# Patient Record
Sex: MALE | Race: WHITE | NOT HISPANIC OR LATINO | URBAN - METROPOLITAN AREA
[De-identification: names, ages, dates, MRNs, and addresses within clinical notes are randomized per-mention and may not be internally consistent; named-entity substitution may affect disease eponyms.]

---

## 2017-01-01 ENCOUNTER — COMMUNICATION - HEALTHEAST (OUTPATIENT)
Dept: INTERNAL MEDICINE | Facility: CLINIC | Age: 77
End: 2017-01-01

## 2017-01-01 ENCOUNTER — OFFICE VISIT - HEALTHEAST (OUTPATIENT)
Dept: GERIATRICS | Facility: CLINIC | Age: 77
End: 2017-01-01

## 2017-01-01 ENCOUNTER — COMMUNICATION - HEALTHEAST (OUTPATIENT)
Dept: GERIATRICS | Facility: CLINIC | Age: 77
End: 2017-01-01

## 2017-01-01 ENCOUNTER — COMMUNICATION - HEALTHEAST (OUTPATIENT)
Dept: FAMILY MEDICINE | Facility: CLINIC | Age: 77
End: 2017-01-01

## 2017-01-01 ENCOUNTER — OFFICE VISIT - HEALTHEAST (OUTPATIENT)
Dept: FAMILY MEDICINE | Facility: CLINIC | Age: 77
End: 2017-01-01

## 2017-01-01 ENCOUNTER — RECORDS - HEALTHEAST (OUTPATIENT)
Dept: ADMINISTRATIVE | Facility: OTHER | Age: 77
End: 2017-01-01

## 2017-01-01 ENCOUNTER — AMBULATORY - HEALTHEAST (OUTPATIENT)
Dept: CARDIOLOGY | Facility: CLINIC | Age: 77
End: 2017-01-01

## 2017-01-01 ENCOUNTER — OFFICE VISIT - HEALTHEAST (OUTPATIENT)
Dept: CARDIOLOGY | Facility: CLINIC | Age: 77
End: 2017-01-01

## 2017-01-01 ENCOUNTER — AMBULATORY - HEALTHEAST (OUTPATIENT)
Dept: GERIATRICS | Facility: CLINIC | Age: 77
End: 2017-01-01

## 2017-01-01 DIAGNOSIS — I50.22 CHRONIC SYSTOLIC HEART FAILURE (H): ICD-10-CM

## 2017-01-01 DIAGNOSIS — N18.4 CKD (CHRONIC KIDNEY DISEASE), STAGE IV (H): ICD-10-CM

## 2017-01-01 DIAGNOSIS — I10 ESSENTIAL HYPERTENSION, BENIGN: ICD-10-CM

## 2017-01-01 DIAGNOSIS — I63.9 ACUTE EMBOLIC STROKE (H): ICD-10-CM

## 2017-01-01 DIAGNOSIS — E11.9 DIABETES TYPE 2, CONTROLLED (H): ICD-10-CM

## 2017-01-01 DIAGNOSIS — E03.9 ACQUIRED HYPOTHYROIDISM: ICD-10-CM

## 2017-01-01 DIAGNOSIS — R41.0 DELIRIUM: ICD-10-CM

## 2017-01-01 DIAGNOSIS — E11.3299 MILD NONPROLIFERATIVE DIABETIC RETINOPATHY (H): ICD-10-CM

## 2017-01-01 DIAGNOSIS — R41.3 MEMORY LOSS: ICD-10-CM

## 2017-01-01 DIAGNOSIS — E11.9 TYPE 2 DIABETES MELLITUS (H): ICD-10-CM

## 2017-01-01 DIAGNOSIS — I48.0 PAROXYSMAL ATRIAL FIBRILLATION (H): ICD-10-CM

## 2017-01-01 DIAGNOSIS — N18.5 CKD (CHRONIC KIDNEY DISEASE) STAGE 5, GFR LESS THAN 15 ML/MIN (H): ICD-10-CM

## 2017-01-01 DIAGNOSIS — E11.9 DIABETES MELLITUS (H): ICD-10-CM

## 2017-01-01 DIAGNOSIS — F33.0 MAJOR DEPRESSIVE DISORDER, RECURRENT EPISODE, MILD (H): ICD-10-CM

## 2017-01-01 DIAGNOSIS — Z79.899 LONG TERM USE OF DRUG: ICD-10-CM

## 2017-01-01 DIAGNOSIS — I35.0 NONRHEUMATIC AORTIC VALVE STENOSIS: ICD-10-CM

## 2017-01-01 DIAGNOSIS — E11.9 TYPE II DIABETES MELLITUS (H): ICD-10-CM

## 2017-01-01 DIAGNOSIS — E78.00 PURE HYPERCHOLESTEROLEMIA: ICD-10-CM

## 2017-01-01 DIAGNOSIS — I10 ESSENTIAL HYPERTENSION: ICD-10-CM

## 2017-01-01 DIAGNOSIS — E78.5 DYSLIPIDEMIA: ICD-10-CM

## 2017-01-01 ASSESSMENT — MIFFLIN-ST. JEOR
SCORE: 1454.1
SCORE: 1465.15
SCORE: 1461.18

## 2017-01-04 ENCOUNTER — COMMUNICATION - HEALTHEAST (OUTPATIENT)
Dept: INTERNAL MEDICINE | Facility: CLINIC | Age: 77
End: 2017-01-04

## 2017-01-04 ENCOUNTER — AMBULATORY - HEALTHEAST (OUTPATIENT)
Dept: LAB | Facility: CLINIC | Age: 77
End: 2017-01-04

## 2017-01-04 DIAGNOSIS — I63.9 ACUTE EMBOLIC STROKE (H): ICD-10-CM

## 2017-01-06 ENCOUNTER — AMBULATORY - HEALTHEAST (OUTPATIENT)
Dept: FAMILY MEDICINE | Facility: CLINIC | Age: 77
End: 2017-01-06

## 2017-01-10 ENCOUNTER — RECORDS - HEALTHEAST (OUTPATIENT)
Dept: ADMINISTRATIVE | Facility: OTHER | Age: 77
End: 2017-01-10

## 2017-01-12 ENCOUNTER — AMBULATORY - HEALTHEAST (OUTPATIENT)
Dept: LAB | Facility: CLINIC | Age: 77
End: 2017-01-12

## 2017-01-12 ENCOUNTER — COMMUNICATION - HEALTHEAST (OUTPATIENT)
Dept: INTERNAL MEDICINE | Facility: CLINIC | Age: 77
End: 2017-01-12

## 2017-01-12 DIAGNOSIS — I63.9 ACUTE EMBOLIC STROKE (H): ICD-10-CM

## 2017-01-16 ENCOUNTER — RECORDS - HEALTHEAST (OUTPATIENT)
Dept: ADMINISTRATIVE | Facility: OTHER | Age: 77
End: 2017-01-16

## 2017-01-19 ENCOUNTER — OFFICE VISIT - HEALTHEAST (OUTPATIENT)
Dept: FAMILY MEDICINE | Facility: CLINIC | Age: 77
End: 2017-01-19

## 2017-01-19 ENCOUNTER — COMMUNICATION - HEALTHEAST (OUTPATIENT)
Dept: INTERNAL MEDICINE | Facility: CLINIC | Age: 77
End: 2017-01-19

## 2017-01-19 DIAGNOSIS — E03.9 HYPOTHYROIDISM (ACQUIRED): ICD-10-CM

## 2017-01-19 DIAGNOSIS — R23.3 ECCHYMOSES, SPONTANEOUS: ICD-10-CM

## 2017-01-19 DIAGNOSIS — I10 ESSENTIAL HYPERTENSION, BENIGN: ICD-10-CM

## 2017-01-19 DIAGNOSIS — E11.9 DIABETES TYPE 2, CONTROLLED (H): ICD-10-CM

## 2017-01-19 DIAGNOSIS — I63.9 ACUTE EMBOLIC STROKE (H): ICD-10-CM

## 2017-01-19 DIAGNOSIS — E78.00 PURE HYPERCHOLESTEROLEMIA: ICD-10-CM

## 2017-01-19 LAB
CHOLEST SERPL-MCNC: 180 MG/DL
FASTING STATUS PATIENT QL REPORTED: NO
HBA1C MFR BLD: 6.5 % (ref 3.5–6)
HDLC SERPL-MCNC: 33 MG/DL
LDLC SERPL CALC-MCNC: 89 MG/DL
TRIGL SERPL-MCNC: 292 MG/DL

## 2017-01-19 ASSESSMENT — MIFFLIN-ST. JEOR: SCORE: 1435.67

## 2017-01-24 ENCOUNTER — AMBULATORY - HEALTHEAST (OUTPATIENT)
Dept: CARDIOLOGY | Facility: CLINIC | Age: 77
End: 2017-01-24

## 2017-01-24 ENCOUNTER — RECORDS - HEALTHEAST (OUTPATIENT)
Dept: ADMINISTRATIVE | Facility: OTHER | Age: 77
End: 2017-01-24

## 2017-01-25 ENCOUNTER — COMMUNICATION - HEALTHEAST (OUTPATIENT)
Dept: CARDIOLOGY | Facility: CLINIC | Age: 77
End: 2017-01-25

## 2017-01-25 DIAGNOSIS — I48.91 ATRIAL FIBRILLATION (H): ICD-10-CM

## 2017-01-26 ENCOUNTER — COMMUNICATION - HEALTHEAST (OUTPATIENT)
Dept: INTERNAL MEDICINE | Facility: CLINIC | Age: 77
End: 2017-01-26

## 2017-01-26 ENCOUNTER — AMBULATORY - HEALTHEAST (OUTPATIENT)
Dept: LAB | Facility: CLINIC | Age: 77
End: 2017-01-26

## 2017-01-26 DIAGNOSIS — I63.9 ACUTE EMBOLIC STROKE (H): ICD-10-CM

## 2017-01-27 ENCOUNTER — COMMUNICATION - HEALTHEAST (OUTPATIENT)
Dept: INTERNAL MEDICINE | Facility: CLINIC | Age: 77
End: 2017-01-27

## 2017-01-27 DIAGNOSIS — I63.9 ACUTE EMBOLIC STROKE (H): ICD-10-CM

## 2017-01-30 ENCOUNTER — COMMUNICATION - HEALTHEAST (OUTPATIENT)
Dept: FAMILY MEDICINE | Facility: CLINIC | Age: 77
End: 2017-01-30

## 2017-01-30 DIAGNOSIS — E78.01 ESSENTIAL FAMILIAL HYPERCHOLESTEROLEMIA: ICD-10-CM

## 2017-02-02 ENCOUNTER — AMBULATORY - HEALTHEAST (OUTPATIENT)
Dept: FAMILY MEDICINE | Facility: CLINIC | Age: 77
End: 2017-02-02

## 2017-02-02 ENCOUNTER — COMMUNICATION - HEALTHEAST (OUTPATIENT)
Dept: FAMILY MEDICINE | Facility: CLINIC | Age: 77
End: 2017-02-02

## 2017-02-02 DIAGNOSIS — I50.22 CHRONIC SYSTOLIC HEART FAILURE (H): ICD-10-CM

## 2017-02-02 DIAGNOSIS — I42.9 CARDIOMYOPATHY (H): ICD-10-CM

## 2017-02-06 ENCOUNTER — COMMUNICATION - HEALTHEAST (OUTPATIENT)
Dept: INTERNAL MEDICINE | Facility: CLINIC | Age: 77
End: 2017-02-06

## 2017-02-06 ENCOUNTER — AMBULATORY - HEALTHEAST (OUTPATIENT)
Dept: LAB | Facility: CLINIC | Age: 77
End: 2017-02-06

## 2017-02-06 DIAGNOSIS — I63.9 ACUTE EMBOLIC STROKE (H): ICD-10-CM

## 2017-02-07 ENCOUNTER — AMBULATORY - HEALTHEAST (OUTPATIENT)
Dept: CARDIOLOGY | Facility: CLINIC | Age: 77
End: 2017-02-07

## 2017-02-16 ENCOUNTER — AMBULATORY - HEALTHEAST (OUTPATIENT)
Dept: LAB | Facility: CLINIC | Age: 77
End: 2017-02-16

## 2017-02-16 ENCOUNTER — COMMUNICATION - HEALTHEAST (OUTPATIENT)
Dept: INTERNAL MEDICINE | Facility: CLINIC | Age: 77
End: 2017-02-16

## 2017-02-16 DIAGNOSIS — I63.9 ACUTE EMBOLIC STROKE (H): ICD-10-CM

## 2017-02-21 ENCOUNTER — COMMUNICATION - HEALTHEAST (OUTPATIENT)
Dept: INTERNAL MEDICINE | Facility: CLINIC | Age: 77
End: 2017-02-21

## 2017-02-21 ENCOUNTER — AMBULATORY - HEALTHEAST (OUTPATIENT)
Dept: LAB | Facility: CLINIC | Age: 77
End: 2017-02-21

## 2017-02-21 DIAGNOSIS — I63.9 ACUTE EMBOLIC STROKE (H): ICD-10-CM

## 2017-02-28 ENCOUNTER — AMBULATORY - HEALTHEAST (OUTPATIENT)
Dept: FAMILY MEDICINE | Facility: CLINIC | Age: 77
End: 2017-02-28

## 2017-02-28 DIAGNOSIS — R19.7 DIARRHEA, UNSPECIFIED TYPE: ICD-10-CM

## 2017-03-01 ENCOUNTER — RECORDS - HEALTHEAST (OUTPATIENT)
Dept: ADMINISTRATIVE | Facility: OTHER | Age: 77
End: 2017-03-01

## 2017-03-03 ENCOUNTER — AMBULATORY - HEALTHEAST (OUTPATIENT)
Dept: LAB | Facility: CLINIC | Age: 77
End: 2017-03-03

## 2017-03-03 ENCOUNTER — OFFICE VISIT - HEALTHEAST (OUTPATIENT)
Dept: FAMILY MEDICINE | Facility: CLINIC | Age: 77
End: 2017-03-03

## 2017-03-03 ENCOUNTER — COMMUNICATION - HEALTHEAST (OUTPATIENT)
Dept: LAB | Facility: CLINIC | Age: 77
End: 2017-03-03

## 2017-03-03 DIAGNOSIS — R19.7 DIARRHEA: ICD-10-CM

## 2017-03-03 DIAGNOSIS — I63.9 ACUTE EMBOLIC STROKE (H): ICD-10-CM

## 2017-03-03 DIAGNOSIS — K62.5 BRIGHT RED BLOOD PER RECTUM: ICD-10-CM

## 2017-03-03 DIAGNOSIS — R19.7 DIARRHEA, UNSPECIFIED TYPE: ICD-10-CM

## 2017-03-03 DIAGNOSIS — R79.1 SUPRATHERAPEUTIC INR: ICD-10-CM

## 2017-03-03 ASSESSMENT — MIFFLIN-ST. JEOR: SCORE: 1408.46

## 2017-03-06 ENCOUNTER — AMBULATORY - HEALTHEAST (OUTPATIENT)
Dept: CARE COORDINATION | Facility: CLINIC | Age: 77
End: 2017-03-06

## 2017-03-07 ENCOUNTER — COMMUNICATION - HEALTHEAST (OUTPATIENT)
Dept: INTERNAL MEDICINE | Facility: CLINIC | Age: 77
End: 2017-03-07

## 2017-03-07 ENCOUNTER — AMBULATORY - HEALTHEAST (OUTPATIENT)
Dept: LAB | Facility: CLINIC | Age: 77
End: 2017-03-07

## 2017-03-07 DIAGNOSIS — I63.9 ACUTE EMBOLIC STROKE (H): ICD-10-CM

## 2017-03-16 ENCOUNTER — AMBULATORY - HEALTHEAST (OUTPATIENT)
Dept: CARDIOLOGY | Facility: CLINIC | Age: 77
End: 2017-03-16

## 2017-03-21 ENCOUNTER — AMBULATORY - HEALTHEAST (OUTPATIENT)
Dept: LAB | Facility: CLINIC | Age: 77
End: 2017-03-21

## 2017-03-21 ENCOUNTER — COMMUNICATION - HEALTHEAST (OUTPATIENT)
Dept: INTERNAL MEDICINE | Facility: CLINIC | Age: 77
End: 2017-03-21

## 2017-03-21 DIAGNOSIS — I63.9 ACUTE EMBOLIC STROKE (H): ICD-10-CM

## 2017-03-29 ENCOUNTER — RECORDS - HEALTHEAST (OUTPATIENT)
Dept: ADMINISTRATIVE | Facility: OTHER | Age: 77
End: 2017-03-29

## 2017-03-29 ENCOUNTER — AMBULATORY - HEALTHEAST (OUTPATIENT)
Dept: CARDIOLOGY | Facility: CLINIC | Age: 77
End: 2017-03-29

## 2017-03-31 ENCOUNTER — AMBULATORY - HEALTHEAST (OUTPATIENT)
Dept: CARDIOLOGY | Facility: CLINIC | Age: 77
End: 2017-03-31

## 2017-04-02 ENCOUNTER — OFFICE VISIT - HEALTHEAST (OUTPATIENT)
Dept: FAMILY MEDICINE | Facility: CLINIC | Age: 77
End: 2017-04-02

## 2017-04-02 DIAGNOSIS — N18.5 CKD (CHRONIC KIDNEY DISEASE) STAGE 5, GFR LESS THAN 15 ML/MIN (H): ICD-10-CM

## 2017-04-02 DIAGNOSIS — R60.9 EDEMA, UNSPECIFIED TYPE: ICD-10-CM

## 2017-04-02 DIAGNOSIS — I50.22 CHRONIC LEFT SYSTOLIC HEART FAILURE (H): ICD-10-CM

## 2017-04-02 DIAGNOSIS — I10 ESSENTIAL HYPERTENSION, BENIGN: ICD-10-CM

## 2017-04-02 DIAGNOSIS — E87.6 HYPOKALEMIA: ICD-10-CM

## 2017-04-02 LAB
ATRIAL RATE - MUSE: 68 BPM
DIASTOLIC BLOOD PRESSURE - MUSE: NORMAL MMHG
INTERPRETATION ECG - MUSE: NORMAL
P AXIS - MUSE: 78 DEGREES
PR INTERVAL - MUSE: 232 MS
QRS DURATION - MUSE: 134 MS
QT - MUSE: 468 MS
QTC - MUSE: 497 MS
R AXIS - MUSE: -60 DEGREES
SYSTOLIC BLOOD PRESSURE - MUSE: NORMAL MMHG
T AXIS - MUSE: 13 DEGREES
VENTRICULAR RATE- MUSE: 68 BPM

## 2017-04-04 ENCOUNTER — OFFICE VISIT - HEALTHEAST (OUTPATIENT)
Dept: FAMILY MEDICINE | Facility: CLINIC | Age: 77
End: 2017-04-04

## 2017-04-04 ENCOUNTER — COMMUNICATION - HEALTHEAST (OUTPATIENT)
Dept: INTERNAL MEDICINE | Facility: CLINIC | Age: 77
End: 2017-04-04

## 2017-04-04 DIAGNOSIS — R60.9 EDEMA, UNSPECIFIED TYPE: ICD-10-CM

## 2017-04-04 DIAGNOSIS — N18.5 CKD (CHRONIC KIDNEY DISEASE) STAGE 5, GFR LESS THAN 15 ML/MIN (H): ICD-10-CM

## 2017-04-04 DIAGNOSIS — I63.9 ACUTE EMBOLIC STROKE (H): ICD-10-CM

## 2017-04-04 DIAGNOSIS — I10 ESSENTIAL HYPERTENSION, BENIGN: ICD-10-CM

## 2017-04-04 ASSESSMENT — MIFFLIN-ST. JEOR: SCORE: 1432.84

## 2017-04-12 ENCOUNTER — AMBULATORY - HEALTHEAST (OUTPATIENT)
Dept: FAMILY MEDICINE | Facility: CLINIC | Age: 77
End: 2017-04-12

## 2017-04-12 DIAGNOSIS — N18.5 CKD (CHRONIC KIDNEY DISEASE) STAGE 5, GFR LESS THAN 15 ML/MIN (H): ICD-10-CM

## 2017-04-12 DIAGNOSIS — I10 ESSENTIAL HYPERTENSION, BENIGN: ICD-10-CM

## 2017-04-18 ENCOUNTER — COMMUNICATION - HEALTHEAST (OUTPATIENT)
Dept: INTERNAL MEDICINE | Facility: CLINIC | Age: 77
End: 2017-04-18

## 2017-04-18 ENCOUNTER — AMBULATORY - HEALTHEAST (OUTPATIENT)
Dept: LAB | Facility: CLINIC | Age: 77
End: 2017-04-18

## 2017-04-18 DIAGNOSIS — I63.9 ACUTE EMBOLIC STROKE (H): ICD-10-CM

## 2017-04-18 DIAGNOSIS — I10 ESSENTIAL HYPERTENSION, BENIGN: ICD-10-CM

## 2017-04-18 DIAGNOSIS — N18.5 CKD (CHRONIC KIDNEY DISEASE) STAGE 5, GFR LESS THAN 15 ML/MIN (H): ICD-10-CM

## 2017-04-21 ENCOUNTER — COMMUNICATION - HEALTHEAST (OUTPATIENT)
Dept: FAMILY MEDICINE | Facility: CLINIC | Age: 77
End: 2017-04-21

## 2017-04-24 ENCOUNTER — COMMUNICATION - HEALTHEAST (OUTPATIENT)
Dept: INTERNAL MEDICINE | Facility: CLINIC | Age: 77
End: 2017-04-24

## 2017-04-24 ENCOUNTER — AMBULATORY - HEALTHEAST (OUTPATIENT)
Dept: LAB | Facility: CLINIC | Age: 77
End: 2017-04-24

## 2017-04-24 DIAGNOSIS — I63.9 ACUTE EMBOLIC STROKE (H): ICD-10-CM

## 2017-04-28 ENCOUNTER — AMBULATORY - HEALTHEAST (OUTPATIENT)
Dept: LAB | Facility: CLINIC | Age: 77
End: 2017-04-28

## 2017-04-28 ENCOUNTER — COMMUNICATION - HEALTHEAST (OUTPATIENT)
Dept: INTERNAL MEDICINE | Facility: CLINIC | Age: 77
End: 2017-04-28

## 2017-04-28 DIAGNOSIS — I63.9 ACUTE EMBOLIC STROKE (H): ICD-10-CM

## 2017-05-01 ENCOUNTER — COMMUNICATION - HEALTHEAST (OUTPATIENT)
Dept: INTERNAL MEDICINE | Facility: CLINIC | Age: 77
End: 2017-05-01

## 2017-05-05 ENCOUNTER — OFFICE VISIT - HEALTHEAST (OUTPATIENT)
Dept: CARDIOLOGY | Facility: CLINIC | Age: 77
End: 2017-05-05

## 2017-05-05 ENCOUNTER — COMMUNICATION - HEALTHEAST (OUTPATIENT)
Dept: INTERNAL MEDICINE | Facility: CLINIC | Age: 77
End: 2017-05-05

## 2017-05-05 ENCOUNTER — AMBULATORY - HEALTHEAST (OUTPATIENT)
Dept: LAB | Facility: CLINIC | Age: 77
End: 2017-05-05

## 2017-05-05 DIAGNOSIS — I63.9 ACUTE EMBOLIC STROKE (H): ICD-10-CM

## 2017-05-05 DIAGNOSIS — I42.8 NONISCHEMIC CARDIOMYOPATHY (H): ICD-10-CM

## 2017-05-05 ASSESSMENT — MIFFLIN-ST. JEOR: SCORE: 1453.82

## 2017-05-09 ENCOUNTER — COMMUNICATION - HEALTHEAST (OUTPATIENT)
Dept: INTERNAL MEDICINE | Facility: CLINIC | Age: 77
End: 2017-05-09

## 2017-05-09 ENCOUNTER — AMBULATORY - HEALTHEAST (OUTPATIENT)
Dept: LAB | Facility: CLINIC | Age: 77
End: 2017-05-09

## 2017-05-09 DIAGNOSIS — I63.9 ACUTE EMBOLIC STROKE (H): ICD-10-CM

## 2017-05-10 ENCOUNTER — RECORDS - HEALTHEAST (OUTPATIENT)
Dept: ADMINISTRATIVE | Facility: OTHER | Age: 77
End: 2017-05-10

## 2017-05-10 ENCOUNTER — AMBULATORY - HEALTHEAST (OUTPATIENT)
Dept: CARDIOLOGY | Facility: CLINIC | Age: 77
End: 2017-05-10

## 2017-05-11 ENCOUNTER — COMMUNICATION - HEALTHEAST (OUTPATIENT)
Dept: INTERNAL MEDICINE | Facility: CLINIC | Age: 77
End: 2017-05-11

## 2017-05-15 ENCOUNTER — COMMUNICATION - HEALTHEAST (OUTPATIENT)
Dept: INTERNAL MEDICINE | Facility: CLINIC | Age: 77
End: 2017-05-15

## 2017-05-15 ENCOUNTER — AMBULATORY - HEALTHEAST (OUTPATIENT)
Dept: LAB | Facility: CLINIC | Age: 77
End: 2017-05-15

## 2017-05-15 DIAGNOSIS — I63.9 ACUTE EMBOLIC STROKE (H): ICD-10-CM

## 2017-05-16 ENCOUNTER — AMBULATORY - HEALTHEAST (OUTPATIENT)
Dept: CARDIOLOGY | Facility: CLINIC | Age: 77
End: 2017-05-16

## 2017-05-22 ENCOUNTER — HOSPITAL ENCOUNTER (OUTPATIENT)
Dept: CARDIOLOGY | Facility: CLINIC | Age: 77
Discharge: HOME | End: 2017-05-22
Attending: INTERNAL MEDICINE

## 2017-05-22 ENCOUNTER — AMBULATORY - HEALTHEAST (OUTPATIENT)
Dept: NURSING | Facility: CLINIC | Age: 77
End: 2017-05-22

## 2017-05-22 DIAGNOSIS — I42.8 NONISCHEMIC CARDIOMYOPATHY (H): ICD-10-CM

## 2017-05-22 DIAGNOSIS — I42.9 CARDIOMYOPATHY, UNSPECIFIED (H): ICD-10-CM

## 2017-05-22 LAB
AORTIC ROOT: 3.3 CM
AORTIC VALVE MEAN VELOCITY: 154 CM/S
AV DIMENSIONLESS INDEX VTI: 0.3
AV MEAN GRADIENT: 11 MMHG
AV PEAK GRADIENT: 23.6 MMHG
AV VALVE AREA: 0.9 CM2
AV VELOCITY RATIO: 0.3
BSA FOR ECHO PROCEDURE: 1.93 M2
CV BLOOD PRESSURE: NORMAL MMHG
CV ECHO HEIGHT: 65 IN
CV ECHO WEIGHT: 179 LBS
DOP CALC AO PEAK VEL: 243 CM/S
DOP CALC AO VTI: 57.2 CM
DOP CALC LVOT AREA: 3.14 CM2
DOP CALC LVOT DIAMETER: 2 CM
DOP CALC LVOT PEAK VEL: 66.2 CM/S
DOP CALC LVOT STROKE VOLUME: 49.3 CM3
DOP CALCLVOT PEAK VEL VTI: 15.7 CM
EJECTION FRACTION: 32 % (ref 55–75)
FRACTIONAL SHORTENING: 14.5 % (ref 28–44)
INTERVENTRICULAR SEPTUM IN END DIASTOLE: 1.1 CM (ref 0.6–1)
IVS/PW RATIO: 1
LA AREA 1: 22.2 CM2
LA AREA 2: 23.9 CM2
LEFT ATRIUM LENGTH: 4.8 CM
LEFT ATRIUM SIZE: 3.8 CM
LEFT ATRIUM VOLUME INDEX: 48.7 ML/M2
LEFT ATRIUM VOLUME: 94 CM3
LEFT VENTRICLE CARDIAC INDEX: 1.7 L/MIN/M2
LEFT VENTRICLE CARDIAC OUTPUT: 3.4 L/MIN
LEFT VENTRICLE DIASTOLIC VOLUME INDEX: 49.2 CM3/M2 (ref 34–74)
LEFT VENTRICLE DIASTOLIC VOLUME: 95 CM3 (ref 62–150)
LEFT VENTRICLE HEART RATE: 68 BPM
LEFT VENTRICLE MASS INDEX: 125.4 G/M2
LEFT VENTRICLE SYSTOLIC VOLUME INDEX: 33.7 CM3/M2 (ref 11–31)
LEFT VENTRICLE SYSTOLIC VOLUME: 65 CM3 (ref 21–61)
LEFT VENTRICULAR INTERNAL DIMENSION IN DIASTOLE: 5.5 CM (ref 4.2–5.8)
LEFT VENTRICULAR INTERNAL DIMENSION IN SYSTOLE: 4.7 CM (ref 2.5–4)
LEFT VENTRICULAR MASS: 242 G
LEFT VENTRICULAR OUTFLOW TRACT MEAN GRADIENT: 1 MMHG
LEFT VENTRICULAR OUTFLOW TRACT MEAN VELOCITY: 40.5 CM/S
LEFT VENTRICULAR OUTFLOW TRACT PEAK GRADIENT: 2 MMHG
LEFT VENTRICULAR POSTERIOR WALL IN END DIASTOLE: 1.1 CM (ref 0.6–1)
LV STROKE VOLUME INDEX: 25.5 ML/M2
MITRAL VALVE E/A RATIO: 1.1
MV AVERAGE E/E' RATIO: 15.2 CM/S
MV DECELERATION TIME: 158 MS
MV E'TISSUE VEL-LAT: 10.2 CM/S
MV E'TISSUE VEL-MED: 4.7 CM/S
MV LATERAL E/E' RATIO: 11.1
MV MEDIAL E/E' RATIO: 24
MV PEAK A VELOCITY: 103 CM/S
MV PEAK E VELOCITY: 113 CM/S
NUC REST DIASTOLIC VOLUME INDEX: 2864 LBS
NUC REST SYSTOLIC VOLUME INDEX: 65 IN
PR MAX PG: 6 MMHG
PR PEAK VELOCITY: 119 CM/S
TRICUSPID REGURGITATION PEAK PRESSURE GRADIENT: 16.2 MMHG
TRICUSPID VALVE PEAK REGURGITANT VELOCITY: 201 CM/S

## 2017-05-22 ASSESSMENT — MIFFLIN-ST. JEOR: SCORE: 1453.82

## 2017-05-24 ENCOUNTER — AMBULATORY - HEALTHEAST (OUTPATIENT)
Dept: LAB | Facility: CLINIC | Age: 77
End: 2017-05-24

## 2017-05-24 ENCOUNTER — COMMUNICATION - HEALTHEAST (OUTPATIENT)
Dept: CARDIOLOGY | Facility: CLINIC | Age: 77
End: 2017-05-24

## 2017-05-24 ENCOUNTER — COMMUNICATION - HEALTHEAST (OUTPATIENT)
Dept: INTERNAL MEDICINE | Facility: CLINIC | Age: 77
End: 2017-05-24

## 2017-05-24 DIAGNOSIS — I10 ESSENTIAL HYPERTENSION, BENIGN: ICD-10-CM

## 2017-05-24 DIAGNOSIS — I63.9 ACUTE EMBOLIC STROKE (H): ICD-10-CM

## 2017-05-31 ENCOUNTER — COMMUNICATION - HEALTHEAST (OUTPATIENT)
Dept: FAMILY MEDICINE | Facility: CLINIC | Age: 77
End: 2017-05-31

## 2017-05-31 DIAGNOSIS — Z79.01 LONG TERM CURRENT USE OF ANTICOAGULANT THERAPY: ICD-10-CM

## 2017-05-31 DIAGNOSIS — I63.9 ACUTE EMBOLIC STROKE (H): ICD-10-CM

## 2017-05-31 DIAGNOSIS — I48.0 PAROXYSMAL ATRIAL FIBRILLATION (H): ICD-10-CM

## 2017-06-03 ENCOUNTER — COMMUNICATION - HEALTHEAST (OUTPATIENT)
Dept: FAMILY MEDICINE | Facility: CLINIC | Age: 77
End: 2017-06-03

## 2017-06-03 DIAGNOSIS — E87.6 HYPOKALEMIA: ICD-10-CM

## 2017-06-05 ENCOUNTER — AMBULATORY - HEALTHEAST (OUTPATIENT)
Dept: LAB | Facility: CLINIC | Age: 77
End: 2017-06-05

## 2017-06-05 ENCOUNTER — COMMUNICATION - HEALTHEAST (OUTPATIENT)
Dept: INTERNAL MEDICINE | Facility: CLINIC | Age: 77
End: 2017-06-05

## 2017-06-05 DIAGNOSIS — I63.9 ACUTE EMBOLIC STROKE (H): ICD-10-CM

## 2017-06-19 ENCOUNTER — COMMUNICATION - HEALTHEAST (OUTPATIENT)
Dept: INTERNAL MEDICINE | Facility: CLINIC | Age: 77
End: 2017-06-19

## 2017-06-19 ENCOUNTER — AMBULATORY - HEALTHEAST (OUTPATIENT)
Dept: LAB | Facility: CLINIC | Age: 77
End: 2017-06-19

## 2017-06-19 DIAGNOSIS — I63.9 ACUTE EMBOLIC STROKE (H): ICD-10-CM

## 2017-06-22 ENCOUNTER — RECORDS - HEALTHEAST (OUTPATIENT)
Dept: ADMINISTRATIVE | Facility: OTHER | Age: 77
End: 2017-06-22

## 2017-06-22 ENCOUNTER — AMBULATORY - HEALTHEAST (OUTPATIENT)
Dept: CARDIOLOGY | Facility: CLINIC | Age: 77
End: 2017-06-22

## 2017-06-26 ENCOUNTER — AMBULATORY - HEALTHEAST (OUTPATIENT)
Dept: CARDIOLOGY | Facility: CLINIC | Age: 77
End: 2017-06-26

## 2017-06-29 ENCOUNTER — COMMUNICATION - HEALTHEAST (OUTPATIENT)
Dept: INTERNAL MEDICINE | Facility: CLINIC | Age: 77
End: 2017-06-29

## 2017-06-29 ENCOUNTER — AMBULATORY - HEALTHEAST (OUTPATIENT)
Dept: LAB | Facility: CLINIC | Age: 77
End: 2017-06-29

## 2017-06-29 DIAGNOSIS — I63.9 ACUTE EMBOLIC STROKE (H): ICD-10-CM

## 2017-07-05 ENCOUNTER — COMMUNICATION - HEALTHEAST (OUTPATIENT)
Dept: FAMILY MEDICINE | Facility: CLINIC | Age: 77
End: 2017-07-05

## 2017-07-05 DIAGNOSIS — I10 ESSENTIAL HYPERTENSION, BENIGN: ICD-10-CM

## 2017-07-10 ENCOUNTER — COMMUNICATION - HEALTHEAST (OUTPATIENT)
Dept: INTERNAL MEDICINE | Facility: CLINIC | Age: 77
End: 2017-07-10

## 2017-07-10 ENCOUNTER — AMBULATORY - HEALTHEAST (OUTPATIENT)
Dept: LAB | Facility: CLINIC | Age: 77
End: 2017-07-10

## 2017-07-10 DIAGNOSIS — I63.9 ACUTE EMBOLIC STROKE (H): ICD-10-CM

## 2017-07-25 ENCOUNTER — AMBULATORY - HEALTHEAST (OUTPATIENT)
Dept: FAMILY MEDICINE | Facility: CLINIC | Age: 77
End: 2017-07-25

## 2017-07-25 DIAGNOSIS — E11.9 DIABETES TYPE 2, CONTROLLED (H): ICD-10-CM

## 2017-07-25 DIAGNOSIS — N18.5 CKD (CHRONIC KIDNEY DISEASE) STAGE 5, GFR LESS THAN 15 ML/MIN (H): ICD-10-CM

## 2017-07-25 DIAGNOSIS — I10 ESSENTIAL HYPERTENSION, BENIGN: ICD-10-CM

## 2017-07-26 ENCOUNTER — COMMUNICATION - HEALTHEAST (OUTPATIENT)
Dept: INTERNAL MEDICINE | Facility: CLINIC | Age: 77
End: 2017-07-26

## 2017-07-26 ENCOUNTER — AMBULATORY - HEALTHEAST (OUTPATIENT)
Dept: LAB | Facility: CLINIC | Age: 77
End: 2017-07-26

## 2017-07-26 DIAGNOSIS — I63.9 ACUTE EMBOLIC STROKE (H): ICD-10-CM

## 2017-07-26 DIAGNOSIS — E78.00 PURE HYPERCHOLESTEROLEMIA: ICD-10-CM

## 2017-07-26 DIAGNOSIS — N18.5 CKD (CHRONIC KIDNEY DISEASE) STAGE 5, GFR LESS THAN 15 ML/MIN (H): ICD-10-CM

## 2017-07-26 DIAGNOSIS — I10 ESSENTIAL HYPERTENSION, BENIGN: ICD-10-CM

## 2017-07-26 DIAGNOSIS — E11.9 DIABETES TYPE 2, CONTROLLED (H): ICD-10-CM

## 2017-07-26 LAB
CHOLEST SERPL-MCNC: 177 MG/DL
FASTING STATUS PATIENT QL REPORTED: YES
HBA1C MFR BLD: 6.5 % (ref 3.5–6)
HDLC SERPL-MCNC: 29 MG/DL
LDLC SERPL CALC-MCNC: 80 MG/DL
LDLC SERPL CALC-MCNC: ABNORMAL MG/DL
TRIGL SERPL-MCNC: 411 MG/DL

## 2017-07-30 ENCOUNTER — COMMUNICATION - HEALTHEAST (OUTPATIENT)
Dept: FAMILY MEDICINE | Facility: CLINIC | Age: 77
End: 2017-07-30

## 2017-08-09 ENCOUNTER — COMMUNICATION - HEALTHEAST (OUTPATIENT)
Dept: INTERNAL MEDICINE | Facility: CLINIC | Age: 77
End: 2017-08-09

## 2017-08-09 ENCOUNTER — AMBULATORY - HEALTHEAST (OUTPATIENT)
Dept: LAB | Facility: CLINIC | Age: 77
End: 2017-08-09

## 2017-08-09 DIAGNOSIS — I63.9 ACUTE EMBOLIC STROKE (H): ICD-10-CM

## 2017-08-14 ENCOUNTER — RECORDS - HEALTHEAST (OUTPATIENT)
Dept: ADMINISTRATIVE | Facility: OTHER | Age: 77
End: 2017-08-14

## 2017-08-23 ENCOUNTER — AMBULATORY - HEALTHEAST (OUTPATIENT)
Dept: LAB | Facility: CLINIC | Age: 77
End: 2017-08-23

## 2017-08-23 ENCOUNTER — COMMUNICATION - HEALTHEAST (OUTPATIENT)
Dept: INTERNAL MEDICINE | Facility: CLINIC | Age: 77
End: 2017-08-23

## 2017-08-23 DIAGNOSIS — I63.9 ACUTE EMBOLIC STROKE (H): ICD-10-CM

## 2017-08-23 DIAGNOSIS — Z79.899 LONG TERM USE OF DRUG: ICD-10-CM

## 2017-08-23 LAB — ALT SERPL W P-5'-P-CCNC: 26 U/L (ref 0–45)

## 2017-09-06 ENCOUNTER — AMBULATORY - HEALTHEAST (OUTPATIENT)
Dept: CARDIOLOGY | Facility: CLINIC | Age: 77
End: 2017-09-06

## 2017-09-08 ENCOUNTER — AMBULATORY - HEALTHEAST (OUTPATIENT)
Dept: LAB | Facility: CLINIC | Age: 77
End: 2017-09-08

## 2017-09-08 ENCOUNTER — COMMUNICATION - HEALTHEAST (OUTPATIENT)
Dept: INTERNAL MEDICINE | Facility: CLINIC | Age: 77
End: 2017-09-08

## 2017-09-08 DIAGNOSIS — I63.9 ACUTE EMBOLIC STROKE (H): ICD-10-CM

## 2017-09-08 DIAGNOSIS — I48.0 PAROXYSMAL ATRIAL FIBRILLATION (H): ICD-10-CM

## 2017-09-20 ENCOUNTER — COMMUNICATION - HEALTHEAST (OUTPATIENT)
Dept: INTERNAL MEDICINE | Facility: CLINIC | Age: 77
End: 2017-09-20

## 2017-09-20 ENCOUNTER — AMBULATORY - HEALTHEAST (OUTPATIENT)
Dept: LAB | Facility: CLINIC | Age: 77
End: 2017-09-20

## 2017-09-20 DIAGNOSIS — I48.0 PAROXYSMAL ATRIAL FIBRILLATION (H): ICD-10-CM

## 2017-09-20 DIAGNOSIS — I63.9 ACUTE EMBOLIC STROKE (H): ICD-10-CM

## 2017-09-22 ENCOUNTER — COMMUNICATION - HEALTHEAST (OUTPATIENT)
Dept: FAMILY MEDICINE | Facility: CLINIC | Age: 77
End: 2017-09-22

## 2017-09-22 DIAGNOSIS — Z79.01 LONG TERM CURRENT USE OF ANTICOAGULANT THERAPY: ICD-10-CM

## 2017-09-22 DIAGNOSIS — I63.9 ACUTE EMBOLIC STROKE (H): ICD-10-CM

## 2017-09-22 DIAGNOSIS — I48.0 PAF (PAROXYSMAL ATRIAL FIBRILLATION) (H): ICD-10-CM

## 2017-09-22 DIAGNOSIS — I48.0 PAROXYSMAL ATRIAL FIBRILLATION (H): ICD-10-CM

## 2017-09-27 ENCOUNTER — COMMUNICATION - HEALTHEAST (OUTPATIENT)
Dept: INTERNAL MEDICINE | Facility: CLINIC | Age: 77
End: 2017-09-27

## 2017-09-27 ENCOUNTER — AMBULATORY - HEALTHEAST (OUTPATIENT)
Dept: LAB | Facility: CLINIC | Age: 77
End: 2017-09-27

## 2017-09-27 DIAGNOSIS — I48.0 PAROXYSMAL ATRIAL FIBRILLATION (H): ICD-10-CM

## 2017-09-27 DIAGNOSIS — I63.9 ACUTE EMBOLIC STROKE (H): ICD-10-CM

## 2017-10-04 ENCOUNTER — COMMUNICATION - HEALTHEAST (OUTPATIENT)
Dept: INTERNAL MEDICINE | Facility: CLINIC | Age: 77
End: 2017-10-04

## 2017-10-04 ENCOUNTER — AMBULATORY - HEALTHEAST (OUTPATIENT)
Dept: LAB | Facility: CLINIC | Age: 77
End: 2017-10-04

## 2017-10-04 DIAGNOSIS — I63.9 ACUTE EMBOLIC STROKE (H): ICD-10-CM

## 2017-10-04 DIAGNOSIS — I48.0 PAROXYSMAL ATRIAL FIBRILLATION (H): ICD-10-CM

## 2017-10-16 ENCOUNTER — COMMUNICATION - HEALTHEAST (OUTPATIENT)
Dept: INTERNAL MEDICINE | Facility: CLINIC | Age: 77
End: 2017-10-16

## 2017-10-18 ENCOUNTER — AMBULATORY - HEALTHEAST (OUTPATIENT)
Dept: LAB | Facility: CLINIC | Age: 77
End: 2017-10-18

## 2017-10-18 ENCOUNTER — COMMUNICATION - HEALTHEAST (OUTPATIENT)
Dept: INTERNAL MEDICINE | Facility: CLINIC | Age: 77
End: 2017-10-18

## 2017-10-18 DIAGNOSIS — I48.0 PAROXYSMAL ATRIAL FIBRILLATION (H): ICD-10-CM

## 2017-10-18 DIAGNOSIS — I63.9 ACUTE EMBOLIC STROKE (H): ICD-10-CM

## 2017-10-23 ENCOUNTER — COMMUNICATION - HEALTHEAST (OUTPATIENT)
Dept: INTERNAL MEDICINE | Facility: CLINIC | Age: 77
End: 2017-10-23

## 2017-10-23 DIAGNOSIS — I63.9 ACUTE EMBOLIC STROKE (H): ICD-10-CM

## 2017-10-24 ENCOUNTER — HOME CARE/HOSPICE - HEALTHEAST (OUTPATIENT)
Dept: HOME HEALTH SERVICES | Facility: HOME HEALTH | Age: 77
End: 2017-10-24

## 2018-01-01 ENCOUNTER — RECORDS - HEALTHEAST (OUTPATIENT)
Dept: LAB | Facility: CLINIC | Age: 78
End: 2018-01-01

## 2018-01-01 ENCOUNTER — RECORDS - HEALTHEAST (OUTPATIENT)
Dept: ADMINISTRATIVE | Facility: OTHER | Age: 78
End: 2018-01-01

## 2018-01-01 ENCOUNTER — AMBULATORY - HEALTHEAST (OUTPATIENT)
Dept: GERIATRICS | Facility: CLINIC | Age: 78
End: 2018-01-01

## 2018-01-01 ENCOUNTER — COMMUNICATION - HEALTHEAST (OUTPATIENT)
Dept: FAMILY MEDICINE | Facility: CLINIC | Age: 78
End: 2018-01-01

## 2018-01-01 ENCOUNTER — OFFICE VISIT - HEALTHEAST (OUTPATIENT)
Dept: GERIATRICS | Facility: CLINIC | Age: 78
End: 2018-01-01

## 2018-01-01 ENCOUNTER — OFFICE VISIT - HEALTHEAST (OUTPATIENT)
Dept: FAMILY MEDICINE | Facility: CLINIC | Age: 78
End: 2018-01-01

## 2018-01-01 ENCOUNTER — ANESTHESIA - HEALTHEAST (OUTPATIENT)
Dept: SURGERY | Facility: CLINIC | Age: 78
End: 2018-01-01

## 2018-01-01 ENCOUNTER — AMBULATORY - HEALTHEAST (OUTPATIENT)
Dept: CARDIOLOGY | Facility: CLINIC | Age: 78
End: 2018-01-01

## 2018-01-01 ENCOUNTER — COMMUNICATION - HEALTHEAST (OUTPATIENT)
Dept: INTERNAL MEDICINE | Facility: CLINIC | Age: 78
End: 2018-01-01

## 2018-01-01 ENCOUNTER — COMMUNICATION - HEALTHEAST (OUTPATIENT)
Dept: ADMINISTRATIVE | Facility: CLINIC | Age: 78
End: 2018-01-01

## 2018-01-01 ENCOUNTER — COMMUNICATION - HEALTHEAST (OUTPATIENT)
Dept: TELEHEALTH | Facility: CLINIC | Age: 78
End: 2018-01-01

## 2018-01-01 ENCOUNTER — SURGERY - HEALTHEAST (OUTPATIENT)
Dept: SURGERY | Facility: CLINIC | Age: 78
End: 2018-01-01

## 2018-01-01 DIAGNOSIS — E78.00 PURE HYPERCHOLESTEROLEMIA: ICD-10-CM

## 2018-01-01 DIAGNOSIS — N18.4 CKD (CHRONIC KIDNEY DISEASE), STAGE IV (H): ICD-10-CM

## 2018-01-01 DIAGNOSIS — E11.9 DIABETES TYPE 2, CONTROLLED (H): ICD-10-CM

## 2018-01-01 DIAGNOSIS — I50.22 CHRONIC SYSTOLIC HEART FAILURE (H): ICD-10-CM

## 2018-01-01 DIAGNOSIS — N18.5 CKD (CHRONIC KIDNEY DISEASE) STAGE 5, GFR LESS THAN 15 ML/MIN (H): ICD-10-CM

## 2018-01-01 DIAGNOSIS — G47.9 SLEEP DIFFICULTIES: ICD-10-CM

## 2018-01-01 DIAGNOSIS — F33.0 MAJOR DEPRESSIVE DISORDER, RECURRENT EPISODE, MILD (H): ICD-10-CM

## 2018-01-01 DIAGNOSIS — I48.0 PAROXYSMAL ATRIAL FIBRILLATION (H): ICD-10-CM

## 2018-01-01 DIAGNOSIS — R41.89 COGNITIVE AND BEHAVIORAL CHANGES: ICD-10-CM

## 2018-01-01 DIAGNOSIS — R46.89 COGNITIVE AND BEHAVIORAL CHANGES: ICD-10-CM

## 2018-01-01 DIAGNOSIS — R13.10 DYSPHAGIA, UNSPECIFIED TYPE: ICD-10-CM

## 2018-01-01 DIAGNOSIS — E03.9 ACQUIRED HYPOTHYROIDISM: ICD-10-CM

## 2018-01-01 DIAGNOSIS — S72.001A HIP FRACTURE, RIGHT (H): ICD-10-CM

## 2018-01-01 DIAGNOSIS — S72.143A HIP FRACTURE, INTERTROCHANTERIC (H): ICD-10-CM

## 2018-01-01 DIAGNOSIS — R41.0 DELIRIUM: ICD-10-CM

## 2018-01-01 DIAGNOSIS — I63.9 ACUTE EMBOLIC STROKE (H): ICD-10-CM

## 2018-01-01 DIAGNOSIS — I10 ESSENTIAL HYPERTENSION, BENIGN: ICD-10-CM

## 2018-01-01 DIAGNOSIS — R13.10 DYSPHAGIA: ICD-10-CM

## 2018-01-01 DIAGNOSIS — I34.0 MODERATE MITRAL REGURGITATION: ICD-10-CM

## 2018-01-01 DIAGNOSIS — R52 PAIN: ICD-10-CM

## 2018-01-01 DIAGNOSIS — I35.0 NONRHEUMATIC AORTIC VALVE STENOSIS: ICD-10-CM

## 2018-01-01 DIAGNOSIS — R53.1 GENERALIZED WEAKNESS: ICD-10-CM

## 2018-01-01 DIAGNOSIS — I10 ESSENTIAL HYPERTENSION: ICD-10-CM

## 2018-01-01 DIAGNOSIS — N39.0 UTI (URINARY TRACT INFECTION): ICD-10-CM

## 2018-01-01 DIAGNOSIS — E87.5 HYPERKALEMIA: ICD-10-CM

## 2018-01-01 DIAGNOSIS — E11.22 TYPE 2 DIABETES MELLITUS WITH STAGE 5 CHRONIC KIDNEY DISEASE NOT ON CHRONIC DIALYSIS, WITHOUT LONG-TERM CURRENT USE OF INSULIN (H): ICD-10-CM

## 2018-01-01 DIAGNOSIS — E55.9 VITAMIN D DEFICIENCY: ICD-10-CM

## 2018-01-01 DIAGNOSIS — S72.009A HIP FRACTURE (H): ICD-10-CM

## 2018-01-01 DIAGNOSIS — F03.91 DEMENTIA WITH BEHAVIORAL DISTURBANCE, UNSPECIFIED DEMENTIA TYPE: ICD-10-CM

## 2018-01-01 DIAGNOSIS — N18.5 TYPE 2 DIABETES MELLITUS WITH STAGE 5 CHRONIC KIDNEY DISEASE NOT ON CHRONIC DIALYSIS, WITHOUT LONG-TERM CURRENT USE OF INSULIN (H): ICD-10-CM

## 2018-01-01 DIAGNOSIS — Z71.89 GOALS OF CARE, COUNSELING/DISCUSSION: ICD-10-CM

## 2018-01-01 DIAGNOSIS — Z51.5 HOSPICE CARE PATIENT: ICD-10-CM

## 2018-01-01 LAB
25(OH)D3 SERPL-MCNC: 22.1 NG/ML (ref 30–80)
25(OH)D3 SERPL-MCNC: 27.6 NG/ML (ref 30–80)
25(OH)D3 SERPL-MCNC: 28.6 NG/ML (ref 30–80)
25(OH)D3 SERPL-MCNC: 33 NG/ML (ref 30–80)
25(OH)D3 SERPL-MCNC: 41.3 NG/ML (ref 30–80)
ALBUMIN UR-MCNC: ABNORMAL MG/DL
AMORPH CRY #/AREA URNS HPF: ABNORMAL /[HPF]
ANION GAP SERPL CALCULATED.3IONS-SCNC: 11 MMOL/L (ref 5–18)
ANION GAP SERPL CALCULATED.3IONS-SCNC: 12 MMOL/L (ref 5–18)
ANION GAP SERPL CALCULATED.3IONS-SCNC: 9 MMOL/L (ref 5–18)
APPEARANCE UR: CLEAR
BACTERIA #/AREA URNS HPF: ABNORMAL HPF
BACTERIA SPEC CULT: NO GROWTH
BASOPHILS # BLD AUTO: 0.1 THOU/UL (ref 0–0.2)
BASOPHILS NFR BLD AUTO: 1 % (ref 0–2)
BILIRUB UR QL STRIP: NEGATIVE
BUN SERPL-MCNC: 43 MG/DL (ref 8–28)
BUN SERPL-MCNC: 45 MG/DL (ref 8–28)
BUN SERPL-MCNC: 49 MG/DL (ref 8–28)
CALCIUM SERPL-MCNC: 8.7 MG/DL (ref 8.5–10.5)
CALCIUM SERPL-MCNC: 8.7 MG/DL (ref 8.5–10.5)
CALCIUM SERPL-MCNC: 8.8 MG/DL (ref 8.5–10.5)
CHLORIDE BLD-SCNC: 104 MMOL/L (ref 98–107)
CHLORIDE BLD-SCNC: 107 MMOL/L (ref 98–107)
CHLORIDE BLD-SCNC: 111 MMOL/L (ref 98–107)
CO2 SERPL-SCNC: 19 MMOL/L (ref 22–31)
CO2 SERPL-SCNC: 21 MMOL/L (ref 22–31)
CO2 SERPL-SCNC: 22 MMOL/L (ref 22–31)
COLOR UR AUTO: YELLOW
CREAT SERPL-MCNC: 4.59 MG/DL (ref 0.7–1.3)
CREAT SERPL-MCNC: 4.86 MG/DL (ref 0.7–1.3)
CREAT SERPL-MCNC: 5.19 MG/DL (ref 0.7–1.3)
EOSINOPHIL # BLD AUTO: 0.7 THOU/UL (ref 0–0.4)
EOSINOPHIL NFR BLD AUTO: 8 % (ref 0–6)
ERYTHROCYTE [DISTWIDTH] IN BLOOD BY AUTOMATED COUNT: 12.6 % (ref 11–14.5)
ERYTHROCYTE [DISTWIDTH] IN BLOOD BY AUTOMATED COUNT: 13.9 % (ref 11–14.5)
GFR SERPL CREATININE-BSD FRML MDRD: 11 ML/MIN/1.73M2
GFR SERPL CREATININE-BSD FRML MDRD: 12 ML/MIN/1.73M2
GFR SERPL CREATININE-BSD FRML MDRD: 13 ML/MIN/1.73M2
GLUCOSE BLD-MCNC: 103 MG/DL (ref 70–125)
GLUCOSE BLD-MCNC: 135 MG/DL (ref 70–125)
GLUCOSE BLD-MCNC: 140 MG/DL (ref 70–125)
GLUCOSE UR STRIP-MCNC: ABNORMAL MG/DL
GRAN CASTS #/AREA URNS LPF: ABNORMAL LPF
HBA1C MFR BLD: 5.8 % (ref 4.2–6.1)
HCT VFR BLD AUTO: 28.7 % (ref 40–54)
HCT VFR BLD AUTO: 30.1 % (ref 40–54)
HGB BLD-MCNC: 10.1 G/DL (ref 14–18)
HGB BLD-MCNC: 9 G/DL (ref 14–18)
HGB UR QL STRIP: NEGATIVE
INR PPP: 1 (ref 0.9–1.1)
INR PPP: 2.3 (ref 0.9–1.1)
KETONES UR STRIP-MCNC: NEGATIVE MG/DL
LEUKOCYTE ESTERASE UR QL STRIP: NEGATIVE
LYMPHOCYTES # BLD AUTO: 2.1 THOU/UL (ref 0.8–4.4)
LYMPHOCYTES NFR BLD AUTO: 25 % (ref 20–40)
MAGNESIUM SERPL-MCNC: 2.3 MG/DL (ref 1.8–2.6)
MAGNESIUM SERPL-MCNC: 2.5 MG/DL (ref 1.8–2.6)
MCH RBC QN AUTO: 31 PG (ref 27–34)
MCH RBC QN AUTO: 32.7 PG (ref 27–34)
MCHC RBC AUTO-ENTMCNC: 31.4 G/DL (ref 32–36)
MCHC RBC AUTO-ENTMCNC: 33.6 G/DL (ref 32–36)
MCV RBC AUTO: 97 FL (ref 80–100)
MCV RBC AUTO: 99 FL (ref 80–100)
MONOCYTES # BLD AUTO: 0.8 THOU/UL (ref 0–0.9)
MONOCYTES NFR BLD AUTO: 10 % (ref 2–10)
MUCOUS THREADS #/AREA URNS LPF: ABNORMAL LPF
NEUTROPHILS # BLD AUTO: 4.6 THOU/UL (ref 2–7.7)
NEUTROPHILS NFR BLD AUTO: 56 % (ref 50–70)
NITRATE UR QL: NEGATIVE
PH UR STRIP: 5.5 [PH] (ref 4.5–8)
PLATELET # BLD AUTO: 180 THOU/UL (ref 140–440)
PLATELET # BLD AUTO: 205 THOU/UL (ref 140–440)
PMV BLD AUTO: 8.5 FL (ref 8.5–12.5)
PMV BLD AUTO: 8.7 FL (ref 8.5–12.5)
POTASSIUM BLD-SCNC: 3.6 MMOL/L (ref 3.5–5)
POTASSIUM BLD-SCNC: 4.5 MMOL/L (ref 3.5–5)
POTASSIUM BLD-SCNC: 4.6 MMOL/L (ref 3.5–5)
RBC # BLD AUTO: 2.9 MILL/UL (ref 4.4–6.2)
RBC # BLD AUTO: 3.09 MILL/UL (ref 4.4–6.2)
RBC #/AREA URNS AUTO: ABNORMAL HPF
SODIUM SERPL-SCNC: 136 MMOL/L (ref 136–145)
SODIUM SERPL-SCNC: 138 MMOL/L (ref 136–145)
SODIUM SERPL-SCNC: 142 MMOL/L (ref 136–145)
SP GR UR STRIP: 1.02 (ref 1–1.03)
SQUAMOUS #/AREA URNS AUTO: ABNORMAL LPF
TSH SERPL DL<=0.005 MIU/L-ACNC: 0.87 UIU/ML (ref 0.3–5)
URATE CRY #/AREA URNS HPF: PRESENT /[HPF]
UROBILINOGEN UR STRIP-ACNC: ABNORMAL
VIT B12 SERPL-MCNC: 556 PG/ML (ref 213–816)
WBC #/AREA URNS AUTO: ABNORMAL HPF
WBC: 13.3 THOU/UL (ref 4–11)
WBC: 8.2 THOU/UL (ref 4–11)

## 2018-01-01 ASSESSMENT — MIFFLIN-ST. JEOR
SCORE: 1453.53
SCORE: 1347.22
SCORE: 1331.8

## 2021-05-30 VITALS — BODY MASS INDEX: 29.16 KG/M2 | WEIGHT: 175 LBS | HEIGHT: 65 IN

## 2021-05-30 VITALS — WEIGHT: 179 LBS | HEIGHT: 65 IN | BODY MASS INDEX: 29.82 KG/M2

## 2021-05-30 VITALS — WEIGHT: 179 LBS | BODY MASS INDEX: 29.82 KG/M2 | HEIGHT: 65 IN

## 2021-05-30 VITALS — BODY MASS INDEX: 30.45 KG/M2 | WEIGHT: 183 LBS

## 2021-05-30 VITALS — WEIGHT: 174.38 LBS | HEIGHT: 65 IN | BODY MASS INDEX: 29.05 KG/M2

## 2021-05-30 VITALS — HEIGHT: 65 IN | BODY MASS INDEX: 28.16 KG/M2 | WEIGHT: 169 LBS

## 2021-05-31 VITALS — WEIGHT: 171.8 LBS | BODY MASS INDEX: 27.73 KG/M2

## 2021-05-31 VITALS — WEIGHT: 174.8 LBS | BODY MASS INDEX: 28.21 KG/M2

## 2021-05-31 VITALS — BODY MASS INDEX: 28.41 KG/M2 | WEIGHT: 176 LBS

## 2021-05-31 VITALS — BODY MASS INDEX: 28.21 KG/M2 | HEIGHT: 66 IN | WEIGHT: 175.56 LBS

## 2021-05-31 VITALS — WEIGHT: 178 LBS | BODY MASS INDEX: 28.73 KG/M2

## 2021-05-31 VITALS — BODY MASS INDEX: 28.25 KG/M2 | WEIGHT: 175 LBS

## 2021-05-31 VITALS — WEIGHT: 175 LBS | BODY MASS INDEX: 28.25 KG/M2

## 2021-05-31 VITALS — WEIGHT: 176.2 LBS | BODY MASS INDEX: 28.44 KG/M2

## 2021-05-31 VITALS — HEIGHT: 66 IN | BODY MASS INDEX: 28.2 KG/M2 | WEIGHT: 175.44 LBS

## 2021-05-31 VITALS — BODY MASS INDEX: 28.47 KG/M2 | HEIGHT: 66 IN | WEIGHT: 177.13 LBS

## 2021-05-31 VITALS — BODY MASS INDEX: 28.61 KG/M2 | HEIGHT: 66 IN | WEIGHT: 178 LBS

## 2021-05-31 VITALS — WEIGHT: 172 LBS | BODY MASS INDEX: 27.76 KG/M2

## 2021-05-31 VITALS — WEIGHT: 174.2 LBS | BODY MASS INDEX: 28.12 KG/M2

## 2021-06-01 VITALS — BODY MASS INDEX: 25.99 KG/M2 | WEIGHT: 161 LBS

## 2021-06-01 VITALS — WEIGHT: 171 LBS | BODY MASS INDEX: 27.6 KG/M2

## 2021-06-01 VITALS — WEIGHT: 153 LBS | BODY MASS INDEX: 24.69 KG/M2

## 2021-06-01 VITALS — WEIGHT: 161 LBS | BODY MASS INDEX: 25.99 KG/M2

## 2021-06-01 VITALS — BODY MASS INDEX: 26.97 KG/M2 | WEIGHT: 167.1 LBS

## 2021-06-01 VITALS — BODY MASS INDEX: 27.76 KG/M2 | WEIGHT: 172 LBS

## 2021-06-01 VITALS — BODY MASS INDEX: 28.25 KG/M2 | WEIGHT: 175 LBS

## 2021-06-01 VITALS — WEIGHT: 148.6 LBS | HEIGHT: 66 IN | BODY MASS INDEX: 23.88 KG/M2

## 2021-06-08 NOTE — PROGRESS NOTES
"OV     1/19/2017  Assessment:     1. Diabetes type 2, controlled  Comprehensive Metabolic Panel    Glycosylated Hemoglobin A1c   2. Benign Essential Hypertension  Comprehensive Metabolic Panel    furosemide (LASIX) 40 MG tablet   3. Essential Hypercholesterolemia  Lipid Cascade    Comprehensive Metabolic Panel   4. Hypothyroidism (acquired)  Thyroid Stimulating Hormone (TSH)   5. Hx Acute embolic stroke  INR   6. Ecchymoses, spontaneous  HM2(CBC w/o Differential)       Plan:      Fasting labs were drawn. Blood sugars are under adequate control. We reviewed his current medications and he will continue the same pending additional lab results. We reviewed dietary recommendations, including low salt and high fiber diet, and recommendations for regular exercise/activity. He will follow up with Neurology and with the neuropsych testing. He will plan to follow up in 3-4 mos for repeat fasting labs and med check, sooner if any difficulties.      Subjective:   Fasting today? Yes  Diabetes      Scout Batista is a 76 y.o. male who presents for follow-up of Type 2 diabetes mellitus. Compliance with treatment has been good. Current symptoms/problems include none. Patient denies foot ulcerations, hypoglycemia , increased appetite, nausea, paresthesia of the feet and vomiting. Home sugars: BGs consistently in an acceptable range. Current monitoring regimen: home blood tests - 2-3 times daily. Any episodes of hypoglycemia? no. Weight trend: stable. Last dilated eye exam: unsure, advised to schedule.      Current diabetic medications include oral agents: glipizide (generic). Current side effects: none. Prior visit with dietician: yes. Current diet: in general, a \"healthy\" diet.  Current exercise: no regular exercise.        Hyperlipidemia & Hypertension    Patient is also here for follow-up of hypertension and dyslipidemia. A repeat fasting lipid profile was done. Compliance with treatment has been good. Patient denies muscle pain " "associated with his medications. Cardiac symptoms: dyspnea and fatigue. Patient denies chest pain, exertional chest pressure/discomfort, fatigue and lower extremity edema or headache. Previous history of cardiac disease includes: paroxysmal a fib, aortic stenosis, cardiomyopathy, mitral regurgitation.       He recently started aricept per Neurology. He denies any side effects from that. He will be undergoing neuropsychiatric testing soon.     The following portions of the patient's history were reviewed and updated as appropriate: allergies, current medications, past family history, past medical history, past social history, past surgical history and problem list.    Review of Systems  Pertinent items are noted in HPI.    He reports some frequent bruising recently.     Objective:        Visit Vitals     /72     Pulse 92     Ht 5' 5\" (1.651 m)     Wt 175 lb (79.4 kg)     BMI 29.12 kg/m2     General:    Alert, cooperative, no distress   Head:    Normocephalic, without obvious abnormality, atraumatic   Eyes:    PERRL, conjunctiva/corneas clear, EOM's intact    Ears:    Normal TM's and external ear canals, both ears   Nose:   Nares normal, mucosa normal, no drainage or sinus tenderness   Throat:   Lips, mucosa, and tongue normal; teeth and gums normal   Neck:   Supple, symmetrical,  no adenopathy;  thyroid:  normal   Back:     Symmetric, ROM normal, no CVA tenderness   Lungs:     Clear to auscultation bilaterally, respirations unlabored   CV:    Regular rate and rhythm   Abdomen:     Soft, non-tender, bowel sounds active all four quadrants,     no masses, no organomegaly   Extremities:   Extremities normal, atraumatic, no cyanosis or edema   Pulses:   2+ and symmetric all extremities   Skin:   Skin color, texture, turgor normal, no rashes or lesions   Neurologic:   CNII-XII intact, normal strength, sensation and reflexes     throughout       Laboratory:  Results for orders placed or performed in visit on 01/19/17 "   Lipid Cascade   Result Value Ref Range    Cholesterol 180 <=199 mg/dL    Triglycerides 292 (H) <=149 mg/dL    HDL Cholesterol 33 (L) >=40 mg/dL    LDL Calculated 89 <=129 mg/dL    Patient Fasting > 8hrs? No    Comprehensive Metabolic Panel   Result Value Ref Range    Sodium 140 136 - 145 mmol/L    Potassium 3.9 3.5 - 5.0 mmol/L    Chloride 108 (H) 98 - 107 mmol/L    CO2 19 (L) 22 - 31 mmol/L    Anion Gap, Calculation 13 5 - 18 mmol/L    Glucose 179 (H) 70 - 125 mg/dL    BUN 43 (H) 8 - 28 mg/dL    Creatinine 4.10 (H) 0.70 - 1.30 mg/dL    GFR MDRD Af Amer 17 (L) >60 mL/min/1.73m2    GFR MDRD Non Af Amer 14 (L) >60 mL/min/1.73m2    Bilirubin, Total 0.4 0.0 - 1.0 mg/dL    Calcium 8.4 (L) 8.5 - 10.5 mg/dL    Protein, Total 7.0 6.0 - 8.0 g/dL    Albumin 3.2 (L) 3.5 - 5.0 g/dL    Alkaline Phosphatase 94 45 - 120 U/L    AST 32 0 - 40 U/L    ALT 39 0 - 45 U/L   Glycosylated Hemoglobin A1c   Result Value Ref Range    Hemoglobin A1c 6.5 (H) 3.5 - 6.0 %   Thyroid Stimulating Hormone (TSH)   Result Value Ref Range    TSH 0.45 0.30 - 5.00 uIU/mL   INR   Result Value Ref Range    INR 1.80 (H) 0.90 - 1.10   HM2(CBC w/o Differential)   Result Value Ref Range    WBC 9.8 4.0 - 11.0 thou/uL    RBC 3.76 (L) 4.40 - 6.20 mill/uL    Hemoglobin 12.0 (L) 14.0 - 18.0 g/dL    Hematocrit 34.9 (L) 40.0 - 54.0 %    MCV 93 80 - 100 fL    MCH 31.9 27.0 - 34.0 pg    MCHC 34.4 32.0 - 36.0 g/dL    RDW 11.6 11.0 - 14.5 %    Platelets 241 140 - 440 thou/uL    MPV 6.4 (L) 7.0 - 10.0 fL

## 2021-06-09 NOTE — PROGRESS NOTES
Assessment:       1. Benign Essential Hypertension  Basic Metabolic Panel   2. Edema, unspecified type  Basic Metabolic Panel    Thyroid Cascade    Compression stockings   3. CKD (chronic kidney disease) stage 5, GFR less than 15 ml/min  HM2(CBC w/o Differential)    Compression stockings   4. Acute embolic stroke  INR           Plan:        Fasting labs were reviewed. Blood pressure is under adequate control. We reviewed his current medications and he will continue the same pending additional lab results. I recommended that they check back with nephrology regarding how long he should be on the higher lasix dosing. We reviewed dietary recommendations, including low salt and high fiber diet, and recommendations for regular exercise/activity. He will plan to follow up in 1-2 mos for repeat fasting labs and med check, sooner if any difficulties.         Subjective:      Hyperlipidemia & Hypertension      Patient is here for follow-up of hypertension and dyslipidemia. His fasting lipid profile was reviewed done. Compliance with treatment has been fair. Patient denies muscle pain associated with his medications. Cardiac symptoms: moderate lower extremity edema, improved. Patient denies chest pain, dyspnea, exertional chest pressure/discomfort, fatigue and palpitations. The patient exercises infrequently. Weight trend: fluctuating a bit. Previous history of cardiac disease includes: cardiomyopathy, aortic stenosis and A fib.       He saw nephrology recently and was advised to increase his lasix. He has had some improvement in the edema but it has not resolved completely. Saturday had weeping and severe edema, L>R, and swelling on his lower back. Weight has been up 5 lb or more - back down since. He eats out freq, [employer] normally. Healthy Choice Meals, cereal at home.     The following portions of the patient's history were reviewed and updated as appropriate: allergies, current medications, past family history, past  "medical history, past social history, past surgical history and problem list.    Review of Systems  Pertinent items are noted in HPI.        Objective:      /52  Pulse 72  Ht 5' 5\" (1.651 m)  Wt 174 lb 6 oz (79.1 kg)  BMI 29.02 kg/m2     General:    Alert, cooperative, no distress   Head:    Normocephalic, without obvious abnormality, atraumatic   Eyes:    PERRL, conjunctiva/corneas clear, EOM's intact    Ears:    Normal TM's and external ear canals, both ears   Nose:   Nares normal, mucosa normal, no drainage or sinus tenderness   Throat:   Lips, mucosa, and tongue normal; teeth and gums normal   Neck:   Supple, symmetrical,  no adenopathy;  thyroid:  normal   Back:     Symmetric, ROM normal, no CVA tenderness   Lungs:     Clear to auscultation bilaterally, respirations unlabored   CV:    Regular rate and rhythm   Abdomen:     Soft, non-tender, no masses, no organomegaly   Extremities:   Extremities normal, atraumatic, no cyanosis or edema   Pulses:   2+ and symmetric all extremities   Skin:   Skin color, texture, turgor normal, no rashes or lesions   Neurologic:   normal strength and tone throughout         "

## 2021-06-09 NOTE — PROGRESS NOTES
Assessment:     1. Edema, unspecified type  Comprehensive Metabolic Panel    BNP(B-type Natriuretic Peptide)    HM1(CBC and Differential)    Electrocardiogram Perform - Clinic    HM1 (CBC with Diff)    Urinalysis-UC if Indicated   2. CKD (chronic kidney disease) stage 5, GFR less than 15 ml/min  Comprehensive Metabolic Panel    BNP(B-type Natriuretic Peptide)    HM1(CBC and Differential)    Electrocardiogram Perform - Clinic    HM1 (CBC with Diff)   3. Chronic left systolic heart failure  BNP(B-type Natriuretic Peptide)   4. Benign Essential Hypertension  furosemide (LASIX) 40 MG tablet   5. Hypokalemia  potassium chloride (KLOR-CON) 10 MEQ CR tablet          Plan:     Unclear as to the relatively rapid onset of fairly significant lower extremity edema.  EKG appears unchanged.  No other significant symptoms.  Given that other than the lower extremity edema he has fairly asymptomatic, will increase his Lasix to 80 mg in the morning, 40 mg in the afternoon over the next couple of days until he follows up with his primary care physician Dr. Roldan.  His CMP done today is notable for potassium that is slightly low.  He does not take any supplemental potassium.  We will send a prescription to his pharmacy for him to take some supple mental potassium in addition to the furosemide.  Keeping his legs elevated as much as possible when at rest.  May need a repeat echocardiogram for further evaluation.    Subjective:       76 y.o. male with stage V chronic kidney disease, type 2 diabetes, left systolic heart failure, and nonischemic cardiomyopathy presents for evaluation of a couple day history of progressively worsening edema in both lower extremities.  He denies any lower leg pain, redness, chest pain, shortness of breath, dyspnea on exertion, orthopnea, or PND.  He saw his nephrologist a couple of days ago who noticed some slight increase in swelling and advised him to increase his Lasix from 40 mg daily to 80 mg daily.   He has been doing this over the past 3 days but his legs continue to get more and more edematous and are now to the point that they are starting to be leaky.  His weight is increased 14 pounds in the past month or so.  He does not wear compression stockings.  He goes to the bathroom about 2-3 times per day and is not really increased his urine output with the increase in Lasix over the past couple days.    The following portions of the patient's history were reviewed and updated as appropriate: allergies, current medications, past family history, past medical history, past social history, past surgical history and problem list.    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     Diabetes type 2, controlled     Essential Hypercholesterolemia     Benign Essential Hypertension     Diabetes With Renal Manifestations     Major depressive disorder, recurrent episode, mild     Chronic left systolic heart failure     Paroxysmal atrial fibrillation     Moderate to severe mitral regurgitation     Aortic stenosis     Dysphagia     Acute embolic stroke     CKD (chronic kidney disease) stage 5, GFR less than 15 ml/min     Nonischemic cardiomyopathy     Mild nonproliferative diabetic retinopathy     Hypothyroidism (acquired)     Episodic memory loss     Diarrhea     Edema     Past Medical History:   Diagnosis Date     Aortic stenosis 5/4/2015     Benign Essential Hypertension     Created by Conversion      Chronic Kidney Disease, Stage 3     Created by Conversion      Diabetes With Renal Manifestations     Created by Conversion      Essential Hypercholesterolemia     Created by Conversion      Type 2 Diabetes Mellitus     Created by Conversion      Vertigo     Created by Conversion      Past Surgical History:   Procedure Laterality Date     CATARACT EXTRACTION       Social History     Social History     Marital status:      Spouse name: N/A      Number of children: N/A     Years of education: N/A     Occupational History     Not on file.     Social History Main Topics     Smoking status: Former Smoker     Quit date: 5/26/1977     Smokeless tobacco: Never Used     Alcohol use No     Drug use: No     Sexual activity: Not on file     Other Topics Concern     Not on file     Social History Narrative       Review of Systems  A 12 point comprehensive review of systems was negative except as noted.     Objective:      /80 (Patient Site: Right Arm, Patient Position: Sitting, Cuff Size: Adult Regular)  Pulse 80  Temp 98.1  F (36.7  C) (Oral)   Resp 18  Wt 183 lb (83 kg)  SpO2 99%  BMI 30.45 kg/m2  General appearance: alert, appears stated age and cooperative  Neck: no adenopathy, no carotid bruit, no JVD, supple, symmetrical, trachea midline and thyroid not enlarged, symmetric, no tenderness/mass/nodules  Lungs: clear to auscultation bilaterally  Heart: regular rate and rhythm, S1, S2 normal, no murmur, click, rub or gallop  Abdomen: soft, non-tender; bowel sounds normal; no masses,  no organomegaly  Extremities: Patient is noted to have 3+ pitting edema in his lower extremities up to his knee on the right and up to his mid thigh on the left and there is no erythema or calf tenderness.  Skin: Skin color, texture, turgor normal. No rashes or lesions     Review his echocardiogram from April 2016 reviewed showing severe global hypokinesis with an estimated EF of 25%.  Previous labs reviewed from January 2016.  Previous EKG done from 2015 reviewed and compared to the one today.  He is noted now to have a left bundle branch block.    Recent Results (from the past 24 hour(s))   Comprehensive Metabolic Panel   Result Value Ref Range    Sodium 140 136 - 145 mmol/L    Potassium 3.4 (L) 3.5 - 5.0 mmol/L    Chloride 100 98 - 107 mmol/L    CO2 28 22 - 31 mmol/L    Anion Gap, Calculation 12 5 - 18 mmol/L    Glucose 133 (H) 70 - 125 mg/dL    BUN 31 (H) 8 - 28 mg/dL     Creatinine 3.67 (H) 0.70 - 1.30 mg/dL    GFR MDRD Af Amer 20 (L) >60 mL/min/1.73m2    GFR MDRD Non Af Amer 16 (L) >60 mL/min/1.73m2    Bilirubin, Total 0.4 0.0 - 1.0 mg/dL    Calcium 8.7 8.5 - 10.5 mg/dL    Protein, Total 7.4 6.0 - 8.0 g/dL    Albumin 2.9 (L) 3.5 - 5.0 g/dL    Alkaline Phosphatase 95 45 - 120 U/L    AST 34 0 - 40 U/L    ALT 30 0 - 45 U/L   BNP(B-type Natriuretic Peptide)   Result Value Ref Range     (H) 0 - 76 pg/mL   HM1 (CBC with Diff)   Result Value Ref Range    WBC 9.3 4.0 - 11.0 thou/uL    RBC 3.16 (L) 4.40 - 6.20 mill/uL    Hemoglobin 10.1 (L) 14.0 - 18.0 g/dL    Hematocrit 29.0 (L) 40.0 - 54.0 %    MCV 92 80 - 100 fL    MCH 32.0 27.0 - 34.0 pg    MCHC 34.9 32.0 - 36.0 g/dL    RDW 12.1 11.0 - 14.5 %    Platelets 240 140 - 440 thou/uL    MPV 5.8 (L) 7.0 - 10.0 fL    Neutrophils % 64 50 - 70 %    Lymphocytes % 21 20 - 40 %    Monocytes % 12 (H) 2 - 10 %    Eosinophils % 4 0 - 6 %    Basophils % 1 0 - 2 %    Neutrophils Absolute 5.9 2.0 - 7.7 thou/uL    Lymphocytes Absolute 1.9 0.8 - 4.4 thou/uL    Monocytes Absolute 1.1 (H) 0.0 - 0.9 thou/uL    Eosinophils Absolute 0.3 0.0 - 0.4 thou/uL    Basophils Absolute 0.1 0.0 - 0.2 thou/uL   Electrocardiogram Perform - Clinic   Result Value Ref Range    SYSTOLIC BLOOD PRESSURE  mmHg    DIASTOLIC BLOOD PRESSURE  mmHg    VENTRICULAR RATE 68 BPM    ATRIAL RATE 68 BPM    P-R INTERVAL 232 ms    QRS DURATION 134 ms    Q-T INTERVAL 468 ms    QTC CALCULATION (BEZET) 497 ms    P Axis 78 degrees    R AXIS -60 degrees    T AXIS 13 degrees    MUSE DIAGNOSIS       Sinus rhythm with 1st degree A-V block  Left bundle branch block  Abnormal ECG  When compared with ECG of 26-MAY-2015 15:18,  Left bundle branch block has replaced Non-specific intra-ventricular conduction block     Urinalysis-UC if Indicated   Result Value Ref Range    Color, UA Yellow Colorless, Yellow, Straw, Light Yellow    Clarity, UA Clear Clear    Glucose, UA Negative Negative    Bilirubin, UA  Negative Negative    Ketones, UA Negative Negative    Specific Gravity, UA 1.010 1.005 - 1.030    Blood, UA Small (!) Negative    pH, UA 6.0 5.0 - 8.0    Protein, UA Trace (!) Negative mg/dL    Urobilinogen, UA 0.2 E.U./dL 0.2 E.U./dL, 1.0 E.U./dL    Nitrite, UA Negative Negative    Leukocytes, UA Negative Negative    Bacteria, UA None Seen None Seen hpf    RBC, UA 0-2 None Seen, 0-2 hpf    WBC, UA None Seen None Seen, 0-5 hpf    Squam Epithel, UA None Seen None Seen, 0-5 lpf     BNP and CMP are pending.    This note has been dictated using voice recognition software. Any grammatical or context distortions are unintentional and inherent to the software

## 2021-06-09 NOTE — PROGRESS NOTES
"    SUBJECTIVE: Scout Batista is a 76 y.o.  male who presents today because he was found to have a supratherapeutic INR at 6.4 today.  When he filled out his questionnaire he had to admit that he was having bleeding from the rectal area.  Because of this he needed to be seen.  He had a recent bout of diarrhea.  He had to wipe himself frequently and became irritated.  He tells me he had a very small amount of blood on the tissue from wiping.  Yesterday his diarrhea was much improved.  He is eating better now.  When he was having his infectious diarrhea about, he was not eating well and we discussed this could be why his INR is up.  He has some stool cultures in process which she brought in for evaluation.    OBJECTIVE:   Visit Vitals     /68     Pulse 72     Ht 5' 5\" (1.651 m)     Wt 169 lb (76.7 kg)     BMI 28.12 kg/m2     General: Elderly gentleman in no acute distress  Heart: Regular rate and rhythm without murmur  Lungs: Clear bilaterally  Abdomen: Soft, nontender    ASSESSMENT & PLAN:    1. Supratherapeutic INR     2. Bright red blood per rectum     3. Diarrhea       I think we have a reason for his elevated INR and his small amount of rectal bleeding.  He denies any previous hemorrhoids.  He denies any measurable bleeding.  He was told to hold his Coumadin for tonight and likely he'll get a call from the anticoagulation nurses later today with further directions.  He can follow up at his usual interval.    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     Diabetes type 2, controlled     Essential Hypercholesterolemia     Benign Essential Hypertension     Diabetes With Renal Manifestations     Major depressive disorder, recurrent episode, mild     Chronic left systolic heart failure     Paroxysmal atrial fibrillation     Moderate to severe mitral regurgitation     Aortic stenosis     Dysphagia     Acute embolic stroke     " CKD (chronic kidney disease) stage 5, GFR less than 15 ml/min     Nonischemic cardiomyopathy     Mild nonproliferative diabetic retinopathy     Hypothyroidism (acquired)     Episodic memory loss     Diarrhea       Current Outpatient Prescriptions on File Prior to Visit   Medication Sig Dispense Refill     amiodarone (PACERONE) 200 MG tablet Take 1 tablet (200 mg total) by mouth every other day. 90 tablet 2     carvedilol (COREG) 25 MG tablet Take 1 tablet (25 mg total) by mouth 2 (two) times a day with meals. 180 tablet 1     docoshexanoic acid-eicosapent 500 mg (FISH OIL) 500-100 mg cap capsule Take 1,000 mg by mouth daily.       furosemide (LASIX) 40 MG tablet Take 1 tablet (40 mg total) by mouth daily. 90 tablet 1     glipiZIDE (GLUCOTROL) 2.5 MG 24 hr tablet TAKE ONE TABLET BY MOUTH ONE TIME DAILY before breakfast 90 tablet 1     isosorbide mononitrate (IMDUR) 30 MG 24 hr tablet Take 2 tablets (60 mg total) by mouth daily. 180 tablet 1     levothyroxine (SYNTHROID, LEVOTHROID) 100 MCG tablet Take 1 tablet (100 mcg total) by mouth Daily at 6:00 am. 90 tablet 1     multivit-min-FA-lycopen-lutein (CENTRUM SILVER) 0.4-300-250 mg-mcg-mcg tablet Take 1 tablet by mouth daily.       simvastatin (ZOCOR) 20 MG tablet TAKE 1 TABLET (20 MG) BY MOUTH BEDTIME 90 tablet 3     warfarin (COUMADIN) 1 MG tablet Take one to two tablets (1 - 2 mg) by mouth daily as directed. Dose adjusted according to INR result. 180 tablet 1     docusate sodium (COLACE) 100 MG capsule Take 1 capsule (100 mg total) by mouth 2 (two) times a day as needed for constipation. 60 capsule 5     donepezil (ARICEPT) 5 MG tablet Take 5 mg by mouth bedtime.       triamcinolone (KENALOG) 0.5 % ointment Apply bid to affected area for 2-3 weeks 30 g 2     No current facility-administered medications on file prior to visit.

## 2021-06-10 NOTE — PROGRESS NOTES
"Cardiology Progress Note    Assessment:  Dilated cardiomyopathy, likely nonischemic, moderately to severely depressed LV systolic function, functional class 1-2  Paroxysmal atrial fibrillation, no symptomatic episodes, on warfarin and amiodarone  Aortic stenosis, unlikely severe, probably moderate  Mitral regurgitation, functional , mild to moderate  Chronic kidney disease stage III   Cognitive impairment, dementia, progressive  CVA, no residual focal deficits  Lower extremities edema with near normal BNP and no pulmonary congestion raises suspicion of noncardiac etiology    Plan:  Echocardiogram to reassess LV function and valvular abnormalities.  Continue current cardiac medications  Routine follow-up in 6 months    Subjective:   This is 76 y.o. male who comes in today for follow-up visit.  I saw him back in November.  He had some weight gain but no peripheral edema.  BNP was normal.  I did not change any of his heart medications.  Later in the winter he developed progressive leg swelling with some weeping.  He was placed on high-dose of diuretics.  He has had good improvement.  He denies increasing shortness of breath.  He does not have PND orthopnea.  He has not had chest pains or heart palpitations.    Review of Systems:   General: Weight Gain  Eyes: WNL  Ears/Nose/Throat: WNL  Lungs: WNL  Heart: Leg Swelling  Stomach: WNL  Bladder: WNL  Muscle/Joints: WNL  Skin: Rash  Nervous System: WNL  Mental Health: WNL     Blood: Easy Bruising    Objective:   /90 (Patient Site: Right Arm, Patient Position: Sitting, Cuff Size: Adult Regular)  Pulse 72  Resp 16  Ht 5' 5\" (1.651 m)  Wt 179 lb (81.2 kg)  BMI 29.79 kg/m2  Physical Exam:  GENERAL: no distress  NECK: No JVD  LUNGS: Clear to auscultation.  CARDIAC: regular rhythm, S1 & S2 normal.  No heaves, thrills, gallops or murmurs.  ABDOMEN: flat, negative hepatosplenomegaly, soft and non-tender.  EXTREMITIES: No evidence of cyanosis, clubbing 1+edema.    Current " Outpatient Prescriptions   Medication Sig Dispense Refill     amiodarone (PACERONE) 200 MG tablet Take 1 tablet (200 mg total) by mouth every other day. 90 tablet 2     carvedilol (COREG) 25 MG tablet Take 1 tablet (25 mg total) by mouth 2 (two) times a day with meals. 180 tablet 1     docoshexanoic acid-eicosapent 500 mg (FISH OIL) 500-100 mg cap capsule Take 1,000 mg by mouth daily.       docusate sodium (COLACE) 100 MG capsule Take 1 capsule (100 mg total) by mouth 2 (two) times a day as needed for constipation. 60 capsule 5     donepezil (ARICEPT) 5 MG tablet Take 5 mg by mouth bedtime.       furosemide (LASIX) 40 MG tablet 80 mg in am, 40 mg in afternoon 90 tablet 1     glipiZIDE (GLUCOTROL) 2.5 MG 24 hr tablet TAKE ONE TABLET BY MOUTH ONE TIME DAILY before breakfast 90 tablet 1     isosorbide mononitrate (IMDUR) 30 MG 24 hr tablet Take 2 tablets (60 mg total) by mouth daily. 180 tablet 1     levothyroxine (SYNTHROID, LEVOTHROID) 100 MCG tablet Take 1 tablet (100 mcg total) by mouth Daily at 6:00 am. 90 tablet 1     multivit-min-FA-lycopen-lutein (CENTRUM SILVER) 0.4-300-250 mg-mcg-mcg tablet Take 1 tablet by mouth daily.       potassium chloride (KLOR-CON) 10 MEQ CR tablet Take 1 tablet (10 mEq total) by mouth daily. 30 tablet 1     simvastatin (ZOCOR) 20 MG tablet TAKE 1 TABLET (20 MG) BY MOUTH BEDTIME 90 tablet 3     triamcinolone (KENALOG) 0.5 % ointment Apply bid to affected area for 2-3 weeks 30 g 2     warfarin (COUMADIN) 1 MG tablet Take one to two tablets (1 - 2 mg) by mouth daily as directed. Dose adjusted according to INR result. 180 tablet 1     No current facility-administered medications for this visit.        Cardiographics:    Stress Test: May 2015 1. Lexiscan stress nuclear study is negative for inducible myocardial ischemia.  2. There is a medium size moderate intensity fixed defect in the inferior wall  representing an area of myocardial infarction.  3. Left ventricular size is normal. Left  ventricular systolic function is moderate  to severely reduced with an LVEF of 34%.     Echo: May 2016 This study is of poor quality and comparison with the prior study (images  were personally reviewed) is not deemed to be reliable.  No M-mode left ventricular measurements were made by the technician.  Global hypokinesis of the left ventricle with severe impairment of  systolic function.  Left ventricular ejection fraction is visually estimated to be 25%.  Right ventricular systolic performance cannot be assessed due to limited  visualization of the right ventricle.  Individual aortic valve leaflets are not clearly visualized.  No evidence of aortic valve regurgitation.  Mean transaortic gradient (Doppler) is 10 mm Hg.  The reduced mitral leaflet separation suggests decreased flow through the  mitral valve and reduced cardiac output.  The Doppler study is of limited quality; no mitral regurgitation was  identified though it was clearly evident on the prior study.  Lab Results:       Lab Results   Component Value Date    CHOL 180 01/19/2017    CHOL 207 (H) 09/29/2016    CHOL 154 05/06/2015     Lab Results   Component Value Date    HDL 33 (L) 01/19/2017    HDL 29 (L) 09/29/2016    HDL 24 (L) 05/06/2015     Lab Results   Component Value Date    LDLCALC 89 01/19/2017    LDLCALC  09/29/2016      Comment:      Invalid, Triglycerides >400    LDLCALC 99 05/06/2015     Lab Results   Component Value Date    TRIG 292 (H) 01/19/2017    TRIG 718 (H) 09/29/2016    TRIG 154 (H) 05/06/2015     No components found for: CHOLHDL  BNP   Date Value Ref Range Status   04/02/2017 129 (H) 0 - 76 pg/mL Final       Yonis (Emilio Blue MD

## 2021-06-13 NOTE — PROGRESS NOTES
[facility]      Facility:    [facility] [[id number]]  Code Status: DNR/DNI      Chief Complaint/Reason for Visit:  Chief Complaint   Patient presents with     Review Of Multiple Medical Conditions       HPI:   Scout is a 77 y.o. male who is recent transfer from [facility].  He has a past medical history of congestive heart failure with systolic dysfunction, diabetes, chronic kidney disease stage IV now 5 and hypertension who lives in his own home.  He was brought to the ER with severe weakness and unsteady steady gait with confusion.  He has had progressive cognitive decline as there have been concern if he has been taking his medications as scheduled.  Initial workup was sepsis infection related which yielded unremarkable results.  His initial creatinine was 5.44.  He was taken off his Lasix and fluid resuscitated though without improvement of kidney function.  Apparently he is refused dialysis.  Per nephrology his baseline creatinine is 3-4.  He has been discharged to the TCU on low dose diuretics, furosemide 40 mg twice daily due to his systolic heart failure.  He is anticoagulated on Coumadin for underlying A. fib.  Also per his diabetes he was taken off his glipizide due to renal impairment and started on insulin NovoLog sliding scale.  Is also found to be anemic and given IM in the hospital.  He was also evaluated by neurology and imaging was inconclusive.  He is now transferred to the TCU in need of alternate long-term placement.    TCU:  Scout is a 77-year-old male with limited concerns.  His blood sugars are still elevated between 140s-200s on sliding scale, so increase Lantus to 8 units in the a.m. and monitor.  He is maintained on his fluid restriction of 1500mL and diuresis as his weights are stable at 174 pounds.  His blood pressures are still elevated so I will increase his hydralazine to 20 mg 3 times daily, and this has been effective.  His any  coagulation is therapeutic with Coumadin 3 mg daily with a recheck an INR on 11/9.  He denies any pain or any other issues for that matter.  He continues with therapy without any concern.    Patient denies pain, headache, chest pain, numbness or tingling, shortest of breath, eating or swallowing concerns, nausea or vomiting, diarrhea or bowel abnormalities, or no new integumentary concerns today.      Past Medical History:  Past Medical History:   Diagnosis Date     Aortic stenosis 5/4/2015     Benign Essential Hypertension     Created by Conversion      Chronic Kidney Disease, Stage 3     Created by Conversion      Diabetes With Renal Manifestations     Created by Conversion      Essential Hypercholesterolemia     Created by Conversion      Type 2 Diabetes Mellitus     Created by Conversion      Vertigo     Created by Conversion            Surgical History:  Past Surgical History:   Procedure Laterality Date     CATARACT EXTRACTION         Family History:   Family History   Problem Relation Age of Onset     Heart disease Mother      Mental illness Father        Social History:    Social History     Social History     Marital status:      Spouse name: N/A     Number of children: N/A     Years of education: N/A     Social History Main Topics     Smoking status: Former Smoker     Quit date: 5/26/1977     Smokeless tobacco: Never Used     Alcohol use No     Drug use: No     Sexual activity: Not on file     Other Topics Concern     Not on file     Social History Narrative          Review of Systems   Constitutional: Positive for activity change and fatigue. Negative for appetite change, diaphoresis and fever.        No concerns   HENT: Negative for congestion, facial swelling and hearing loss.    Eyes: Negative for photophobia and visual disturbance.   Respiratory: Negative for apnea, shortness of breath and wheezing.    Cardiovascular: Negative for chest pain.   Gastrointestinal: Negative for abdominal  distention, abdominal pain, blood in stool, constipation and diarrhea.   Endocrine: Positive for polyuria.   Genitourinary: Positive for frequency. Negative for dysuria.   Skin:        Intact   Allergic/Immunologic: Negative.    Neurological: Negative for dizziness, speech difficulty, weakness and light-headedness.   Hematological: Negative.    Psychiatric/Behavioral: Positive for confusion. Negative for agitation, hallucinations and sleep disturbance. The patient is not nervous/anxious.        Vitals:    11/06/17 0923   BP: 118/81   Pulse: 72   Resp: 18   Temp: 98  F (36.7  C)   SpO2: 96%   Weight: 171 lb 12.8 oz (77.9 kg)       Physical Exam   Constitutional: He appears well-developed. No distress.   No acute concerns   HENT:   Head: Normocephalic and atraumatic.   Mouth/Throat: Oropharynx is clear and moist.   Eyes: Right eye exhibits no discharge. Left eye exhibits no discharge. No scleral icterus.   Neck: Normal range of motion. Neck supple. No JVD present.   Cardiovascular: Exam reveals no gallop and no friction rub.    Murmur heard.  Irregular rate and rhythm 2/6 CARMEN LSB   Pulmonary/Chest: Effort normal and breath sounds normal. No stridor. He has no wheezes. He has no rales.   CTA   Abdominal: Soft. Bowel sounds are normal. He exhibits no distension. There is no tenderness.   No constipation or diarrhea   Genitourinary:   Genitourinary Comments: Deferred   Musculoskeletal: He exhibits no edema, tenderness or deformity.   Ambulate herself   Neurological:   Alert and oriented ×1   Skin: Skin is warm and dry. He is not diaphoretic.   Intact   Psychiatric: He has a normal mood and affect.   No anxiety or depression, no behavioral issues       Medication List:  Current Outpatient Prescriptions   Medication Sig     amiodarone (PACERONE) 200 MG tablet Take 1 tablet (200 mg total) by mouth every other day.     carvedilol (COREG) 25 MG tablet Take 25 mg by mouth 2 (two) times a day with meals.     docoshexanoic  acid-eicosapent 500 mg (FISH OIL) 500-100 mg cap capsule Take 1,000 mg by mouth daily.     furosemide (LASIX) 40 MG tablet Take 1 tablet (40 mg total) by mouth 2 (two) times a day at 9am and 6pm.     hydrALAZINE (APRESOLINE) 10 MG tablet Take 20 mg by mouth 3 (three) times a day.      insulin glargine (LANTUS) 100 unit/mL injection Inject 8 Units under the skin every morning.      isosorbide mononitrate (IMDUR) 30 MG 24 hr tablet Take 2 tablets (60 mg total) by mouth daily.     levothyroxine (SYNTHROID, LEVOTHROID) 100 MCG tablet Take 100 mcg by mouth daily.     multivit-min-FA-lycopen-lutein (CENTRUM SILVER) 0.4-300-250 mg-mcg-mcg tablet Take 1 tablet by mouth daily.     NOVOLOG 100 unit/mL injection Check blood sugar three (3) times daily.  11.65 Type 2 with hyperglycemia     potassium chloride (KLOR-CON) 10 MEQ CR tablet Take 1 tablet (10 mEq total) by mouth daily.     simvastatin (ZOCOR) 20 MG tablet Take 20 mg by mouth at bedtime.     tamsulosin (FLOMAX) 0.4 mg Cp24 Take 1 capsule (0.4 mg total) by mouth daily after supper.     triamcinolone (KENALOG) 0.5 % ointment Apply bid to affected area for 2-3 weeks (Patient taking differently: Apply 1 application topically 2 (two) times a day. Apply bid to affected area for 2-3 weeks)     warfarin (COUMADIN) 1 MG tablet Take 1.5 tablets (1.5 mg total) by mouth daily. Until next INR and then as directed for goal INR of 2-3 (Patient taking differently: Take 3 mg by mouth daily. INR 2.3 on coumadin 3.5mg daily. Recheck 11/6  Until next INR and then as directed for goal INR of 2-3)       Labs:  Recent Results (from the past 168 hour(s))   Basic Metabolic Panel    Collection Time: 11/02/17  5:41 AM   Result Value Ref Range    Sodium 139 136 - 145 mmol/L    Potassium 3.8 3.5 - 5.0 mmol/L    Chloride 102 98 - 107 mmol/L    CO2 24 22 - 31 mmol/L    Anion Gap, Calculation 13 5 - 18 mmol/L    Glucose 166 (H) 70 - 125 mg/dL    Calcium 9.0 8.5 - 10.5 mg/dL    BUN 63 (H) 8 - 28  mg/dL    Creatinine 5.17 (H) 0.70 - 1.30 mg/dL    GFR MDRD Af Amer 13 (L) >60 mL/min/1.73m2    GFR MDRD Non Af Amer 11 (L) >60 mL/min/1.73m2   Vitamin D, Total (25-Hydroxy)    Collection Time: 11/02/17  5:41 AM   Result Value Ref Range    Vitamin D, Total (25-Hydroxy) 31.9 30.0 - 80.0 ng/mL   Magnesium    Collection Time: 11/02/17  5:41 AM   Result Value Ref Range    Magnesium 2.0 1.8 - 2.6 mg/dL   HM2(CBC w/o Differential)    Collection Time: 11/02/17  5:41 AM   Result Value Ref Range    WBC 9.6 4.0 - 11.0 thou/uL    RBC 3.59 (L) 4.40 - 6.20 mill/uL    Hemoglobin 11.6 (L) 14.0 - 18.0 g/dL    Hematocrit 34.6 (L) 40.0 - 54.0 %    MCV 96 80 - 100 fL    MCH 32.3 27.0 - 34.0 pg    MCHC 33.5 32.0 - 36.0 g/dL    RDW 13.4 11.0 - 14.5 %    Platelets 240 140 - 440 thou/uL    MPV 9.2 8.5 - 12.5 fL       Assessment:    ICD-10-CM    1. Diabetes type 2, controlled E11.9    2. Essential Hypercholesterolemia E78.00    3. Chronic systolic heart failure I50.22    4. Paroxysmal atrial fibrillation I48.0    5. CKD (chronic kidney disease) stage 5, GFR less than 15 ml/min N18.5        Plan:  1.  Blood sugars 150s-200s, increase Lantus to 8 units in the a.m., last A1c 7.1 on 10/22  2.  Increase hydralazine 20 mg 3 times daily, monitor  3.  Continue furosemide 40 mg twice daily, weight loss to 171  4.  INR 2.3 on Coumadin 3mg, recheck 11/9  5.  Last Cr 5.17 with GFR 11 on 11/2, BMP recheck today    The care plan has been reviewed and all orders signed. Changes to care plan, if any, as noted. Otherwise, continue care plan of care.      Electronically signed by: Reji Dougherty NP

## 2021-06-13 NOTE — PROGRESS NOTES
[facility]      Code Status:  DNR/DNI  Visit Type: Review Of Multiple Medical Conditions     Facility:  [facility] [[id number]]           History of Present Illness: Scout Batista is a 77 y.o. male who is currently admitted as a transfer from the hospital.  This is a 77-year-old who lives in his own home with underlying history of congestive heart failure with systolic dysfunction along with diabetes; chronic kidney disease stage IV and hypertension along with a previous history of CVA who was brought in to the emergency room with underlying history of severe weakness unsteady gait.  He also has had a progressive cognitive decline with confusion.  There is also some concern if he has been taking his medications as scheduled due to his progressive confusion and he was admitted in the hospital.  Initial workup for sepsis/infection including CBC BMP EG and x-rays were unremarkable.  He was noted to have chronic kidney disease stage IV with an admission creatinine of 5.44.  He was taken off his Lasix and fluids were given but no improvement in kidney function.  In the past he has refused dialysis.  Nephrology did see this patient and his renal function did improve with a creatinine between 3's and 4.  He has been discharged on a low-dose diuretic and close monitoring of his kidney functions has been recommended by them.  He also has underlying history of systolic heart failure and was felt to be euvolemic.  He continues to be on Coumadin for anticoagulation with underlying history of atrial fibrillation.  Diabetes control was adequate and he was taken off glipizide due to renal impairment with insulin sliding scale given.BG are elavated now.  He also is anemic and was given IM in the hospital.  Patient remained quite confused with initial slums of 11/30.  His care was reviewed with his primary neurologist who recommended no change in his medications they have recommended TCU  .  MRI did not show any new changes it has been felt that with his progressive cognitive impairment he may need alternative placement  Remains confused but pleasant.    Past Medical History:   Diagnosis Date     Aortic stenosis 5/4/2015     Benign Essential Hypertension     Created by Conversion      Chronic Kidney Disease, Stage 3     Created by Conversion      Diabetes With Renal Manifestations     Created by Conversion      Essential Hypercholesterolemia     Created by Conversion      Type 2 Diabetes Mellitus     Created by Conversion      Vertigo     Created by Conversion      Past Surgical History:   Procedure Laterality Date     CATARACT EXTRACTION       Family History   Problem Relation Age of Onset     Heart disease Mother      Mental illness Father      Social History     Social History     Marital status:      Spouse name: N/A     Number of children: N/A     Years of education: N/A     Occupational History     Not on file.     Social History Main Topics     Smoking status: Former Smoker     Quit date: 5/26/1977     Smokeless tobacco: Never Used     Alcohol use No     Drug use: No     Sexual activity: Not on file     Other Topics Concern     Not on file     Social History Narrative     Current Outpatient Prescriptions   Medication Sig Dispense Refill     amiodarone (PACERONE) 200 MG tablet Take 1 tablet (200 mg total) by mouth every other day. 90 tablet 2     carvedilol (COREG) 25 MG tablet Take 25 mg by mouth 2 (two) times a day with meals.       docoshexanoic acid-eicosapent 500 mg (FISH OIL) 500-100 mg cap capsule Take 1,000 mg by mouth daily.       furosemide (LASIX) 40 MG tablet Take 1 tablet (40 mg total) by mouth 2 (two) times a day at 9am and 6pm.  0     hydrALAZINE (APRESOLINE) 10 MG tablet Take 20 mg by mouth 2 (two) times a day.       isosorbide mononitrate (IMDUR) 30 MG 24 hr tablet Take 2 tablets (60 mg total) by mouth daily. 180 tablet 1     levothyroxine (SYNTHROID, LEVOTHROID) 100 MCG  tablet Take 100 mcg by mouth daily.       multivit-min-FA-lycopen-lutein (CENTRUM SILVER) 0.4-300-250 mg-mcg-mcg tablet Take 1 tablet by mouth daily.       NOVOLOG 100 unit/mL injection Check blood sugar three (3) times daily.  11.65 Type 2 with hyperglycemia 10 mL PRN     potassium chloride (KLOR-CON) 10 MEQ CR tablet Take 1 tablet (10 mEq total) by mouth daily. 30 tablet 5     simvastatin (ZOCOR) 20 MG tablet Take 20 mg by mouth at bedtime.       tamsulosin (FLOMAX) 0.4 mg Cp24 Take 1 capsule (0.4 mg total) by mouth daily after supper.  0     triamcinolone (KENALOG) 0.5 % ointment Apply bid to affected area for 2-3 weeks (Patient taking differently: Apply 1 application topically 2 (two) times a day. Apply bid to affected area for 2-3 weeks) 30 g 2     warfarin (COUMADIN) 1 MG tablet Take 1.5 tablets (1.5 mg total) by mouth daily. Until next INR and then as directed for goal INR of 2-3  0     No current facility-administered medications for this visit.      No Known Allergies      Review of Systems:    Constitutional: Negative.  Negative for fever, chills, has activity change, appetite change and fatigue.   HENT: Negative for congestion and facial swelling.    Eyes: Negative for photophobia, redness and visual disturbance.   Respiratory: Negative for cough and chest tightness.    Cardiovascular: Negative for chest pain, palpitations and leg swelling.   Gastrointestinal: Negative for nausea, diarrhea, constipation, blood in stool and abdominal distention.   Genitourinary: Negative.    Musculoskeletal: Negative. Has had weakness and trouble walking   Skin: Negative.    Neurological: Negative for dizziness, tremors, syncope, weakness, light-headedness and headaches.   Hematological: Does not bruise/bleed easily.   Psychiatric/Behavioral: Negative.  more confused    Vitals:    11/01/17 1020   BP: 137/79   Pulse: 72   Resp: 18   Temp: 98  F (36.7  C)       Physical Exam:    GENERAL: no acute distress. Cooperative in  conversation.   HEENT: pupils are equal, round and reactive. Oral mucosa is moist and intact.  RESP:Chest symmetric. Regular respiratory rate. No stridor.  CVS: S1S2; has a murmer  ABD: Nondistended, soft.  EXTREMITIES: No lower extremity edema.   NEURO: non focal. Alert and oriented x3.   PSYCH: within normal limits. No depression or anxiety.  SKIN: warm dry intact       Labs:    Results for orders placed or performed during the hospital encounter of 10/21/17   Basic Metabolic Panel   Result Value Ref Range    Sodium 138 136 - 145 mmol/L    Potassium 3.6 3.5 - 5.0 mmol/L    Chloride 106 98 - 107 mmol/L    CO2 22 22 - 31 mmol/L    Anion Gap, Calculation 10 5 - 18 mmol/L    Glucose 162 (H) 70 - 125 mg/dL    Calcium 8.2 (L) 8.5 - 10.5 mg/dL    BUN 57 (H) 8 - 28 mg/dL    Creatinine 4.69 (H) 0.70 - 1.30 mg/dL    GFR MDRD Af Amer 15 (L) >60 mL/min/1.73m2    GFR MDRD Non Af Amer 12 (L) >60 mL/min/1.73m2     INR 1.7      Assessment/Plan:      Cognitive impairment with progressive confusion.  He was admitted with acute toxic encephalopathy.  Initial slums was 11/30  Neurology did see patient and an MRI was repeated but did not show any acute findings and it has been recommended he should be in the TCU for rehab.    He will be monitored for improvement in cognitive status but may need alternative placement living independently alone in his own home. None noted so far.  Acute renal insufficiency in the setting of chronic kidney disease stage IV.  Patient was seen by nephrology given IV hydration and his diuretics were held.  Renal function remains impaired though he did improve and he has been discharged on a low-dose of Lasix twice a day.  Close monitoring of renal function will be done in the TCU.  He needs to see nephrology as an outpatient and we will be monitoring his renal functions here.   in the past he has refused hemodialysis.r/c bmp in am  Congestive heart failure chronic with systolic dysfunction appear to be  euvolemic in the hospital with no evidence of volume overload  His weights will be monitored in the TCU.  Currently discharged on a low-dose of Lasix twice a day  A. fib continues on his anticoagulation monitor INRs  Currently rate controlled on Coreg along with amiodarone  Hypertension on multiple medications including Coreg, hydralazine  Diabetes type 2 he was on oral glipizide which was held due to renal impairment.  He has been discharged on insulin sliding scale.  Blood sugars are elevated.  We will monitor his creatinine tomorrow and my suspicion is if his creatinine is not improved he will need to go on a low-dose of insulin.  Unfortunately this will increase the burden of care for him as he certainly is not a candidate for self administering his insulin,  Hyperlipidemia he is on simvastatin  Hypothyroidism on replacement Synthroid  BPH given Flomax  Anemia of chronic kidney disease given IV iron ×1. monitor hemoglobins  Gait instability with progressive weakness felt to be partly due to medication noncompliance cognitive decline as well as progressive renal impairment  Patient is very confused. he feels he is back to baseline continue to monitor mood and behaviors  Family is requesting a DNR/DNI status for him.  He is probably going to need alternative placement at the time of his discharge.he has no behaviors and it stable.SW to remain involved with discharge planning and need for alternative placement if possible.  Total time spent was 35 minutes, more than half of it was in face-to-face counseling regarding disease state, treatment, side effects, documentation, review of clinical data and coordination of care    Electronically signed by: CARA Carey  This progress note was completed using Dragon software and there may be grammatical errors.

## 2021-06-13 NOTE — PROGRESS NOTES
[facility]      Facility:    [facility] [[id number]]  Code Status: DNR/DNI      Chief Complaint/Reason for Visit:  Chief Complaint   Patient presents with     Review Of Multiple Medical Conditions       HPI:   Scout is a 77 y.o. male who is recent transfer from [facility].  He has a past medical history of congestive heart failure with systolic dysfunction, diabetes, chronic kidney disease stage IV now 5 and hypertension who lives in his own home.  He was brought to the ER with severe weakness and unsteady steady gait with confusion.  He has had progressive cognitive decline as there have been concern if he has been taking his medications as scheduled.  Initial workup was sepsis infection related which yielded unremarkable results.  His initial creatinine was 5.44.  He was taken off his Lasix and fluid resuscitated though without improvement of kidney function.  Apparently he is refused dialysis.  Per nephrology his baseline creatinine is 3-4.  He has been discharged to the TCU on low dose diuretics, furosemide 40 mg twice daily due to his systolic heart failure.  He is anticoagulated on Coumadin for underlying A. fib.  Also per his diabetes he was taken off his glipizide due to renal impairment and started on insulin NovoLog sliding scale.  Is also found to be anemic and given IM in the hospital.  He was also evaluated by neurology and imaging was inconclusive.  He is now transferred to the TCU in need of alternate long-term placement.    TCU:  Scout is a 77-year-old male with limited concerns.  I do notice his blood sugars have been elevated between 170s-200s on sliding scale, so I will start Lantus 3 units in the a.m. and monitor.  He is maintained on his fluid restriction of 1500mL and diuresis as his weights are stable at 174 pounds.  His blood pressures are still elevated so I will increase his hydralazine to 20 mg 3 times daily.  His any coagulation is  therapeutic with Coumadin 3.5 mg daily with a recheck an INR on 11/6.  He denies any pain or any other issues for that matter.  He continues with therapy without any concern.    Patient denies pain, headache, chest pain, numbness or tingling, shortest of breath, eating or swallowing concerns, nausea or vomiting, diarrhea or bowel abnormalities, or no new integumentary concerns today.      Past Medical History:  Past Medical History:   Diagnosis Date     Aortic stenosis 5/4/2015     Benign Essential Hypertension     Created by Conversion      Chronic Kidney Disease, Stage 3     Created by Conversion      Diabetes With Renal Manifestations     Created by Conversion      Essential Hypercholesterolemia     Created by Conversion      Type 2 Diabetes Mellitus     Created by Conversion      Vertigo     Created by Conversion            Surgical History:  Past Surgical History:   Procedure Laterality Date     CATARACT EXTRACTION         Family History:   Family History   Problem Relation Age of Onset     Heart disease Mother      Mental illness Father        Social History:    Social History     Social History     Marital status:      Spouse name: N/A     Number of children: N/A     Years of education: N/A     Social History Main Topics     Smoking status: Former Smoker     Quit date: 5/26/1977     Smokeless tobacco: Never Used     Alcohol use No     Drug use: No     Sexual activity: Not on file     Other Topics Concern     Not on file     Social History Narrative          Review of Systems   Constitutional: Positive for activity change and fatigue. Negative for appetite change, diaphoresis and fever.        No concerns   HENT: Negative for congestion, facial swelling and hearing loss.    Eyes: Negative for photophobia and visual disturbance.   Respiratory: Negative for apnea, shortness of breath and wheezing.    Cardiovascular: Negative for chest pain.   Gastrointestinal: Negative for abdominal distention, abdominal  pain, blood in stool, constipation and diarrhea.   Endocrine: Positive for polyuria.   Genitourinary: Positive for frequency. Negative for dysuria.   Skin:        Intact   Allergic/Immunologic: Negative.    Neurological: Negative for dizziness, speech difficulty, weakness and light-headedness.   Hematological: Negative.    Psychiatric/Behavioral: Positive for confusion. Negative for agitation, hallucinations and sleep disturbance. The patient is not nervous/anxious.        Vitals:    11/03/17 1150   BP: 163/78   Pulse: 64   Resp: 18   Temp: 97.7  F (36.5  C)   SpO2: 94%   Weight: 174 lb 12.8 oz (79.3 kg)       Physical Exam   Constitutional: He appears well-developed. No distress.   No acute concerns   HENT:   Head: Normocephalic and atraumatic.   Mouth/Throat: Oropharynx is clear and moist.   Eyes: Right eye exhibits no discharge. Left eye exhibits no discharge. No scleral icterus.   Neck: Normal range of motion. Neck supple. No JVD present.   Cardiovascular: Exam reveals no gallop and no friction rub.    Murmur heard.  Irregular rate and rhythm 2/6 CARMEN LSB   Pulmonary/Chest: Effort normal and breath sounds normal. No stridor. He has no wheezes. He has no rales.   CTA   Abdominal: Soft. Bowel sounds are normal. He exhibits no distension. There is no tenderness.   No constipation or diarrhea   Genitourinary:   Genitourinary Comments: Deferred   Musculoskeletal: He exhibits no edema, tenderness or deformity.   Ambulate herself   Neurological:   Alert and oriented ×1   Skin: Skin is warm and dry. He is not diaphoretic.   Intact   Psychiatric: He has a normal mood and affect.   No anxiety or depression, no behavioral issues       Medication List:  Current Outpatient Prescriptions   Medication Sig     insulin glargine (LANTUS) 100 unit/mL injection Inject 3 Units under the skin every morning.     amiodarone (PACERONE) 200 MG tablet Take 1 tablet (200 mg total) by mouth every other day.     carvedilol (COREG) 25 MG  tablet Take 25 mg by mouth 2 (two) times a day with meals.     docoshexanoic acid-eicosapent 500 mg (FISH OIL) 500-100 mg cap capsule Take 1,000 mg by mouth daily.     furosemide (LASIX) 40 MG tablet Take 1 tablet (40 mg total) by mouth 2 (two) times a day at 9am and 6pm.     hydrALAZINE (APRESOLINE) 10 MG tablet Take 20 mg by mouth 3 (three) times a day.      isosorbide mononitrate (IMDUR) 30 MG 24 hr tablet Take 2 tablets (60 mg total) by mouth daily.     levothyroxine (SYNTHROID, LEVOTHROID) 100 MCG tablet Take 100 mcg by mouth daily.     multivit-min-FA-lycopen-lutein (CENTRUM SILVER) 0.4-300-250 mg-mcg-mcg tablet Take 1 tablet by mouth daily.     NOVOLOG 100 unit/mL injection Check blood sugar three (3) times daily.  11.65 Type 2 with hyperglycemia     potassium chloride (KLOR-CON) 10 MEQ CR tablet Take 1 tablet (10 mEq total) by mouth daily.     simvastatin (ZOCOR) 20 MG tablet Take 20 mg by mouth at bedtime.     tamsulosin (FLOMAX) 0.4 mg Cp24 Take 1 capsule (0.4 mg total) by mouth daily after supper.     triamcinolone (KENALOG) 0.5 % ointment Apply bid to affected area for 2-3 weeks (Patient taking differently: Apply 1 application topically 2 (two) times a day. Apply bid to affected area for 2-3 weeks)     warfarin (COUMADIN) 1 MG tablet Take 1.5 tablets (1.5 mg total) by mouth daily. Until next INR and then as directed for goal INR of 2-3 (Patient taking differently: Take 1.5 mg by mouth daily. INR 2.3 on coumadin 3.5mg daily. Recheck 11/6  Until next INR and then as directed for goal INR of 2-3)       Labs:  Recent Results (from the past 168 hour(s))   POCT Glucose    Collection Time: 10/27/17 12:19 PM   Result Value Ref Range    Glucose,  mg/dL   Basic Metabolic Panel    Collection Time: 11/02/17  5:41 AM   Result Value Ref Range    Sodium 139 136 - 145 mmol/L    Potassium 3.8 3.5 - 5.0 mmol/L    Chloride 102 98 - 107 mmol/L    CO2 24 22 - 31 mmol/L    Anion Gap, Calculation 13 5 - 18 mmol/L     Glucose 166 (H) 70 - 125 mg/dL    Calcium 9.0 8.5 - 10.5 mg/dL    BUN 63 (H) 8 - 28 mg/dL    Creatinine 5.17 (H) 0.70 - 1.30 mg/dL    GFR MDRD Af Amer 13 (L) >60 mL/min/1.73m2    GFR MDRD Non Af Amer 11 (L) >60 mL/min/1.73m2   Vitamin D, Total (25-Hydroxy)    Collection Time: 11/02/17  5:41 AM   Result Value Ref Range    Vitamin D, Total (25-Hydroxy) 31.9 30.0 - 80.0 ng/mL   Magnesium    Collection Time: 11/02/17  5:41 AM   Result Value Ref Range    Magnesium 2.0 1.8 - 2.6 mg/dL   HM2(CBC w/o Differential)    Collection Time: 11/02/17  5:41 AM   Result Value Ref Range    WBC 9.6 4.0 - 11.0 thou/uL    RBC 3.59 (L) 4.40 - 6.20 mill/uL    Hemoglobin 11.6 (L) 14.0 - 18.0 g/dL    Hematocrit 34.6 (L) 40.0 - 54.0 %    MCV 96 80 - 100 fL    MCH 32.3 27.0 - 34.0 pg    MCHC 33.5 32.0 - 36.0 g/dL    RDW 13.4 11.0 - 14.5 %    Platelets 240 140 - 440 thou/uL    MPV 9.2 8.5 - 12.5 fL       Assessment:    ICD-10-CM    1. Diabetes type 2, controlled E11.9    2. Essential Hypercholesterolemia E78.00    3. Chronic systolic heart failure I50.22    4. Paroxysmal atrial fibrillation I48.0    5. CKD (chronic kidney disease) stage 5, GFR less than 15 ml/min N18.5    6. Acquired hypothyroidism E03.9        Plan:  1.  Blood sugars 170s-200, start Lantus 3 units in the a.m., last A1c 7.1 on 10/22  2.  Increase hydralazine 20 mg 3 times daily, monitor  3.  Continue furosemide 40 mg twice daily, weight stable at 174  4.  INR 2.3 on Coumadin 3.5 mg, recheck 11/6  5.  Last Cr 5.17 with GFR 11 on 11/2, BMP recheck on 11/6  6.  Last TSH 3.33 on 10/22    The care plan has been reviewed and all orders signed. Changes to care plan, if any, as noted. Otherwise, continue care plan of care.      Electronically signed by: Reji Dougherty NP

## 2021-06-13 NOTE — PROGRESS NOTES
[facility]      Code Status:  DNR/DNI  Visit Type: H & P     Facility:  [facility] [[id number]]           History of Present Illness: Scout Batista is a 77 y.o. male who is currently admitted as a transfer from the hospital.  This is a 77-year-old who lives in his own home with underlying history of congestive heart failure with systolic dysfunction along with diabetes; chronic kidney disease stage IV and hypertension along with a previous history of CVA who was brought in to the emergency room with underlying history of severe weakness unsteady gait.  He also has had a progressive cognitive decline with confusion.  There is also some concern if he has been taking his medications as scheduled due to his progressive confusion and he was admitted in the hospital.  Initial workup for sepsis/infection including CBC BMP EG and x-rays were unremarkable.  He was noted to have chronic kidney disease stage IV with an admission creatinine of 5.44.  He was taken off his Lasix and fluids were given but no improvement in kidney function.  In the past he has refused dialysis.  Nephrology did see this patient and his renal function did improve with a creatinine between 3's and 4.  He has been discharged on a low-dose diuretic and close monitoring of his kidney functions has been recommended by them.  He also has underlying history of systolic heart failure and was felt to be euvolemic.  He continues to be on Coumadin for anticoagulation with underlying history of atrial fibrillation.  Diabetes control was adequate and he was taken off glipizide due to renal impairment with insulin sliding scale given  He also is anemic and was given IM in the hospital.  Patient remained quite confused with initial slums of 11/30.  His care was reviewed with his primary neurologist who recommended no change in his medications they have recommended TCU .  MRI did not show any new changes it has been felt  that with his progressive cognitive impairment he may need alternative placement    Past Medical History:   Diagnosis Date     Aortic stenosis 5/4/2015     Benign Essential Hypertension     Created by Conversion      Chronic Kidney Disease, Stage 3     Created by Conversion      Diabetes With Renal Manifestations     Created by Conversion      Essential Hypercholesterolemia     Created by Conversion      Type 2 Diabetes Mellitus     Created by Conversion      Vertigo     Created by Conversion      Past Surgical History:   Procedure Laterality Date     CATARACT EXTRACTION       Family History   Problem Relation Age of Onset     Heart disease Mother      Mental illness Father      Social History     Social History     Marital status:      Spouse name: N/A     Number of children: N/A     Years of education: N/A     Occupational History     Not on file.     Social History Main Topics     Smoking status: Former Smoker     Quit date: 5/26/1977     Smokeless tobacco: Never Used     Alcohol use No     Drug use: No     Sexual activity: Not on file     Other Topics Concern     Not on file     Social History Narrative     Current Outpatient Prescriptions   Medication Sig Dispense Refill     amiodarone (PACERONE) 200 MG tablet Take 1 tablet (200 mg total) by mouth every other day. 90 tablet 2     carvedilol (COREG) 25 MG tablet Take 25 mg by mouth 2 (two) times a day with meals.       docoshexanoic acid-eicosapent 500 mg (FISH OIL) 500-100 mg cap capsule Take 1,000 mg by mouth daily.       furosemide (LASIX) 40 MG tablet Take 1 tablet (40 mg total) by mouth 2 (two) times a day at 9am and 6pm.  0     hydrALAZINE (APRESOLINE) 10 MG tablet Take 20 mg by mouth 2 (two) times a day.       isosorbide mononitrate (IMDUR) 30 MG 24 hr tablet Take 2 tablets (60 mg total) by mouth daily. 180 tablet 1     levothyroxine (SYNTHROID, LEVOTHROID) 100 MCG tablet Take 100 mcg by mouth daily.       multivit-min-FA-lycopen-lutein (CENTRUM  SILVER) 0.4-300-250 mg-mcg-mcg tablet Take 1 tablet by mouth daily.       NOVOLOG 100 unit/mL injection Check blood sugar three (3) times daily.  11.65 Type 2 with hyperglycemia 10 mL PRN     potassium chloride (KLOR-CON) 10 MEQ CR tablet Take 1 tablet (10 mEq total) by mouth daily. 30 tablet 5     simvastatin (ZOCOR) 20 MG tablet Take 20 mg by mouth at bedtime.       tamsulosin (FLOMAX) 0.4 mg Cp24 Take 1 capsule (0.4 mg total) by mouth daily after supper.  0     triamcinolone (KENALOG) 0.5 % ointment Apply bid to affected area for 2-3 weeks (Patient taking differently: Apply 1 application topically 2 (two) times a day. Apply bid to affected area for 2-3 weeks) 30 g 2     warfarin (COUMADIN) 1 MG tablet Take 1.5 tablets (1.5 mg total) by mouth daily. Until next INR and then as directed for goal INR of 2-3  0     No current facility-administered medications for this visit.      No Known Allergies      Review of Systems:    Constitutional: Negative.  Negative for fever, chills, has activity change, appetite change and fatigue.   HENT: Negative for congestion and facial swelling.    Eyes: Negative for photophobia, redness and visual disturbance.   Respiratory: Negative for cough and chest tightness.    Cardiovascular: Negative for chest pain, palpitations and leg swelling.   Gastrointestinal: Negative for nausea, diarrhea, constipation, blood in stool and abdominal distention.   Genitourinary: Negative.    Musculoskeletal: Negative. Has had weakness and trouble walking   Skin: Negative.    Neurological: Negative for dizziness, tremors, syncope, weakness, light-headedness and headaches.   Hematological: Does not bruise/bleed easily.   Psychiatric/Behavioral: Negative.  more confused    Vitals:    10/30/17 1053   BP: 144/82   Pulse: 63   Temp: 98  F (36.7  C)       Physical Exam:    GENERAL: no acute distress. Cooperative in conversation.   HEENT: pupils are equal, round and reactive. Oral mucosa is moist and  intact.  RESP:Chest symmetric. Regular respiratory rate. No stridor.  CVS: S1S2; has a murmer  ABD: Nondistended, soft.  EXTREMITIES: No lower extremity edema.   NEURO: non focal. Alert and oriented x3.   PSYCH: within normal limits. No depression or anxiety.  SKIN: warm dry intact       Labs:    Recent Results (from the past 240 hour(s))   HM2(CBC w/o Differential)   Result Value Ref Range    WBC 9.7 4.0 - 11.0 thou/uL    RBC 3.41 (L) 4.40 - 6.20 mill/uL    Hemoglobin 10.9 (L) 14.0 - 18.0 g/dL    Hematocrit 31.7 (L) 40.0 - 54.0 %    MCV 93 80 - 100 fL    MCH 32.0 27.0 - 34.0 pg    MCHC 34.4 32.0 - 36.0 g/dL    RDW 13.7 11.0 - 14.5 %    Platelets 195 140 - 440 thou/uL    MPV 8.8 8.5 - 12.5 fL   Basic Metabolic Panel   Result Value Ref Range    Sodium 139 136 - 145 mmol/L    Potassium 3.9 3.5 - 5.0 mmol/L    Chloride 108 (H) 98 - 107 mmol/L    CO2 20 (L) 22 - 31 mmol/L    Anion Gap, Calculation 11 5 - 18 mmol/L    Glucose 225 (H) 70 - 125 mg/dL    Calcium 8.3 (L) 8.5 - 10.5 mg/dL    BUN 40 (H) 8 - 28 mg/dL    Creatinine 3.85 (H) 0.70 - 1.30 mg/dL    GFR MDRD Af Amer 19 (L) >60 mL/min/1.73m2    GFR MDRD Non Af Amer 15 (L) >60 mL/min/1.73m2   ECG 12 lead nursing unit performed   Result Value Ref Range    SYSTOLIC BLOOD PRESSURE  mmHg    DIASTOLIC BLOOD PRESSURE  mmHg    VENTRICULAR RATE 70 BPM    ATRIAL RATE 70 BPM    P-R INTERVAL 202 ms    QRS DURATION 130 ms    Q-T INTERVAL 466 ms    QTC CALCULATION (BEZET) 503 ms    P Axis 0 degrees    R AXIS -66 degrees    T AXIS -3 degrees    MUSE DIAGNOSIS       Normal sinus rhythm  Left axis deviation  Non-specific intra-ventricular conduction block  Abnormal ECG  When compared with ECG of 02-APR-2017 11:44,  WA interval has decreased  Non-specific intra-ventricular conduction block has replaced Left bundle branch block  Confirmed by ED PACK MD LOC: ([id number]) on 10/22/2017 4:49:25 PM     Troponin I   Result Value Ref Range    Troponin I 0.07 0.00 - 0.29 ng/mL    Urinalysis-UC if Indicated   Result Value Ref Range    Color, UA Straw Colorless, Yellow, Straw, Light Yellow    Clarity, UA Clear Clear    Glucose, UA 50 mg/dL (!) Negative    Bilirubin, UA Negative Negative    Ketones, UA Negative Negative    Specific Gravity, UA 1.015 1.001 - 1.030    Blood, UA Small (!) Negative    pH, UA 6.0 4.5 - 8.0    Protein, UA 30 mg/dL (!) Negative mg/dL    Urobilinogen, UA <2.0 E.U./dL <2.0 E.U./dL, 2.0 E.U./dL    Nitrite, UA Negative Negative    Leukocytes, UA Negative Negative    Bacteria, UA None Seen None Seen hpf    RBC, UA 3-5 (!) None Seen, 0-2 hpf    WBC, UA 0-5 None Seen, 0-5 hpf    Squam Epithel, UA 0-5 None Seen, 0-5 lpf    Mucus, UA Few (!) None Seen lpf   POCT Glucose   Result Value Ref Range    Glucose,  mg/dL   Troponin I   Result Value Ref Range    Troponin I 0.08 0.00 - 0.29 ng/mL   INR   Result Value Ref Range    INR 1.46 (H) 0.90 - 1.10   Troponin I   Result Value Ref Range    Troponin I 0.09 0.00 - 0.29 ng/mL   Lipid Profile   Result Value Ref Range    Triglycerides 443 (H) <=149 mg/dL    Cholesterol 235 (H) <=199 mg/dL    LDL Calculated  <=129 mg/dL    HDL Cholesterol 26 (L) >=40 mg/dL   Magnesium   Result Value Ref Range    Magnesium 1.7 (L) 1.8 - 2.6 mg/dL   Phosphorus   Result Value Ref Range    Phosphorus 2.9 2.5 - 4.5 mg/dL   Glycosylated Hemoglobin A1C   Result Value Ref Range    Hemoglobin A1c 7.1 (H) 4.2 - 6.1 %   Thyroid Stimulating Hormone (TSH)   Result Value Ref Range    TSH 3.37 0.30 - 5.00 uIU/mL   POCT Glucose   Result Value Ref Range    Glucose,  mg/dL   POCT Glucose   Result Value Ref Range    Glucose,  mg/dL   POCT Glucose   Result Value Ref Range    Glucose,  mg/dL   ECG 12 lead MUSE   Result Value Ref Range    SYSTOLIC BLOOD PRESSURE  mmHg    DIASTOLIC BLOOD PRESSURE  mmHg    VENTRICULAR RATE 67 BPM    ATRIAL RATE 67 BPM    P-R INTERVAL 186 ms    QRS DURATION 136 ms    Q-T INTERVAL 472 ms    QTC CALCULATION (DOROTHYET)  498 ms    P Axis -21 degrees    R AXIS -63 degrees    T AXIS -31 degrees    MUSE DIAGNOSIS       Normal sinus rhythm  Left bundle branch block  Abnormal ECG  When compared with ECG of 21-OCT-2017 17:08,  Left bundle branch block has replaced Non-specific intra-ventricular conduction block  Confirmed by DE PACK MD LOC:SJ ([id number]) on 10/22/2017 5:06:56 PM     POCT Glucose   Result Value Ref Range    Glucose,  mg/dL   POCT Glucose   Result Value Ref Range    Glucose,  mg/dL   INR   Result Value Ref Range    INR 1.54 (H) 0.90 - 1.10   Basic Metabolic Panel   Result Value Ref Range    Sodium 139 136 - 145 mmol/L    Potassium 3.4 (L) 3.5 - 5.0 mmol/L    Chloride 102 98 - 107 mmol/L    CO2 24 22 - 31 mmol/L    Anion Gap, Calculation 13 5 - 18 mmol/L    Glucose 141 (H) 70 - 125 mg/dL    Calcium 9.0 8.5 - 10.5 mg/dL    BUN 52 (H) 8 - 28 mg/dL    Creatinine 4.71 (H) 0.70 - 1.30 mg/dL    GFR MDRD Af Amer 15 (L) >60 mL/min/1.73m2    GFR MDRD Non Af Amer 12 (L) >60 mL/min/1.73m2   Magnesium   Result Value Ref Range    Magnesium 1.9 1.8 - 2.6 mg/dL   HM1 (CBC with Diff)   Result Value Ref Range    WBC 11.1 (H) 4.0 - 11.0 thou/uL    RBC 3.58 (L) 4.40 - 6.20 mill/uL    Hemoglobin 11.6 (L) 14.0 - 18.0 g/dL    Hematocrit 33.1 (L) 40.0 - 54.0 %    MCV 93 80 - 100 fL    MCH 32.4 27.0 - 34.0 pg    MCHC 35.0 32.0 - 36.0 g/dL    RDW 13.6 11.0 - 14.5 %    Platelets 220 140 - 440 thou/uL    MPV 9.1 8.5 - 12.5 fL    Neutrophils % 67 50 - 70 %    Lymphocytes % 20 20 - 40 %    Monocytes % 11 (H) 2 - 10 %    Eosinophils % 2 0 - 6 %    Basophils % 0 0 - 2 %    Neutrophils Absolute 7.4 2.0 - 7.7 thou/uL    Lymphocytes Absolute 2.2 0.8 - 4.4 thou/uL    Monocytes Absolute 1.2 (H) 0.0 - 0.9 thou/uL    Eosinophils Absolute 0.2 0.0 - 0.4 thou/uL    Basophils Absolute 0.1 0.0 - 0.2 thou/uL   POCT Glucose   Result Value Ref Range    Glucose,  mg/dL   POCT Glucose   Result Value Ref Range    Glucose,  mg/dL   POCT  Glucose   Result Value Ref Range    Glucose,  mg/dL   POCT Glucose   Result Value Ref Range    Glucose,  mg/dL   POCT Glucose   Result Value Ref Range    Glucose,  mg/dL   Potassium   Result Value Ref Range    Potassium 3.6 3.5 - 5.0 mmol/L   POCT Glucose   Result Value Ref Range    Glucose,  mg/dL   INR   Result Value Ref Range    INR 1.79 (H) 0.90 - 1.10   Magnesium   Result Value Ref Range    Magnesium 2.0 1.8 - 2.6 mg/dL   Potassium   Result Value Ref Range    Potassium 3.7 3.5 - 5.0 mmol/L   Basic Metabolic Panel   Result Value Ref Range    Sodium 142 136 - 145 mmol/L    Potassium 3.7 3.5 - 5.0 mmol/L    Chloride 104 98 - 107 mmol/L    CO2 23 22 - 31 mmol/L    Anion Gap, Calculation 15 5 - 18 mmol/L    Glucose 162 (H) 70 - 125 mg/dL    Calcium 8.7 8.5 - 10.5 mg/dL    BUN 63 (H) 8 - 28 mg/dL    Creatinine 5.38 (H) 0.70 - 1.30 mg/dL    GFR MDRD Af Amer 13 (L) >60 mL/min/1.73m2    GFR MDRD Non Af Amer 10 (L) >60 mL/min/1.73m2   HM1 (CBC with Diff)   Result Value Ref Range    WBC 10.0 4.0 - 11.0 thou/uL    RBC 3.51 (L) 4.40 - 6.20 mill/uL    Hemoglobin 11.2 (L) 14.0 - 18.0 g/dL    Hematocrit 32.6 (L) 40.0 - 54.0 %    MCV 93 80 - 100 fL    MCH 31.9 27.0 - 34.0 pg    MCHC 34.4 32.0 - 36.0 g/dL    RDW 13.9 11.0 - 14.5 %    Platelets 209 140 - 440 thou/uL    MPV 9.3 8.5 - 12.5 fL    Neutrophils % 62 50 - 70 %    Lymphocytes % 23 20 - 40 %    Monocytes % 12 (H) 2 - 10 %    Eosinophils % 3 0 - 6 %    Basophils % 1 0 - 2 %    Neutrophils Absolute 6.1 2.0 - 7.7 thou/uL    Lymphocytes Absolute 2.3 0.8 - 4.4 thou/uL    Monocytes Absolute 1.2 (H) 0.0 - 0.9 thou/uL    Eosinophils Absolute 0.3 0.0 - 0.4 thou/uL    Basophils Absolute 0.1 0.0 - 0.2 thou/uL   POCT Glucose   Result Value Ref Range    Glucose,  mg/dL   POCT Glucose   Result Value Ref Range    Glucose,  mg/dL   POCT Glucose   Result Value Ref Range    Glucose,  mg/dL   INR   Result Value Ref Range    INR 1.89 (H) 0.90  - 1.10   Magnesium   Result Value Ref Range    Magnesium 1.8 1.8 - 2.6 mg/dL   Basic Metabolic Panel   Result Value Ref Range    Sodium 140 136 - 145 mmol/L    Potassium 3.8 3.5 - 5.0 mmol/L    Chloride 104 98 - 107 mmol/L    CO2 22 22 - 31 mmol/L    Anion Gap, Calculation 14 5 - 18 mmol/L    Glucose 155 (H) 70 - 125 mg/dL    Calcium 8.4 (L) 8.5 - 10.5 mg/dL    BUN 61 (H) 8 - 28 mg/dL    Creatinine 5.44 (H) 0.70 - 1.30 mg/dL    GFR MDRD Af Amer 12 (L) >60 mL/min/1.73m2    GFR MDRD Non Af Amer 10 (L) >60 mL/min/1.73m2   HM2(CBC w/o Differential)   Result Value Ref Range    WBC 10.8 4.0 - 11.0 thou/uL    RBC 3.57 (L) 4.40 - 6.20 mill/uL    Hemoglobin 11.5 (L) 14.0 - 18.0 g/dL    Hematocrit 32.9 (L) 40.0 - 54.0 %    MCV 92 80 - 100 fL    MCH 32.2 27.0 - 34.0 pg    MCHC 35.0 32.0 - 36.0 g/dL    RDW 13.3 11.0 - 14.5 %    Platelets 218 140 - 440 thou/uL    MPV 8.7 8.5 - 12.5 fL   POCT Glucose   Result Value Ref Range    Glucose,  mg/dL   Urinalysis-UC if Indicated   Result Value Ref Range    Color, UA Straw Colorless, Yellow, Straw, Light Yellow    Clarity, UA Clear Clear    Glucose,  mg/dL (!) Negative    Bilirubin, UA Negative Negative    Ketones, UA Negative Negative    Specific Gravity, UA 1.010 1.001 - 1.030    Blood, UA Small (!) Negative    pH, UA 6.0 4.5 - 8.0    Protein, UA 30 mg/dL (!) Negative mg/dL    Urobilinogen, UA <2.0 E.U./dL <2.0 E.U./dL, 2.0 E.U./dL    Nitrite, UA Negative Negative    Leukocytes, UA Negative Negative    Bacteria, UA None Seen None Seen hpf    RBC, UA 0-2 None Seen, 0-2 hpf    WBC, UA 0-5 None Seen, 0-5 hpf    Squam Epithel, UA 0-5 None Seen, 0-5 lpf    Hyaline Casts, UA 0-5 0-5, None Seen lpf   POCT Glucose   Result Value Ref Range    Glucose,  mg/dL   POCT Glucose   Result Value Ref Range    Glucose,  mg/dL   POCT Glucose   Result Value Ref Range    Glucose,  mg/dL   INR   Result Value Ref Range    INR 1.93 (H) 0.90 - 1.10   Magnesium   Result Value  Ref Range    Magnesium 1.7 (L) 1.8 - 2.6 mg/dL   Basic Metabolic Panel   Result Value Ref Range    Sodium 140 136 - 145 mmol/L    Potassium 4.3 3.5 - 5.0 mmol/L    Chloride 108 (H) 98 - 107 mmol/L    CO2 23 22 - 31 mmol/L    Anion Gap, Calculation 9 5 - 18 mmol/L    Glucose 151 (H) 70 - 125 mg/dL    Calcium 8.3 (L) 8.5 - 10.5 mg/dL    BUN 60 (H) 8 - 28 mg/dL    Creatinine 4.79 (H) 0.70 - 1.30 mg/dL    GFR MDRD Af Amer 14 (L) >60 mL/min/1.73m2    GFR MDRD Non Af Amer 12 (L) >60 mL/min/1.73m2   Hemoglobin   Result Value Ref Range    Hemoglobin 10.5 (L) 14.0 - 18.0 g/dL   POCT Glucose   Result Value Ref Range    Glucose,  mg/dL   POCT Glucose   Result Value Ref Range    Glucose,  mg/dL   POCT Glucose   Result Value Ref Range    Glucose,  mg/dL   POCT Glucose   Result Value Ref Range    Glucose,  mg/dL   INR   Result Value Ref Range    INR 1.80 (H) 0.90 - 1.10   Basic Metabolic Panel   Result Value Ref Range    Sodium 138 136 - 145 mmol/L    Potassium 3.6 3.5 - 5.0 mmol/L    Chloride 106 98 - 107 mmol/L    CO2 22 22 - 31 mmol/L    Anion Gap, Calculation 10 5 - 18 mmol/L    Glucose 162 (H) 70 - 125 mg/dL    Calcium 8.2 (L) 8.5 - 10.5 mg/dL    BUN 57 (H) 8 - 28 mg/dL    Creatinine 4.69 (H) 0.70 - 1.30 mg/dL    GFR MDRD Af Amer 15 (L) >60 mL/min/1.73m2    GFR MDRD Non Af Amer 12 (L) >60 mL/min/1.73m2   POCT Glucose   Result Value Ref Range    Glucose,  mg/dL   POCT Glucose   Result Value Ref Range    Glucose,  mg/dL     Xr Chest Pa And Lateral    Result Date: 10/21/2017  XR CHEST PA AND LATERAL 10/21/2017 6:16 PM INDICATION: chest pain COMPARISON: May 2, 2015 FINDINGS: Diffuse infiltrates seen in the prior study have resolved. The heart and pulmonary vasculature are normal the lungs are clear    Mr Brain Without Contrast    Result Date: 10/22/2017  [facility] HEAD MRI WITHOUT IV CONTRAST 10/22/2017 9:37 AM INDICATION: Abnormal gait TECHNIQUE: Head MRI without intravenous  contrast. COMPARISON: Head MRI 11/25/2016 FINDINGS: No acute infarct/restricted diffusion. No mass, hemorrhage or stroke. Advanced generalized volume loss. Advanced presumed chronic small vessel ischemic change. The pituitary gland is unremarkable. The major intracranial vascular flow voids are maintained. The calvarium and skull base are unremarkable.  Bilateral cataract resections. The paranasal sinuses are clear. The mastoid air cells are clear.     CONCLUSION: 1.  Stable exam 2.  No mass, hemorrhage or stroke. 3.  Advanced age-related change.     INR 1.6      Assessment/Plan:      Cognitive impairment with progressive confusion.  He was admitted with acute toxic encephalopathy.  Initial slums was 11/30  Neurology did see patient and an MRI was repeated but did not show any acute findings and it has been recommended he should be in the TCU for rehab.  He will be monitored for improvement in cognitive status but may need alternative placement living independently alone in his own home.  Acute renal insufficiency in the setting of chronic kidney disease stage IV.  Patient was seen by nephrology given IV hydration and his diuretics were held.  Renal function remains impaired though he did improve and he has been discharged on a low-dose of Lasix twice a day.  Close monitoring of renal function will be done in the TCU.  He needs to see nephrology as an outpatient and we will be monitoring his renal functions here.   in the past he has refused hemodialysis.  Congestive heart failure chronic with systolic dysfunction appear to be euvolemic in the hospital with no evidence of volume overload  His weights will be monitored in the TCU.  Currently discharged on a low-dose of Lasix twice a day  A. fib continues on his anticoagulation monitor INRs  Currently rate controlled on Coreg along with amiodarone  Hypertension on multiple medications including Coreg, hydralazine  Diabetes type 2 he was on oral glipizide which was  held due to renal impairment.  He has been discharged on insulin sliding scale.  Hyperlipidemia he is on simvastatin  Hypothyroidism on replacement Synthroid  BPH given Flomax  Anemia of chronic kidney disease given IV iron ×1. monitor hemoglobins  Gait instability with progressive weakness felt to be partly due to medication noncompliance cognitive decline as well as progressive renal impairment  Patient is very confused. he feels he is back to baseline continue to monitor mood and behaviors  Family is requesting a DNR/DNI status for him  He was scheduled to have follow-up labs today but none have been done as yet.  Will order recheck BMP and additional labs in 3 days and monitor him closely will put him on a fluid restriction and monitor weights closely also.  He is probably going to need alternative placement at the time of his discharge  Total time spent was 45 minutes, more than half of it was in face-to-face counseling regarding disease state, treatment, side effects, documentation, review of clinical data and coordination of care    Electronically signed by: CARA Carey  This progress note was completed using Dragon software and there may be grammatical errors.

## 2021-06-14 NOTE — PROGRESS NOTES
"Cardiology Progress Note    Assessment:  Dilated cardiomyopathy, likely nonischemic, improved systolic function- moderately depressed LV systolic function, functional class 1-2  Paroxysmal atrial fibrillation, no symptomatic episodes, on warfarin and amiodarone  Aortic stenosis, mild  Mitral regurgitation, functional , mild  Chronic kidney disease stage III   Cognitive impairment, dementia, progressive  CVA, no residual focal deficits      Plan:  I stressed importance of  compliance with cardiac medications.  No additional cardiac testing at this point.  Follow-up in 6 months      Subjective:   This is 77 y.o. male who comes in today for follow-up visit.  He suffers from progressive dementia.  He denies any symptoms.  His wife accompanies him today.  She states that he was able to walk 2 miles the other day.  He has not had any weight gain.  He denies heart palpitations or syncope    Review of Systems:   General: WNL  Eyes: WNL  Ears/Nose/Throat: WNL  Lungs: WNL  Heart: WNL  Stomach: WNL  Bladder: WNL  Muscle/Joints: WNL  Skin: WNL  Nervous System: WNL  Mental Health: WNL     Blood: WNL    Objective:   /74 (Patient Site: Right Arm, Patient Position: Sitting, Cuff Size: Adult Regular)  Pulse 70  Resp 16  Ht 5' 6\" (1.676 m)  Wt 178 lb (80.7 kg)  BMI 28.73 kg/m2  Physical Exam:  GENERAL: no distress  NECK: No JVD  LUNGS: Clear to auscultation.  CARDIAC: regular rhythm, S1 & S2 normal.  No heaves, thrills, gallops 2/6 soft ejection murmur at the aortic area  ABDOMEN: flat, negative hepatosplenomegaly, soft and non-tender.  EXTREMITIES: No evidence of cyanosis, clubbing or edema.    Current Outpatient Prescriptions   Medication Sig Dispense Refill     amiodarone (PACERONE) 200 MG tablet Take 1 tablet (200 mg total) by mouth every other day. 90 tablet 2     carvedilol (COREG) 25 MG tablet Take 25 mg by mouth 2 (two) times a day with meals.       docoshexanoic acid-eicosapent 500 mg (FISH OIL) 500-100 mg cap " capsule Take 1,000 mg by mouth daily.       furosemide (LASIX) 40 MG tablet Take 1 tablet (40 mg total) by mouth 2 (two) times a day at 9am and 6pm. (Patient taking differently: Take 40 mg by mouth daily. )  0     glipiZIDE (GLUCOTROL XL) 2.5 MG 24 hr tablet Take 1 tablet by mouth once daily before breakfast 90 tablet 0     hydrALAZINE (APRESOLINE) 10 MG tablet Take 20 mg by mouth 4 (four) times a day.       isosorbide mononitrate (IMDUR) 30 MG 24 hr tablet Take 2 tablets (60 mg total) by mouth daily. 180 tablet 1     levothyroxine (SYNTHROID, LEVOTHROID) 100 MCG tablet Take 100 mcg by mouth daily.       multivit-min-FA-lycopen-lutein (CENTRUM SILVER) 0.4-300-250 mg-mcg-mcg tablet Take 1 tablet by mouth daily.       potassium chloride (KLOR-CON) 10 MEQ CR tablet Take 1 tablet (10 mEq total) by mouth daily. (Patient taking differently: Take 20 mEq by mouth daily. ) 30 tablet 5     simvastatin (ZOCOR) 20 MG tablet Take 20 mg by mouth at bedtime.       tamsulosin (FLOMAX) 0.4 mg Cp24 Take 1 capsule (0.4 mg total) by mouth daily after supper.  0     triamcinolone (KENALOG) 0.5 % ointment Apply bid to affected area for 2-3 weeks (Patient taking differently: Apply 1 application topically 2 (two) times a day. Apply bid to affected area for 2-3 weeks) 30 g 2     warfarin (COUMADIN) 1 MG tablet Take 1.5 tablets (1.5 mg total) by mouth daily. Until next INR and then as directed for goal INR of 2-3 (Patient taking differently: Take 3 mg by mouth daily. INR 3.6 on coumadin 2mg daily, Recheck 11/16  INR 2.3 on coumadin 3.5mg daily. Recheck 11/6  Until next INR and then as directed for goal INR of 2-3)  0     insulin aspart (NOVOLOG) 100 unit/mL injection Inject 5 Units under the skin daily before lunch.       insulin glargine (LANTUS) 100 unit/mL injection Inject 16 Units under the skin every morning.        NOVOLOG 100 unit/mL injection Check blood sugar three (3) times daily.  11.65 Type 2 with hyperglycemia 10 mL PRN     No  current facility-administered medications for this visit.        Cardiographics:    Stress Test: May 2015   1. Lexiscan stress nuclear study is negative for inducible myocardial ischemia.  2. There is a medium size moderate intensity fixed defect in the inferior wall  representing an area of myocardial infarction.  3. Left ventricular size is normal. Left ventricular systolic function is moderate  to severely reduced with an LVEF of 34%.      Echo: May 2017  1. The left ventricle is normal in size. Left ventricular systolic performance is moderately reduced. The ejection fraction is estimated to be 40%.   2. There is mild to moderate global reduction in left ventricular systolic performance.   3. There is abnormal septal motion possibly related to altered electrical activation due to bundle branch block.  4. There is mild aortic stenosis.   5. Normal right ventricular size and systolic performance.   6. The left atrium is of normal size.      When compared to the prior real-time echocardiogram dated 4 May 2016, there has been an interval improvement in left ventricular systolic performance    Lab Results:       Lab Results   Component Value Date    CHOL 235 (H) 10/22/2017    CHOL 177 07/26/2017    CHOL 180 01/19/2017     Lab Results   Component Value Date    HDL 26 (L) 10/22/2017    HDL 29 (L) 07/26/2017    HDL 33 (L) 01/19/2017     Lab Results   Component Value Date    LDLCALC  10/22/2017      Comment:      Invalid, Triglycerides >400    LDLCALC  07/26/2017      Comment:      Invalid, Triglycerides >400    LDLCALC 89 01/19/2017     Lab Results   Component Value Date    TRIG 443 (H) 10/22/2017    TRIG 411 (H) 07/26/2017    TRIG 292 (H) 01/19/2017     No components found for: CHOLHDL  BNP   Date Value Ref Range Status   04/02/2017 129 (H) 0 - 76 pg/mL Final       Yonis (James)  MD Su

## 2021-06-14 NOTE — PROGRESS NOTES
"Chief Complaint   Patient presents with     Establish Care     INR       HPI: Patient recently discharged on November 26 from [facility].  He was hospitalized for confusion, diabetes and has past history of a CVA.  The patient became impatient and wished to leave.  Unfortunately, approximately 5 days ago he took off his alarm, left the nursing home and walked 2 miles and was [place] [address]  He was taken to the [facility] emergency department for evaluation.  Subsequently.  Nursing home people felt he was okay for discharge in his discharged him home with care of his wife.  His wife, Katerina, is very attentive, feel she can adequately care for him and monitor his activities closely.  The patient is able to reason and appears to understand the magnitude of his decisions.  He is adamantly denied hemodialysis or kidney care and refuses to take insulin.  His blood sugars remain elevated.    ROS: No hypoglycemia.  No syncopal episodes.    SH: The Patient's  reports that he quit smoking about 40 years ago. He has never used smokeless tobacco. He reports that he does not drink alcohol or use illicit drugs.     FH: The Patient's family history includes Heart disease in his mother; Mental illness in his father.    Meds:  Scout has a current medication list which includes the following prescription(s): amiodarone, carvedilol, docoshexanoic acid-eicosapent 500 mg, furosemide, hydralazine, insulin aspart, insulin glargine, isosorbide mononitrate, levothyroxine, multivit-min-fa-lycopen-lutein, novolog, potassium chloride, simvastatin, tamsulosin, triamcinolone, and warfarin.    O:  /84  Pulse 71  Resp 20  Ht 5' 6\" (1.676 m)  Wt 175 lb 9 oz (79.6 kg)  SpO2 98%  BMI 28.34 kg/m2     Lungs--Clear to Auscultation  Heart--Regular rate and rhythm  Abdomen--Soft, non-tender, non-distended  Skin-Pink and dry     A/P:   1.  Diabetes  -Patient adamantly refused insulin treatment or to check blood sugars " closely and he understands and made to this decision    2.  Renal failure  --Review of old records show he patient has a history of CKD the patient refuses further treatment    3.  Hypertension  -Blood pressure borderline normal but patient does not wish to have any further care    4.  Memory loss  -I discussed this in detail with the wife and she feels that she can handle him at home and is not overwhelmed at this point.  We discussed home care and social work and at this time they do not wish extra services.  We will monitor the patient from a distance and if he has any difficulties his wife Katerina agreed to bring him in.  30 minutes spent total

## 2021-06-14 NOTE — PROGRESS NOTES
DATE OF SERVICE: 12/18/2017    SUBJECTIVE:  Very pleasant 77-year-old gentleman who presents today for recheck.   His wife, Katerina, is in attendance and let me know that he is not taking has evening  carvedilol and hydralazine.  He also does not take insulin and refuses to take  insulin.    He is putting out urine and has a history of renal failure which has been stable and  he is seeing his urologist later this week.    His cardiac condition has been stable and his heart rate actually was very regular  today.  He continues on the amiodarone.  He has a cardiology appointment within the  next week as well.    REVIEW OF SYSTEMS:  No chest pain or shortness of breath.  Positive urine flow.    OBJECTIVE:  Blood pressure 140/88, pulse 80, respirations 16.  LUNGS:  Clear to auscultation.  HEART:  Had a regular rhythm today.  Ankles show no evidence of edema.  SKIN:  Pink and dry.    ASSESSMENT:  1.  Renal failure.  2.  Diabetes.  3.  Hypertension.  4.  Memory loss.    PLAN:  1.  Encouraged him to take carvedilol and hydralazine at evening time and we  compromised on  the time of actually taking the medicine.  2.  The patient is checking his blood sugars when the visiting nurse comes and they  are never higher than 140 so I am happy with him not taking the insulin at this  time.  3.  Continue simvastatin.  4.  Continue tamsulosin.  5.  Follow up with renal and cardiology.  6.  Will see back in 3 months.      LYNSEY WHITNEY MD  ca  D 12/18/2017 10:30:16  T 12/18/2017 10:47:56  R 12/18/2017 10:47:56  [id number]        cc:LYNSEY WHITNEY MD

## 2021-06-14 NOTE — PROGRESS NOTES
[facility] Patient Outreach:     Discharge Date::  11/28/17      Reason for TCU stay (discharge diagnosis)::  Acute toxic encephalopathy, cognitive impairment, CKD stage 4, CHF, DM, A-fib, anemia, weakness      Are you feeling better, the same or worse since your discharge?:  Patient is feeling better          As part of your discharge plan, did they discuss home care with you?: No            Did you receive any new medications, or was there a change to your medications?: No            Do you have any follow up visits scheduled with your PCP or Specialist?:  Yes, with PCP      (RN) Is it scheduled soon enough (3-5 days)?: Yes

## 2021-06-14 NOTE — PROGRESS NOTES
[facility]      Facility:    [facility] [[id number]]  Code Status: DNR/DNI      Chief Complaint/Reason for Visit:  Chief Complaint   Patient presents with     Review Of Multiple Medical Conditions       HPI:   Scout is a 77 y.o. male who is recent transfer from [facility].  He has a past medical history of congestive heart failure with systolic dysfunction, diabetes, chronic kidney disease stage IV now 5 and hypertension who lives in his own home.  He was brought to the ER with severe weakness and unsteady steady gait with confusion.  He has had progressive cognitive decline as there have been concern if he has been taking his medications as scheduled.  Initial workup was sepsis infection related which yielded unremarkable results.  His initial creatinine was 5.44.  He was taken off his Lasix and fluid resuscitated though without improvement of kidney function.  Apparently he is refused dialysis.  Per nephrology his baseline creatinine is 3-4.  He has been discharged to the TCU on low dose diuretics, furosemide 40 mg twice daily due to his systolic heart failure.  He is anticoagulated on Coumadin for underlying A. fib.  Also per his diabetes he was taken off his glipizide due to renal impairment and started on insulin NovoLog sliding scale.  Is also found to be anemic and given IM in the hospital.  He was also evaluated by neurology and imaging was inconclusive.  He is now transferred to the TCU in need of alternate long-term placement.    TCU:  Scout is a 77-year-old male with limited concerns.  His blood sugars are still elevated on sliding scale, previously increased Lantus to 16units in the a.m., will keep novolog 5U at noon and monitor.  He does have an issue about not not taking his insulin when given by non  caregivers, will continue monitor.  He is maintained on his fluid restriction of 1500mL and diuresis was decreased to furosemide 40mg  daily, though his weights are up to 176lb, will monitor.  His blood pressures are still elevated so previously increased his hydralazine to 20 mg 3 times daily, and this has been effective.  His any coagulation is therapeutic with Coumadin 2 mg daily with a recheck an INR on 11/27.  He denies any pain or any other issues for that matter.  He continues with therapy without any concern. His BIMS score on 11/2/17 was 4/15.    Patient denies pain, headache, chest pain, numbness or tingling, shortest of breath, eating or swallowing concerns, nausea or vomiting, diarrhea or bowel abnormalities, or no new integumentary concerns today. He does prefer female caretakers as there has been some behavior issues with male caretakers.      Past Medical History:  Past Medical History:   Diagnosis Date     Aortic stenosis 5/4/2015     Benign Essential Hypertension     Created by Conversion      Chronic Kidney Disease, Stage 3     Created by Conversion      Diabetes With Renal Manifestations     Created by Conversion      Essential Hypercholesterolemia     Created by Conversion      Type 2 Diabetes Mellitus     Created by Conversion      Vertigo     Created by Conversion            Surgical History:  Past Surgical History:   Procedure Laterality Date     CATARACT EXTRACTION         Family History:   Family History   Problem Relation Age of Onset     Heart disease Mother      Mental illness Father        Social History:    Social History     Social History     Marital status:      Spouse name: N/A     Number of children: N/A     Years of education: N/A     Social History Main Topics     Smoking status: Former Smoker     Quit date: 5/26/1977     Smokeless tobacco: Never Used     Alcohol use No     Drug use: No     Sexual activity: Not on file     Other Topics Concern     Not on file     Social History Narrative          Review of Systems   Constitutional: Positive for activity change and fatigue. Negative for appetite change,  diaphoresis and fever.        No concerns   HENT: Negative for congestion, facial swelling and hearing loss.    Eyes: Negative for photophobia and visual disturbance.   Respiratory: Negative for apnea, shortness of breath and wheezing.    Cardiovascular: Negative for chest pain.   Gastrointestinal: Negative for abdominal distention, abdominal pain, blood in stool, constipation and diarrhea.   Endocrine: Positive for polyuria.   Genitourinary: Positive for frequency. Negative for dysuria.   Skin:        Intact   Allergic/Immunologic: Negative.    Neurological: Negative for dizziness, speech difficulty, weakness and light-headedness.   Hematological: Negative.    Psychiatric/Behavioral: Positive for confusion. Negative for agitation, hallucinations and sleep disturbance. The patient is not nervous/anxious.        Vitals:    11/20/17 0834   BP: 158/82   Pulse: 66   Resp: 18   Temp: 98.5  F (36.9  C)   SpO2: 94%   Weight: 176 lb 3.2 oz (79.9 kg)       Physical Exam   Constitutional: He appears well-developed. No distress.   No acute concerns   HENT:   Head: Normocephalic and atraumatic.   Mouth/Throat: Oropharynx is clear and moist.   Eyes: Right eye exhibits no discharge. Left eye exhibits no discharge. No scleral icterus.   Neck: Normal range of motion. Neck supple. No JVD present.   Cardiovascular: Exam reveals no gallop and no friction rub.    Murmur heard.  Irregular rate and rhythm 2/6 CARMEN LSB   Pulmonary/Chest: Effort normal and breath sounds normal. No stridor. He has no wheezes. He has no rales.   CTA   Abdominal: Soft. Bowel sounds are normal. He exhibits no distension. There is no tenderness.   No constipation or diarrhea   Genitourinary:   Genitourinary Comments: Deferred   Musculoskeletal: He exhibits no edema, tenderness or deformity.   Ambulate per self   Neurological: He is alert.   Alert and oriented ×1   Skin: Skin is warm and dry. He is not diaphoretic.   Intact   Psychiatric: He has a normal mood and  affect.   No anxiety or depression, no behavioral issues       Medication List:  Current Outpatient Prescriptions   Medication Sig     amiodarone (PACERONE) 200 MG tablet Take 1 tablet (200 mg total) by mouth every other day.     carvedilol (COREG) 25 MG tablet Take 25 mg by mouth 2 (two) times a day with meals.     docoshexanoic acid-eicosapent 500 mg (FISH OIL) 500-100 mg cap capsule Take 1,000 mg by mouth daily.     furosemide (LASIX) 40 MG tablet Take 1 tablet (40 mg total) by mouth 2 (two) times a day at 9am and 6pm. (Patient taking differently: Take 40 mg by mouth daily. )     hydrALAZINE (APRESOLINE) 10 MG tablet Take 20 mg by mouth 3 (three) times a day.      insulin aspart (NOVOLOG) 100 unit/mL injection Inject 5 Units under the skin daily before lunch.     insulin glargine (LANTUS) 100 unit/mL injection Inject 16 Units under the skin every morning.      isosorbide mononitrate (IMDUR) 30 MG 24 hr tablet Take 2 tablets (60 mg total) by mouth daily.     levothyroxine (SYNTHROID, LEVOTHROID) 100 MCG tablet Take 100 mcg by mouth daily.     multivit-min-FA-lycopen-lutein (CENTRUM SILVER) 0.4-300-250 mg-mcg-mcg tablet Take 1 tablet by mouth daily.     NOVOLOG 100 unit/mL injection Check blood sugar three (3) times daily.  11.65 Type 2 with hyperglycemia     potassium chloride (KLOR-CON) 10 MEQ CR tablet Take 1 tablet (10 mEq total) by mouth daily. (Patient taking differently: Take 20 mEq by mouth daily. )     simvastatin (ZOCOR) 20 MG tablet Take 20 mg by mouth at bedtime.     tamsulosin (FLOMAX) 0.4 mg Cp24 Take 1 capsule (0.4 mg total) by mouth daily after supper.     triamcinolone (KENALOG) 0.5 % ointment Apply bid to affected area for 2-3 weeks (Patient taking differently: Apply 1 application topically 2 (two) times a day. Apply bid to affected area for 2-3 weeks)     warfarin (COUMADIN) 1 MG tablet Take 1.5 tablets (1.5 mg total) by mouth daily. Until next INR and then as directed for goal INR of 2-3  (Patient taking differently: Take 3 mg by mouth daily. INR 3.6 on coumadin 2mg daily, Recheck 11/16  INR 2.3 on coumadin 3.5mg daily. Recheck 11/6  Until next INR and then as directed for goal INR of 2-3)       Labs:  Recent Results (from the past 168 hour(s))   Basic Metabolic Panel    Collection Time: 11/15/17  5:00 AM   Result Value Ref Range    Sodium 138 136 - 145 mmol/L    Potassium 4.1 3.5 - 5.0 mmol/L    Chloride 105 98 - 107 mmol/L    CO2 25 22 - 31 mmol/L    Anion Gap, Calculation 8 5 - 18 mmol/L    Glucose 133 (H) 70 - 125 mg/dL    Calcium 8.8 8.5 - 10.5 mg/dL    BUN 36 (H) 8 - 28 mg/dL    Creatinine 4.16 (H) 0.70 - 1.30 mg/dL    GFR MDRD Af Amer 17 (L) >60 mL/min/1.73m2    GFR MDRD Non Af Amer 14 (L) >60 mL/min/1.73m2       Assessment:    ICD-10-CM    1. Diabetes type 2, controlled E11.9    2. Essential hypertension I10    3. Chronic systolic heart failure I50.22    4. Delirium R41.0    5. CKD (chronic kidney disease) stage 5, GFR less than 15 ml/min N18.5        Plan:  1.  Blood sugars 100s-210s, previously increased Lantus to 16 units in the a.m., maintain novolog 5U at noon, last A1c 7.1 on 10/22, has issues with certain caregivers that are not    2.  Increase hydralazine 20 mg 3 times daily, monitor  3.  Continue furosemide 40 mg daily, weight increased to 176lb, monitor  4.  INR 2.2, give coumadin 2mg with recheck on 11/27  5.  Last Cr 4.16 with GFR 14 on 11/15    The care plan has been reviewed and all orders signed. Changes to care plan, if any, as noted. Otherwise, continue care plan of care.      Electronically signed by: Reji Dougherty NP

## 2021-06-14 NOTE — PROGRESS NOTES
[facility]      Code Status:  DNR/DNI  Visit Type: Review Of Multiple Medical Conditions     Facility:  [facility] [[id number]]           History of Present Illness: Scout Batista is a 77 y.o. male who is currently admitted as a transfer from the hospital.  This is a 77-year-old who lives in his own home with underlying history of congestive heart failure with systolic dysfunction along with diabetes; chronic kidney disease stage IV and hypertension along with a previous history of CVA who was brought in to the emergency room with underlying history of severe weakness unsteady gait.  He also has had a progressive cognitive decline with confusion.  There is also some concern if he has been taking his medications as scheduled due to his progressive confusion and he was admitted in the hospital.  Initial workup for sepsis/infection including CBC BMP EG and x-rays were unremarkable.  He was noted to have chronic kidney disease stage IV with an admission creatinine of 5.44.  He was taken off his Lasix and fluids were given but no improvement in kidney function.  In the past he has refused dialysis.  Nephrology did see this patient and his renal function did improve with a creatinine between 3's and 4.  He has been discharged on a low-dose diuretic and close monitoring of his kidney functions has been recommended by them.  He also has underlying history of systolic heart failure and was felt to be euvolemic.  He continues to be on Coumadin for anticoagulation with underlying history of atrial fibrillation.  Diabetes control was adequate and he was taken off glipizide due to renal impairment with insulin sliding scale given.BG are elavated now he is on lantus.  He also is anemic and was given IM fe in the hospital.  Patient remained quite confused with initial slums of 11/30. BIMS 8/15.   His care was reviewed with his primary neurologist who recommended no change in his  medications they have recommended TCU .  MRI did not show any new changes it has been felt that with his progressive cognitive impairment he may need alternative placement  Remains confused but pleasant.  Labs show progressive worsening of renal function.  As per his wife present at bedside they did meet with the nephrologist and he began refused dialysis.  She is pushing for him to discharge back home understanding the risks.    Past Medical History:   Diagnosis Date     Aortic stenosis 5/4/2015     Benign Essential Hypertension     Created by Conversion      Chronic Kidney Disease, Stage 3     Created by Conversion      Diabetes With Renal Manifestations     Created by Conversion      Essential Hypercholesterolemia     Created by Conversion      Type 2 Diabetes Mellitus     Created by Conversion      Vertigo     Created by Conversion      Past Surgical History:   Procedure Laterality Date     CATARACT EXTRACTION       Family History   Problem Relation Age of Onset     Heart disease Mother      Mental illness Father      Social History     Social History     Marital status:      Spouse name: N/A     Number of children: N/A     Years of education: N/A     Occupational History     Not on file.     Social History Main Topics     Smoking status: Former Smoker     Quit date: 5/26/1977     Smokeless tobacco: Never Used     Alcohol use No     Drug use: No     Sexual activity: Not on file     Other Topics Concern     Not on file     Social History Narrative     Current Outpatient Prescriptions   Medication Sig Dispense Refill     amiodarone (PACERONE) 200 MG tablet Take 1 tablet (200 mg total) by mouth every other day. 90 tablet 2     carvedilol (COREG) 25 MG tablet Take 25 mg by mouth 2 (two) times a day with meals.       docoshexanoic acid-eicosapent 500 mg (FISH OIL) 500-100 mg cap capsule Take 1,000 mg by mouth daily.       furosemide (LASIX) 40 MG tablet Take 1 tablet (40 mg total) by mouth 2 (two) times a day  at 9am and 6pm. (Patient taking differently: Take 40 mg by mouth daily. )  0     hydrALAZINE (APRESOLINE) 10 MG tablet Take 20 mg by mouth 3 (three) times a day.        insulin aspart (NOVOLOG) 100 unit/mL injection Inject 5 Units under the skin daily before lunch.       insulin glargine (LANTUS) 100 unit/mL injection Inject 16 Units under the skin every morning.        isosorbide mononitrate (IMDUR) 30 MG 24 hr tablet Take 2 tablets (60 mg total) by mouth daily. 180 tablet 1     levothyroxine (SYNTHROID, LEVOTHROID) 100 MCG tablet Take 100 mcg by mouth daily.       multivit-min-FA-lycopen-lutein (CENTRUM SILVER) 0.4-300-250 mg-mcg-mcg tablet Take 1 tablet by mouth daily.       NOVOLOG 100 unit/mL injection Check blood sugar three (3) times daily.  11.65 Type 2 with hyperglycemia 10 mL PRN     potassium chloride (KLOR-CON) 10 MEQ CR tablet Take 1 tablet (10 mEq total) by mouth daily. (Patient taking differently: Take 20 mEq by mouth daily. ) 30 tablet 5     simvastatin (ZOCOR) 20 MG tablet Take 20 mg by mouth at bedtime.       tamsulosin (FLOMAX) 0.4 mg Cp24 Take 1 capsule (0.4 mg total) by mouth daily after supper.  0     triamcinolone (KENALOG) 0.5 % ointment Apply bid to affected area for 2-3 weeks (Patient taking differently: Apply 1 application topically 2 (two) times a day. Apply bid to affected area for 2-3 weeks) 30 g 2     warfarin (COUMADIN) 1 MG tablet Take 1.5 tablets (1.5 mg total) by mouth daily. Until next INR and then as directed for goal INR of 2-3 (Patient taking differently: Take 3 mg by mouth daily. INR 3.6 on coumadin 2mg daily, Recheck 11/16  INR 2.3 on coumadin 3.5mg daily. Recheck 11/6  Until next INR and then as directed for goal INR of 2-3)  0     No current facility-administered medications for this visit.      No Known Allergies      Review of Systems:    Constitutional: Negative.  Negative for fever, chills, has activity change, appetite change and fatigue.   HENT: Negative for  congestion and facial swelling.    Eyes: Negative for photophobia, redness and visual disturbance.   Respiratory: Negative for cough and chest tightness.    Cardiovascular: Negative for chest pain, palpitations and leg swelling.   Gastrointestinal: Negative for nausea, diarrhea, constipation, blood in stool and abdominal distention.   Genitourinary: Negative.    Musculoskeletal: Negative. Has had weakness and trouble walking   Skin: Negative.    Neurological: Negative for dizziness, tremors, syncope, weakness, light-headedness and headaches.   Hematological: Does not bruise/bleed easily.   Psychiatric/Behavioral: Negative.  more confused    Vitals:    11/22/17 1211   BP: 127/69   Pulse: 71   Temp: 98  F (36.7  C)       Physical Exam:    GENERAL: no acute distress. Cooperative in conversation.   HEENT: pupils are equal, round and reactive. Oral mucosa is moist and intact.  RESP:Chest symmetric. Regular respiratory rate. No stridor.  CVS: S1S2; has a murmer  ABD: Nondistended, soft.  EXTREMITIES: No lower extremity edema.   NEURO: non focal. Alert and oriented x3.   PSYCH: within normal limits. No depression or anxiety.  SKIN: warm dry intact       Labs:    Results for orders placed or performed in visit on 11/15/17   Basic Metabolic Panel   Result Value Ref Range    Sodium 138 136 - 145 mmol/L    Potassium 4.1 3.5 - 5.0 mmol/L    Chloride 105 98 - 107 mmol/L    CO2 25 22 - 31 mmol/L    Anion Gap, Calculation 8 5 - 18 mmol/L    Glucose 133 (H) 70 - 125 mg/dL    Calcium 8.8 8.5 - 10.5 mg/dL    BUN 36 (H) 8 - 28 mg/dL    Creatinine 4.16 (H) 0.70 - 1.30 mg/dL    GFR MDRD Af Amer 17 (L) >60 mL/min/1.73m2    GFR MDRD Non Af Amer 14 (L) >60 mL/min/1.73m2     INR 3.1      Assessment/Plan:      Cognitive impairment with progressive confusion.  He was admitted with acute toxic encephalopathy.  Initial slums was 15/30; MOCA 21/30 BIMS 8/15  Neurology did see patient and an MRI was repeated but did not show any acute findings  and it has been recommended he should be in the TCU for rehab.   Acute renal insufficiency in the setting of chronic kidney disease stage IV.  Patient was seen by nephrology given IV hydration and his diuretics were held.  Currently his Lasix has been cut down to once a day and his weights appear to be stable with no evidence of volume overload  Congestive heart failure chronic with systolic dysfunction appear to be euvolemic in the hospital with no evidence of volume overload  JAVY back continues on his anticoagulation monitor INRs  Currently rate controlled on Coreg along with amiodarone  Hypertension on multiple medications including Coreg, hydralazine  Diabetes type 2 he was on oral glipizide which was held due to renal impairment.  Currently on Lantus insulin sliding scale with blood sugar checks 4 times a day  Hyperlipidemia he is on simvastatin  Hypothyroidism on replacement Synthroid  BPH given Flomax  Anemia of chronic kidney disease given IV iron ×1. monitor hemoglobins  Gait instability with progressive weakness felt to be partly due to medication noncompliance cognitive decline as well as progressive renal impairment  Patient is very confused. he feels he is back to baseline continue to monitor mood and behaviors  Family is requesting a DNR/DNI status for him.  He is walking 150-250 feet with a front-wheeled walker .  He lives in an independent apartment but has his ex-wife managing his cares and also checking on him.    Discharge planning reviewed both with  as well as his wife present at the  bedside of the patient.  Family's pushing for discharge home next week to independent living apartment.  In my medical opinion he is not a candidate for independent living  And needs more supervision including medication management visit his wife is a diabetic and self administered insulin so she is comfortable doing it.  She lives separate from him but plans to visit at least twice a day to help with  his cares  We did talk about end-stage renal disease and they did follow with nephrology again hemodialysis was offered and patient was resolute and refusing dialysis  Care plan and reviews was reviewed with both of them and they are in agreement on going on hospice when his kidneys fail eventually.  My plan is to have them meet with hospice prior to discharging home sometime next week with increased services  Total time spent was 35 minutes, more than half of it was in face-to-face counseling regarding disease state, treatment, side effects, documentation, review of clinical data and coordination of care    Electronically signed by: CARA Carey  This progress note was completed using Dragon software and there may be grammatical errors.

## 2021-06-14 NOTE — PROGRESS NOTES
[facility]      Code Status:  DNR/DNI  Visit Type: Review Of Multiple Medical Conditions     Facility:  [facility] [[id number]]           History of Present Illness: Scout Batista is a 77 y.o. male who is currently admitted as a transfer from the hospital.  This is a 77-year-old who lives in his own home with underlying history of congestive heart failure with systolic dysfunction along with diabetes; chronic kidney disease stage IV and hypertension along with a previous history of CVA who was brought in to the emergency room with underlying history of severe weakness unsteady gait.  He also has had a progressive cognitive decline with confusion.  There is also some concern if he has been taking his medications as scheduled due to his progressive confusion and he was admitted in the hospital.  Initial workup for sepsis/infection including CBC BMP EG and x-rays were unremarkable.  He was noted to have chronic kidney disease stage IV with an admission creatinine of 5.44.  He was taken off his Lasix and fluids were given but no improvement in kidney function.  In the past he has refused dialysis.  Nephrology did see this patient and his renal function did improve with a creatinine between 3's and 4.  He has been discharged on a low-dose diuretic and close monitoring of his kidney functions has been recommended by them.  He also has underlying history of systolic heart failure and was felt to be euvolemic.  He continues to be on Coumadin for anticoagulation with underlying history of atrial fibrillation.  Diabetes control was adequate and he was taken off glipizide due to renal impairment with insulin sliding scale given.BG are elavated now he is on lantus.  He also is anemic and was given IM fe in the hospital.  Patient remained quite confused with initial slums of 11/30. BIMS 8/15.   His care was reviewed with his primary neurologist who recommended no change in his  medications they have recommended TCU .  MRI did not show any new changes it has been felt that with his progressive cognitive impairment he may need alternative placement  Remains confused but pleasant.  Labs show progressive worsening of renal function.  As per my discussion with his wife he has agreed to hospice cares upon discharge and does not want to pursue hemodialysis over the weekend patient had an elopement episode [place] from the   TCU.  He was brought into the emergency room and subsequently replaced in a locked memory care unit of the TCU, at this point family is again pushing for discharge back home when  We are recommending he go to an [facility] memory care unit.  His wife however is adamant and she wants to move him back to a independent living apartment with daily checks    Past Medical History:   Diagnosis Date     Aortic stenosis 5/4/2015     Benign Essential Hypertension     Created by Conversion      Chronic Kidney Disease, Stage 3     Created by Conversion      Diabetes With Renal Manifestations     Created by Conversion      Essential Hypercholesterolemia     Created by Conversion      Type 2 Diabetes Mellitus     Created by Conversion      Vertigo     Created by Conversion      Past Surgical History:   Procedure Laterality Date     CATARACT EXTRACTION       Family History   Problem Relation Age of Onset     Heart disease Mother      Mental illness Father      Social History     Social History     Marital status:      Spouse name: N/A     Number of children: N/A     Years of education: N/A     Occupational History     Not on file.     Social History Main Topics     Smoking status: Former Smoker     Quit date: 5/26/1977     Smokeless tobacco: Never Used     Alcohol use No     Drug use: No     Sexual activity: Not on file     Other Topics Concern     Not on file     Social History Narrative     Current Outpatient Prescriptions   Medication Sig  Dispense Refill     amiodarone (PACERONE) 200 MG tablet Take 1 tablet (200 mg total) by mouth every other day. 90 tablet 2     carvedilol (COREG) 25 MG tablet Take 25 mg by mouth 2 (two) times a day with meals.       docoshexanoic acid-eicosapent 500 mg (FISH OIL) 500-100 mg cap capsule Take 1,000 mg by mouth daily.       furosemide (LASIX) 40 MG tablet Take 1 tablet (40 mg total) by mouth 2 (two) times a day at 9am and 6pm. (Patient taking differently: Take 40 mg by mouth daily. )  0     hydrALAZINE (APRESOLINE) 10 MG tablet Take 20 mg by mouth 3 (three) times a day.        insulin aspart (NOVOLOG) 100 unit/mL injection Inject 5 Units under the skin daily before lunch.       insulin glargine (LANTUS) 100 unit/mL injection Inject 16 Units under the skin every morning.        isosorbide mononitrate (IMDUR) 30 MG 24 hr tablet Take 2 tablets (60 mg total) by mouth daily. 180 tablet 1     levothyroxine (SYNTHROID, LEVOTHROID) 100 MCG tablet Take 100 mcg by mouth daily.       multivit-min-FA-lycopen-lutein (CENTRUM SILVER) 0.4-300-250 mg-mcg-mcg tablet Take 1 tablet by mouth daily.       NOVOLOG 100 unit/mL injection Check blood sugar three (3) times daily.  11.65 Type 2 with hyperglycemia 10 mL PRN     potassium chloride (KLOR-CON) 10 MEQ CR tablet Take 1 tablet (10 mEq total) by mouth daily. (Patient taking differently: Take 20 mEq by mouth daily. ) 30 tablet 5     simvastatin (ZOCOR) 20 MG tablet Take 20 mg by mouth at bedtime.       tamsulosin (FLOMAX) 0.4 mg Cp24 Take 1 capsule (0.4 mg total) by mouth daily after supper.  0     triamcinolone (KENALOG) 0.5 % ointment Apply bid to affected area for 2-3 weeks (Patient taking differently: Apply 1 application topically 2 (two) times a day. Apply bid to affected area for 2-3 weeks) 30 g 2     warfarin (COUMADIN) 1 MG tablet Take 1.5 tablets (1.5 mg total) by mouth daily. Until next INR and then as directed for goal INR of 2-3 (Patient taking differently: Take 3 mg by  mouth daily. INR 3.6 on coumadin 2mg daily, Recheck 11/16  INR 2.3 on coumadin 3.5mg daily. Recheck 11/6  Until next INR and then as directed for goal INR of 2-3)  0     No current facility-administered medications for this visit.      No Known Allergies      Review of Systems:    Constitutional: Negative.  Negative for fever, chills, has activity change, appetite change and fatigue.   HENT: Negative for congestion and facial swelling.    Eyes: Negative for photophobia, redness and visual disturbance.   Respiratory: Negative for cough and chest tightness.    Cardiovascular: Negative for chest pain, palpitations and leg swelling.   Gastrointestinal: Negative for nausea, diarrhea, constipation, blood in stool and abdominal distention.   Genitourinary: Negative.    Musculoskeletal: Negative. Has had weakness and trouble walking   Skin: Negative.    Neurological: Negative for dizziness, tremors, syncope, weakness, light-headedness and headaches.   Hematological: Does not bruise/bleed easily.   Psychiatric/Behavioral: Negative.  more confused    Vitals:    11/27/17 1854   BP: 140/80   Pulse: 81   Temp: 98  F (36.7  C)       Physical Exam:    GENERAL: no acute distress. Cooperative in conversation.   HEENT: pupils are equal, round and reactive. Oral mucosa is moist and intact.  RESP:Chest symmetric. Regular respiratory rate. No stridor.  CVS: S1S2; has a murmer  ABD: Nondistended, soft.  EXTREMITIES: No lower extremity edema.   NEURO: non focal. Alert and oriented x3.   PSYCH: within normal limits. No depression or anxiety.  SKIN: warm dry intact       Labs:    Results for orders placed or performed during the hospital encounter of 11/26/17   Basic Metabolic Panel   Result Value Ref Range    Sodium 138 136 - 145 mmol/L    Potassium 4.4 3.5 - 5.0 mmol/L    Chloride 105 98 - 107 mmol/L    CO2 24 22 - 31 mmol/L    Anion Gap, Calculation 9 5 - 18 mmol/L    Glucose 145 (H) 70 - 125 mg/dL    Calcium 8.9 8.5 - 10.5 mg/dL    BUN  40 (H) 8 - 28 mg/dL    Creatinine 4.39 (H) 0.70 - 1.30 mg/dL    GFR MDRD Af Amer 16 (L) >60 mL/min/1.73m2    GFR MDRD Non Af Amer 13 (L) >60 mL/min/1.73m2         Assessment/Plan:      Cognitive impairment with progressive confusion.  He was admitted with acute toxic encephalopathy.  Initial slums was 15/30; MOCA 21/30 BIMS 8/15  Neurology did see patient and an MRI was repeated but did not show any acute findings and it has been recommended he should be in the TCU for rehab.   He is now independent with all his ADLs but remains confused  Acute renal insufficiency in the setting of chronic kidney disease stage IV.  Patient was seen by nephrology given IV hydration and his diuretics were held.  Currently his Lasix has been cut down to once a day and his weights appear to be stable with no evidence of volume overload  Congestive heart failure chronic with systolic dysfunction appear to be euvolemic in the hospital with no evidence of volume overload  JAVY wong continues on his anticoagulation monitor INRs  Currently rate controlled on Coreg along with amiodarone  Hypertension on multiple medications including Coreg, hydralazine  Diabetes type 2 he was on oral glipizide which was held due to renal impairment.  Currently on Lantus insulin sliding scale with blood sugar checks 4 times a day  Hyperlipidemia he is on simvastatin  Hypothyroidism on replacement Synthroid  BPH given Flomax  Anemia of chronic kidney disease given IV iron ×1. monitor hemoglobins  Gait instability with progressive weakness felt to be partly due to medication noncompliance cognitive decline as well as progressive renal impairment  Patient is very confused. he feels he is back to baseline continue to monitor mood and behaviors  Family is requesting a DNR/DNI status for him.  Elopement episode with patient ending up in the emergency room after he was found walking down the street about 1 mile away from the TCU.  He was then brought into the emergency  room subsequently was placed back in a locked memory care unit.  His wife has again been contacted about discharge planning and I did have a discussion with the  about his upcoming discharge family is adamant that he should go back to his independent living apartment his wife also believes that he may need hospice sooner than later once he moves back.  As there is a good likelihood that he may refuse treatment as well as cares and his insulin and other cares once he goes back  I have updated the  that if family persistent on we need to notify vulnerable adults upon his discharge pursuing unsafe discharge plan at present we are recommending he moved to a assisted living/memory care unit    Total time spent was 35 minutes, more than half of it was in face-to-face counseling regarding disease state, treatment, side effects, documentation, review of clinical data and coordination of care    Electronically signed by: CARA Carey  This progress note was completed using Dragon software and there may be grammatical errors.

## 2021-06-14 NOTE — PROGRESS NOTES
[facility]      Code Status:  DNR/DNI  Visit Type: Review Of Multiple Medical Conditions     Facility:  [facility] [[id number]]           History of Present Illness: Scout Batista is a 77 y.o. male who is currently admitted as a transfer from the hospital.  This is a 77-year-old who lives in his own home with underlying history of congestive heart failure with systolic dysfunction along with diabetes; chronic kidney disease stage IV and hypertension along with a previous history of CVA who was brought in to the emergency room with underlying history of severe weakness unsteady gait.  He also has had a progressive cognitive decline with confusion.  There is also some concern if he has been taking his medications as scheduled due to his progressive confusion and he was admitted in the hospital.  Initial workup for sepsis/infection including CBC BMP EG and x-rays were unremarkable.  He was noted to have chronic kidney disease stage IV with an admission creatinine of 5.44.  He was taken off his Lasix and fluids were given but no improvement in kidney function.  In the past he has refused dialysis.  Nephrology did see this patient and his renal function did improve with a creatinine between 3's and 4.  He has been discharged on a low-dose diuretic and close monitoring of his kidney functions has been recommended by them.  He also has underlying history of systolic heart failure and was felt to be euvolemic.  He continues to be on Coumadin for anticoagulation with underlying history of atrial fibrillation.  Diabetes control was adequate and he was taken off glipizide due to renal impairment with insulin sliding scale given.BG are elavated now he is on lantus.  He also is anemic and was given IM fe in the hospital.  Patient remained quite confused with initial slums of 11/30.  His care was reviewed with his primary neurologist who recommended no change in his medications they  have recommended TCU .  MRI did not show any new changes it has been felt that with his progressive cognitive impairment he may need alternative placement  Remains confused but pleasant.  Labs show progressive worsening of renal function    Past Medical History:   Diagnosis Date     Aortic stenosis 5/4/2015     Benign Essential Hypertension     Created by Conversion      Chronic Kidney Disease, Stage 3     Created by Conversion      Diabetes With Renal Manifestations     Created by Conversion      Essential Hypercholesterolemia     Created by Conversion      Type 2 Diabetes Mellitus     Created by Conversion      Vertigo     Created by Conversion      Past Surgical History:   Procedure Laterality Date     CATARACT EXTRACTION       Family History   Problem Relation Age of Onset     Heart disease Mother      Mental illness Father      Social History     Social History     Marital status:      Spouse name: N/A     Number of children: N/A     Years of education: N/A     Occupational History     Not on file.     Social History Main Topics     Smoking status: Former Smoker     Quit date: 5/26/1977     Smokeless tobacco: Never Used     Alcohol use No     Drug use: No     Sexual activity: Not on file     Other Topics Concern     Not on file     Social History Narrative     Current Outpatient Prescriptions   Medication Sig Dispense Refill     amiodarone (PACERONE) 200 MG tablet Take 1 tablet (200 mg total) by mouth every other day. 90 tablet 2     carvedilol (COREG) 25 MG tablet Take 25 mg by mouth 2 (two) times a day with meals.       docoshexanoic acid-eicosapent 500 mg (FISH OIL) 500-100 mg cap capsule Take 1,000 mg by mouth daily.       furosemide (LASIX) 40 MG tablet Take 1 tablet (40 mg total) by mouth 2 (two) times a day at 9am and 6pm.  0     hydrALAZINE (APRESOLINE) 10 MG tablet Take 20 mg by mouth 3 (three) times a day.        insulin glargine (LANTUS) 100 unit/mL injection Inject 8 Units under the skin  every morning.        isosorbide mononitrate (IMDUR) 30 MG 24 hr tablet Take 2 tablets (60 mg total) by mouth daily. 180 tablet 1     levothyroxine (SYNTHROID, LEVOTHROID) 100 MCG tablet Take 100 mcg by mouth daily.       multivit-min-FA-lycopen-lutein (CENTRUM SILVER) 0.4-300-250 mg-mcg-mcg tablet Take 1 tablet by mouth daily.       NOVOLOG 100 unit/mL injection Check blood sugar three (3) times daily.  11.65 Type 2 with hyperglycemia 10 mL PRN     potassium chloride (KLOR-CON) 10 MEQ CR tablet Take 1 tablet (10 mEq total) by mouth daily. 30 tablet 5     simvastatin (ZOCOR) 20 MG tablet Take 20 mg by mouth at bedtime.       tamsulosin (FLOMAX) 0.4 mg Cp24 Take 1 capsule (0.4 mg total) by mouth daily after supper.  0     triamcinolone (KENALOG) 0.5 % ointment Apply bid to affected area for 2-3 weeks (Patient taking differently: Apply 1 application topically 2 (two) times a day. Apply bid to affected area for 2-3 weeks) 30 g 2     warfarin (COUMADIN) 1 MG tablet Take 1.5 tablets (1.5 mg total) by mouth daily. Until next INR and then as directed for goal INR of 2-3 (Patient taking differently: Take 3 mg by mouth daily. INR 2.3 on coumadin 3.5mg daily. Recheck 11/6  Until next INR and then as directed for goal INR of 2-3)  0     No current facility-administered medications for this visit.      No Known Allergies      Review of Systems:    Constitutional: Negative.  Negative for fever, chills, has activity change, appetite change and fatigue.   HENT: Negative for congestion and facial swelling.    Eyes: Negative for photophobia, redness and visual disturbance.   Respiratory: Negative for cough and chest tightness.    Cardiovascular: Negative for chest pain, palpitations and leg swelling.   Gastrointestinal: Negative for nausea, diarrhea, constipation, blood in stool and abdominal distention.   Genitourinary: Negative.    Musculoskeletal: Negative. Has had weakness and trouble walking   Skin: Negative.    Neurological:  Negative for dizziness, tremors, syncope, weakness, light-headedness and headaches.   Hematological: Does not bruise/bleed easily.   Psychiatric/Behavioral: Negative.  more confused    Vitals:    11/08/17 1140   BP: 160/80   Pulse: 65   Temp: 98  F (36.7  C)       Physical Exam:    GENERAL: no acute distress. Cooperative in conversation.   HEENT: pupils are equal, round and reactive. Oral mucosa is moist and intact.  RESP:Chest symmetric. Regular respiratory rate. No stridor.  CVS: S1S2; has a murmer  ABD: Nondistended, soft.  EXTREMITIES: No lower extremity edema.   NEURO: non focal. Alert and oriented x3.   PSYCH: within normal limits. No depression or anxiety.  SKIN: warm dry intact       Labs:    Results for orders placed or performed in visit on 11/06/17   Basic Metabolic Panel   Result Value Ref Range    Sodium 134 (L) 136 - 145 mmol/L    Potassium 3.4 (L) 3.5 - 5.0 mmol/L    Chloride 97 (L) 98 - 107 mmol/L    CO2 24 22 - 31 mmol/L    Anion Gap, Calculation 13 5 - 18 mmol/L    Glucose 181 (H) 70 - 125 mg/dL    Calcium 9.1 8.5 - 10.5 mg/dL    BUN 63 (H) 8 - 28 mg/dL    Creatinine 5.64 (H) 0.70 - 1.30 mg/dL    GFR MDRD Af Amer 12 (L) >60 mL/min/1.73m2    GFR MDRD Non Af Amer 10 (L) >60 mL/min/1.73m2     INR 3.1      Assessment/Plan:      Cognitive impairment with progressive confusion.  He was admitted with acute toxic encephalopathy.  Initial slums was 15/30; MOCA 21/30  Neurology did see patient and an MRI was repeated but did not show any acute findings and it has been recommended he should be in the TCU for rehab.    He will be monitored for improvement in cognitive status but may need alternative placement living independently alone in his own home. None noted so far.  His EX wife has been providing him the support and checking on him and managing his medication so far.  Acute renal insufficiency in the setting of chronic kidney disease stage IV.  Patient was seen by nephrology given IV hydration and his  diuretics were held.  Renal function remains impaired though he did improve and he has been discharged on a low-dose of Lasix twice a day.  Due to continued impairment in renal function with elevated BUN I am going to decrease his Lasix to once a day and continue to monitor weights as well as BMPs closely.    Congestive heart failure chronic with systolic dysfunction appear to be euvolemic in the hospital with no evidence of volume overload  His weights are down by about 9 pounds.  I am decreasing his Lasix to once a day dose of 40mg  A. fib continues on his anticoagulation monitor INRs  Currently rate controlled on Coreg along with amiodarone  Hypertension on multiple medications including Coreg, hydralazine  Diabetes type 2 he was on oral glipizide which was held due to renal impairment.  He has been discharged on insulin sliding scale.  Blood sugars are elevated.   Has been initiated on Lantus 8 units plan is to increase it to 12 units.  We will also schedule NovoLog 5 units with lunch.  Advised staff to monitor his intakes as well as to make sure that he has a at bedtime snack as he is high risk of hypoglycemia also  Hyperlipidemia he is on simvastatin  Hypothyroidism on replacement Synthroid  BPH given Flomax  Anemia of chronic kidney disease given IV iron ×1. monitor hemoglobins  Gait instability with progressive weakness felt to be partly due to medication noncompliance cognitive decline as well as progressive renal impairment  Patient is very confused. he feels he is back to baseline continue to monitor mood and behaviors  Family is requesting a DNR/DNI status for him.  He is walking 150-250 feet with a front-wheeled walker .  He lives in an independent apartment but has his ex-wife managing his cares and also checking on him.  He has a nephrology appointment on 11/9 and family hopes to discharge him back to his previous living arrangement if possible therapy is recommending he moved to an assisted living  facility.  Monitor labs and blood sugars  Total time spent was 35 minutes, more than half of it was in face-to-face counseling regarding disease state, treatment, side effects, documentation, review of clinical data and coordination of care    Electronically signed by: CARA Carey  This progress note was completed using Dragon software and there may be grammatical errors.

## 2021-06-14 NOTE — PROGRESS NOTES
[facility]      Facility:    [facility] [[id number]]  Code Status: DNR/DNI      Chief Complaint/Reason for Visit:  Chief Complaint   Patient presents with     Review Of Multiple Medical Conditions       HPI:   Scout is a 77 y.o. male who is recent transfer from [facility].  He has a past medical history of congestive heart failure with systolic dysfunction, diabetes, chronic kidney disease stage IV now 5 and hypertension who lives in his own home.  He was brought to the ER with severe weakness and unsteady steady gait with confusion.  He has had progressive cognitive decline as there have been concern if he has been taking his medications as scheduled.  Initial workup was sepsis infection related which yielded unremarkable results.  His initial creatinine was 5.44.  He was taken off his Lasix and fluid resuscitated though without improvement of kidney function.  Apparently he is refused dialysis.  Per nephrology his baseline creatinine is 3-4.  He has been discharged to the TCU on low dose diuretics, furosemide 40 mg twice daily due to his systolic heart failure.  He is anticoagulated on Coumadin for underlying A. fib.  Also per his diabetes he was taken off his glipizide due to renal impairment and started on insulin NovoLog sliding scale.  Is also found to be anemic and given IM in the hospital.  He was also evaluated by neurology and imaging was inconclusive.  He is now transferred to the TCU in need of alternate long-term placement.    TCU:  Scout is a 77-year-old male with limited concerns.  His blood sugars are still elevated between 140s-200s on sliding scale, so increase Lantus to 16units in the a.m., will keep novolog 5U at noon and monitor.  He is maintained on his fluid restriction of 1500mL and diuresis was decreased to furosemide 40mg daily, though his weights are up to 174lb, will monitor.  His blood pressures are still elevated so previously  increased his hydralazine to 20 mg 3 times daily, and this has been effective.  His any coagulation is therapeutic with Coumadin 2 mg daily with a recheck an INR on 11/16.  He denies any pain or any other issues for that matter.  He continues with therapy without any concern.    Patient denies pain, headache, chest pain, numbness or tingling, shortest of breath, eating or swallowing concerns, nausea or vomiting, diarrhea or bowel abnormalities, or no new integumentary concerns today. He does prefer female caretakers as there has been some behavior issues with male caretakers.      Past Medical History:  Past Medical History:   Diagnosis Date     Aortic stenosis 5/4/2015     Benign Essential Hypertension     Created by Conversion      Chronic Kidney Disease, Stage 3     Created by Conversion      Diabetes With Renal Manifestations     Created by Conversion      Essential Hypercholesterolemia     Created by Conversion      Type 2 Diabetes Mellitus     Created by Conversion      Vertigo     Created by Conversion            Surgical History:  Past Surgical History:   Procedure Laterality Date     CATARACT EXTRACTION         Family History:   Family History   Problem Relation Age of Onset     Heart disease Mother      Mental illness Father        Social History:    Social History     Social History     Marital status:      Spouse name: N/A     Number of children: N/A     Years of education: N/A     Social History Main Topics     Smoking status: Former Smoker     Quit date: 5/26/1977     Smokeless tobacco: Never Used     Alcohol use No     Drug use: No     Sexual activity: Not on file     Other Topics Concern     Not on file     Social History Narrative          Review of Systems   Constitutional: Positive for activity change and fatigue. Negative for appetite change, diaphoresis and fever.        No concerns   HENT: Negative for congestion, facial swelling and hearing loss.    Eyes: Negative for photophobia and  visual disturbance.   Respiratory: Negative for apnea, shortness of breath and wheezing.    Cardiovascular: Negative for chest pain.   Gastrointestinal: Negative for abdominal distention, abdominal pain, blood in stool, constipation and diarrhea.   Endocrine: Positive for polyuria.   Genitourinary: Positive for frequency. Negative for dysuria.   Skin:        Intact   Allergic/Immunologic: Negative.    Neurological: Negative for dizziness, speech difficulty, weakness and light-headedness.   Hematological: Negative.    Psychiatric/Behavioral: Positive for confusion. Negative for agitation, hallucinations and sleep disturbance. The patient is not nervous/anxious.        Vitals:    11/13/17 0913   BP: 152/80   Pulse: 66   Resp: 18   Temp: 98.4  F (36.9  C)   SpO2: 97%   Weight: 174 lb 3.2 oz (79 kg)       Physical Exam   Constitutional: He appears well-developed. No distress.   No acute concerns   HENT:   Head: Normocephalic and atraumatic.   Mouth/Throat: Oropharynx is clear and moist.   Eyes: Right eye exhibits no discharge. Left eye exhibits no discharge. No scleral icterus.   Neck: Normal range of motion. Neck supple. No JVD present.   Cardiovascular: Exam reveals no gallop and no friction rub.    Murmur heard.  Irregular rate and rhythm 2/6 CARMEN LSB   Pulmonary/Chest: Effort normal and breath sounds normal. No stridor. He has no wheezes. He has no rales.   CTA   Abdominal: Soft. Bowel sounds are normal. He exhibits no distension. There is no tenderness.   No constipation or diarrhea   Genitourinary:   Genitourinary Comments: Deferred   Musculoskeletal: He exhibits no edema, tenderness or deformity.   Ambulate per self   Neurological: He is alert.   Alert and oriented ×1   Skin: Skin is warm and dry. He is not diaphoretic.   Intact   Psychiatric: He has a normal mood and affect.   No anxiety or depression, no behavioral issues       Medication List:  Current Outpatient Prescriptions   Medication Sig     insulin  aspart (NOVOLOG) 100 unit/mL injection Inject 5 Units under the skin daily before lunch.     amiodarone (PACERONE) 200 MG tablet Take 1 tablet (200 mg total) by mouth every other day.     carvedilol (COREG) 25 MG tablet Take 25 mg by mouth 2 (two) times a day with meals.     docoshexanoic acid-eicosapent 500 mg (FISH OIL) 500-100 mg cap capsule Take 1,000 mg by mouth daily.     furosemide (LASIX) 40 MG tablet Take 1 tablet (40 mg total) by mouth 2 (two) times a day at 9am and 6pm. (Patient taking differently: Take 40 mg by mouth daily. )     hydrALAZINE (APRESOLINE) 10 MG tablet Take 20 mg by mouth 3 (three) times a day.      insulin glargine (LANTUS) 100 unit/mL injection Inject 16 Units under the skin every morning.      isosorbide mononitrate (IMDUR) 30 MG 24 hr tablet Take 2 tablets (60 mg total) by mouth daily.     levothyroxine (SYNTHROID, LEVOTHROID) 100 MCG tablet Take 100 mcg by mouth daily.     multivit-min-FA-lycopen-lutein (CENTRUM SILVER) 0.4-300-250 mg-mcg-mcg tablet Take 1 tablet by mouth daily.     NOVOLOG 100 unit/mL injection Check blood sugar three (3) times daily.  11.65 Type 2 with hyperglycemia     potassium chloride (KLOR-CON) 10 MEQ CR tablet Take 1 tablet (10 mEq total) by mouth daily. (Patient taking differently: Take 20 mEq by mouth daily. )     simvastatin (ZOCOR) 20 MG tablet Take 20 mg by mouth at bedtime.     tamsulosin (FLOMAX) 0.4 mg Cp24 Take 1 capsule (0.4 mg total) by mouth daily after supper.     triamcinolone (KENALOG) 0.5 % ointment Apply bid to affected area for 2-3 weeks (Patient taking differently: Apply 1 application topically 2 (two) times a day. Apply bid to affected area for 2-3 weeks)     warfarin (COUMADIN) 1 MG tablet Take 1.5 tablets (1.5 mg total) by mouth daily. Until next INR and then as directed for goal INR of 2-3 (Patient taking differently: Take 3 mg by mouth daily. INR 3.6 on coumadin 2mg daily, Recheck 11/16  INR 2.3 on coumadin 3.5mg daily. Recheck  11/6  Until next INR and then as directed for goal INR of 2-3)       Labs:  Recent Results (from the past 168 hour(s))   Basic Metabolic Panel    Collection Time: 11/06/17 11:46 AM   Result Value Ref Range    Sodium 134 (L) 136 - 145 mmol/L    Potassium 3.4 (L) 3.5 - 5.0 mmol/L    Chloride 97 (L) 98 - 107 mmol/L    CO2 24 22 - 31 mmol/L    Anion Gap, Calculation 13 5 - 18 mmol/L    Glucose 181 (H) 70 - 125 mg/dL    Calcium 9.1 8.5 - 10.5 mg/dL    BUN 63 (H) 8 - 28 mg/dL    Creatinine 5.64 (H) 0.70 - 1.30 mg/dL    GFR MDRD Af Amer 12 (L) >60 mL/min/1.73m2    GFR MDRD Non Af Amer 10 (L) >60 mL/min/1.73m2       Assessment:    ICD-10-CM    1. Diabetes type 2, controlled E11.9    2. Benign Essential Hypertension I10    3. Chronic systolic heart failure I50.22    4. Delirium R41.0    5. CKD (chronic kidney disease) stage 5, GFR less than 15 ml/min N18.5        Plan:  1.  Blood sugars 140s-230s, increase Lantus to 16 units in the a.m., maintain novolog 5U at noon, last A1c 7.1 on 10/22  2.  Increase hydralazine 20 mg 3 times daily, monitor  3.  Continue furosemide 40 mg daily, weight increased to 174lb, monitor  4.  INR 3.6, give coumadin 2mg with recheck on 11/16  5.  Last Cr 5.64 with GFR 10 on 11/6, another BMP on 11/15    The care plan has been reviewed and all orders signed. Changes to care plan, if any, as noted. Otherwise, continue care plan of care.      Electronically signed by: Reji Dougherty NP

## 2021-06-15 NOTE — PROGRESS NOTES
"[facility]      Facility:    [facility] [[id number]]  Code Status: DNR/DNI      Chief Complaint/Reason for Visit:  Chief Complaint   Patient presents with     Problem Visit       HPI:   Scout is a 77 y.o. male who is recent transfer from [facility].  He has a past medical history of congestive heart failure with systolic dysfunction, diabetes, chronic kidney disease stage IV now 5 and hypertension who lives in his own home who was discharged from this TCU in Nov 2017.  He was brought to the ER by police with severe confusion on 2/4/18 and was discharged on 2/7/18 to the TCU.  Per wife he has been noncompliant with medications and has several violent outbursts, though nothing physical that is known. Historically his has ESRD and has refused dialysis.  Per nephrology his baseline creatinine is 3-4, with a level on 4.22 on admission.  On exam today he is cooperative though states that \"if you push, I will push harder.\"  I explain to him that he has the right to refuse treatment and that we would like to help him. Per our discussion will discontinue his insulin and attempt to maintain some control of BS with oral only.  Further he does not want any blood draws so INRs will be maintained in house. He states sleeping well so will discontinue trazodone and monitor sleep.     Patient denies pain, headache, chest pain, numbness or tingling, shortest of breath, eating or swallowing concerns, nausea or vomiting, diarrhea or bowel abnormalities, or no new integumentary concerns today. He does prefer female caretakers as there has been some behavior issues with male caretakers in the past.      Past Medical History:  Past Medical History:   Diagnosis Date     Aortic stenosis 5/4/2015     Benign Essential Hypertension     Created by Conversion      Chronic Kidney Disease, Stage 3     Created by Conversion      Diabetes With Renal Manifestations     Created by Conversion  "     Essential Hypercholesterolemia     Created by Conversion      Type 2 Diabetes Mellitus     Created by Conversion      Vertigo     Created by Conversion            Surgical History:  Past Surgical History:   Procedure Laterality Date     CATARACT EXTRACTION         Family History:   Family History   Problem Relation Age of Onset     Heart disease Mother      Mental illness Father        Social History:    Social History     Social History     Marital status:      Spouse name: N/A     Number of children: N/A     Years of education: N/A     Social History Main Topics     Smoking status: Former Smoker     Quit date: 5/26/1977     Smokeless tobacco: Never Used     Alcohol use No     Drug use: No     Sexual activity: Not on file     Other Topics Concern     Not on file     Social History Narrative          Review of Systems   Constitutional: Positive for activity change and fatigue. Negative for appetite change, diaphoresis and fever.        No concerns   HENT: Negative for congestion, facial swelling and hearing loss.    Eyes: Negative for photophobia and visual disturbance.   Respiratory: Negative for apnea, shortness of breath and wheezing.    Cardiovascular: Negative for chest pain.   Gastrointestinal: Negative for abdominal distention, abdominal pain, blood in stool, constipation and diarrhea.   Endocrine: Positive for polyuria.   Genitourinary: Positive for frequency. Negative for dysuria.   Skin:        Intact   Allergic/Immunologic: Negative.    Neurological: Negative for dizziness, speech difficulty, weakness and light-headedness.   Hematological: Negative.    Psychiatric/Behavioral: Positive for confusion. Negative for agitation, hallucinations and sleep disturbance. The patient is not nervous/anxious.        Vitals:    02/09/18 1112   BP: 158/87   Pulse: 79   Resp: 18   Temp: 97.5  F (36.4  C)   SpO2: 96%   Weight: 175 lb (79.4 kg)       Physical Exam   Constitutional: He appears well-developed. No  distress.   Refusing insulin   HENT:   Head: Normocephalic and atraumatic.   Mouth/Throat: Oropharynx is clear and moist. No oropharyngeal exudate.   Eyes: Pupils are equal, round, and reactive to light. Right eye exhibits no discharge. Left eye exhibits no discharge. No scleral icterus.   Neck: Normal range of motion. Neck supple. No JVD present.   Cardiovascular: Exam reveals no gallop and no friction rub.    Murmur heard.  Irregular rate and rhythm 2/6 CARMEN LSB   Pulmonary/Chest: Effort normal and breath sounds normal. No stridor. He has no wheezes. He has no rales.   Bibasilar crackles, RA   Abdominal: Soft. Bowel sounds are normal. He exhibits no distension. There is no tenderness.   No constipation or diarrhea   Genitourinary:   Genitourinary Comments: Deferred   Musculoskeletal: He exhibits no edema, tenderness or deformity.   Ambulate per self   Neurological: He is alert. No cranial nerve deficit.   Alert and oriented ×1, no focal deficits   Skin: Skin is warm and dry. He is not diaphoretic.   Intact   Psychiatric: He has a normal mood and affect.   No anxiety or depression, no behavioral issues, stubbord   Vitals reviewed.      Medication List:  Current Outpatient Prescriptions   Medication Sig     acetaminophen (TYLENOL) 500 MG tablet Take 1-2 tablets (500-1,000 mg total) by mouth every 6 (six) hours as needed.     amiodarone (PACERONE) 200 MG tablet Take 1 tablet (200 mg total) by mouth every other day.     carvedilol (COREG) 25 MG tablet Take 25 mg by mouth 2 (two) times a day with meals.     docoshexanoic acid-eicosapent 500 mg (FISH OIL) 500-100 mg cap capsule Take 1,000 mg by mouth daily.     furosemide (LASIX) 40 MG tablet Take 1 tablet (40 mg total) by mouth 2 (two) times a day at 9am and 6pm. (Patient taking differently: Take 40 mg by mouth daily. )     glipiZIDE (GLUCOTROL XL) 2.5 MG 24 hr tablet Take 1 tablet by mouth once daily before breakfast     hydrALAZINE (APRESOLINE) 10 MG tablet Take 1  tablet (10 mg total) by mouth 4 (four) times a day.     isosorbide mononitrate (IMDUR) 30 MG 24 hr tablet Take 30 mg by mouth daily.     levothyroxine (SYNTHROID, LEVOTHROID) 100 MCG tablet Take 100 mcg by mouth daily.     magnesium oxide (MAG-OX) 400 mg tablet Take 1 tablet (400 mg total) by mouth 2 (two) times a day.     multivit-min-FA-lycopen-lutein (CENTRUM SILVER) 0.4-300-250 mg-mcg-mcg tablet Take 1 tablet by mouth daily.     potassium chloride (KLOR-CON) 10 MEQ CR tablet Take 1 tablet (10 mEq total) by mouth daily. (Patient taking differently: Take 20 mEq by mouth daily. )     rivastigmine (EXELON) 4.6 mg/24 hr Place 1 patch on the skin daily.     simvastatin (ZOCOR) 20 MG tablet Take 20 mg by mouth at bedtime.     tamsulosin (FLOMAX) 0.4 mg Cp24 Take 1 capsule (0.4 mg total) by mouth daily after supper.     triamcinolone (KENALOG) 0.5 % ointment Apply bid to affected area for 2-3 weeks (Patient taking differently: Apply 1 application topically 2 (two) times a day. Apply bid to affected area for 2-3 weeks)     warfarin (COUMADIN) 2.5 MG tablet 5 mg ( 2 tab)  2/7and 2/8  then 2.5 mg kathya with goal inr 2-3       Labs:  Recent Results (from the past 168 hour(s))   ECG 12 lead nursing unit performed    Collection Time: 02/04/18  3:38 AM   Result Value Ref Range    SYSTOLIC BLOOD PRESSURE 170 mmHg    DIASTOLIC BLOOD PRESSURE 92 mmHg    VENTRICULAR RATE 89 BPM    ATRIAL RATE 89 BPM    P-R INTERVAL 216 ms    QRS DURATION 144 ms    Q-T INTERVAL 412 ms    QTC CALCULATION (BEZET) 501 ms    P Axis 56 degrees    R AXIS -58 degrees    T AXIS 71 degrees    MUSE DIAGNOSIS       Sinus rhythm with 1st degree A-V block  Left axis deviation  Non-specific intra-ventricular conduction delay  Abnormal ECG  When compared with ECG of 01-FEB-2018 23:36,  AR interval has increased  Vent. rate has decreased    Confirmed by KI HARVEY MD LOC: ([id number]) on 2/4/2018 3:35:10 PM     Comprehensive metabolic panel    Collection Time:  02/04/18  3:40 AM   Result Value Ref Range    Sodium 138 136 - 145 mmol/L    Potassium 3.3 (L) 3.5 - 5.0 mmol/L    Chloride 109 (H) 98 - 107 mmol/L    CO2 17 (L) 22 - 31 mmol/L    Anion Gap, Calculation 12 5 - 18 mmol/L    Glucose 164 (H) 70 - 125 mg/dL    BUN 36 (H) 8 - 28 mg/dL    Creatinine 3.69 (H) 0.70 - 1.30 mg/dL    GFR MDRD Af Amer 20 (L) >60 mL/min/1.73m2    GFR MDRD Non Af Amer 16 (L) >60 mL/min/1.73m2    Bilirubin, Total 0.3 0.0 - 1.0 mg/dL    Calcium 8.5 8.5 - 10.5 mg/dL    Protein, Total 7.8 6.0 - 8.0 g/dL    Albumin 3.0 (L) 3.5 - 5.0 g/dL    Alkaline Phosphatase 120 45 - 120 U/L    AST 25 0 - 40 U/L    ALT 15 0 - 45 U/L   Protime-INR    Collection Time: 02/04/18  3:40 AM   Result Value Ref Range    INR 1.08 0.90 - 1.10   Troponin I    Collection Time: 02/04/18  3:40 AM   Result Value Ref Range    Troponin I 0.18 0.00 - 0.29 ng/mL   HM1 (CBC with Diff)    Collection Time: 02/04/18  3:40 AM   Result Value Ref Range    WBC 10.0 4.0 - 11.0 thou/uL    RBC 3.34 (L) 4.40 - 6.20 mill/uL    Hemoglobin 10.6 (L) 14.0 - 18.0 g/dL    Hematocrit 30.9 (L) 40.0 - 54.0 %    MCV 93 80 - 100 fL    MCH 31.7 27.0 - 34.0 pg    MCHC 34.3 32.0 - 36.0 g/dL    RDW 13.5 11.0 - 14.5 %    Platelets 231 140 - 440 thou/uL    MPV 8.8 8.5 - 12.5 fL    Neutrophils % 67 50 - 70 %    Lymphocytes % 16 (L) 20 - 40 %    Monocytes % 10 2 - 10 %    Eosinophils % 6 0 - 6 %    Basophils % 1 0 - 2 %    Neutrophils Absolute 6.7 2.0 - 7.7 thou/uL    Lymphocytes Absolute 1.6 0.8 - 4.4 thou/uL    Monocytes Absolute 1.0 (H) 0.0 - 0.9 thou/uL    Eosinophils Absolute 0.6 (H) 0.0 - 0.4 thou/uL    Basophils Absolute 0.1 0.0 - 0.2 thou/uL   Glycosylated Hemoglobin A1C    Collection Time: 02/04/18  3:40 AM   Result Value Ref Range    Hemoglobin A1c 6.6 (H) 4.2 - 6.1 %   Blood Culture 1st    Collection Time: 02/04/18  4:28 AM   Result Value Ref Range    Anaerobic Blood Culture Bottle No Growth No Growth, No organisms seen, bottle returned to instrument,  Specimen not received    Aerobic Blood Culture Bottle No Growth No Growth, No organisms seen, bottle returned to instrument, Specimen not received   Lactic Acid    Collection Time: 02/04/18  4:28 AM   Result Value Ref Range    Lactic Acid 1.3 0.5 - 2.2 mmol/L   Blood Gases, Venous    Collection Time: 02/04/18  4:28 AM   Result Value Ref Range    pH, Venous 7.37 7.35 - 7.45    pCO2, Venous 36 35 - 50 mm Hg    pO2, Luis 55 (H) 25 - 47 mm Hg    Base Excess, Venous -4.0 mmol/L    HCO3, Venous 21.6 (L) 24.0 - 30.0 mmol/L    Oxyhemoglobin 88.3 (H) 70.0 - 75.0 %    O2 Sat, Venous 91.9 (H) 70.0 - 75.0 %   Urinalysis-UC if Indicated    Collection Time: 02/04/18  5:17 AM   Result Value Ref Range    Color, UA Straw Colorless, Yellow, Straw, Light Yellow    Clarity, UA Clear Clear    Glucose,  mg/dL (!) Negative    Bilirubin, UA Negative Negative    Ketones, UA Negative Negative    Specific Gravity, UA 1.015 1.001 - 1.030    Blood, UA Moderate (!) Negative    pH, UA 6.0 4.5 - 8.0    Protein,  mg/dL (!) Negative mg/dL    Urobilinogen, UA <2.0 E.U./dL <2.0 E.U./dL, 2.0 E.U./dL    Nitrite, UA Negative Negative    Leukocytes, UA Small (!) Negative    Bacteria, UA Few (!) None Seen hpf    RBC, UA 0-2 None Seen, 0-2 hpf    WBC, UA 5-10 (!) None Seen, 0-5 hpf    Squam Epithel, UA 0-5 None Seen, 0-5 lpf    WBC Clumps Present (!) None Seen    Mucus, UA Few (!) None Seen lpf    Granular Casts, UA 5-10 (!) None Seen lpf   Culture, Urine    Collection Time: 02/04/18  5:17 AM   Result Value Ref Range    Culture Mixture of urogenital organisms    POCT Glucose    Collection Time: 02/04/18  8:30 AM   Result Value Ref Range    Glucose,  mg/dL   POCT Glucose    Collection Time: 02/04/18 12:44 PM   Result Value Ref Range    Glucose,  mg/dL   POCT Glucose    Collection Time: 02/04/18  5:01 PM   Result Value Ref Range    Glucose,  mg/dL   POCT Glucose    Collection Time: 02/05/18  6:41 AM   Result Value Ref Range     Glucose,  mg/dL   POCT Glucose    Collection Time: 02/05/18  1:55 PM   Result Value Ref Range    Glucose,  mg/dL   Potassium    Collection Time: 02/05/18  4:04 PM   Result Value Ref Range    Potassium 3.4 (L) 3.5 - 5.0 mmol/L   POCT Glucose    Collection Time: 02/05/18  4:49 PM   Result Value Ref Range    Glucose,  mg/dL   POCT Glucose    Collection Time: 02/05/18  8:56 PM   Result Value Ref Range    Glucose,  mg/dL   POCT Glucose    Collection Time: 02/06/18  6:21 AM   Result Value Ref Range    Glucose,  mg/dL   INR    Collection Time: 02/06/18  6:28 AM   Result Value Ref Range    INR 1.14 (H) 0.90 - 1.10   Basic Metabolic Panel    Collection Time: 02/06/18  6:28 AM   Result Value Ref Range    Sodium 139 136 - 145 mmol/L    Potassium 3.3 (L) 3.5 - 5.0 mmol/L    Chloride 109 (H) 98 - 107 mmol/L    CO2 20 (L) 22 - 31 mmol/L    Anion Gap, Calculation 10 5 - 18 mmol/L    Glucose 123 70 - 125 mg/dL    Calcium 8.3 (L) 8.5 - 10.5 mg/dL    BUN 35 (H) 8 - 28 mg/dL    Creatinine 3.94 (H) 0.70 - 1.30 mg/dL    GFR MDRD Af Amer 18 (L) >60 mL/min/1.73m2    GFR MDRD Non Af Amer 15 (L) >60 mL/min/1.73m2   Platelet Count - every other day x 3    Collection Time: 02/06/18  6:28 AM   Result Value Ref Range    Platelets 208 140 - 440 thou/uL   POCT Glucose    Collection Time: 02/06/18 11:52 AM   Result Value Ref Range    Glucose,  mg/dL   Potassium    Collection Time: 02/06/18 12:40 PM   Result Value Ref Range    Potassium 3.2 (L) 3.5 - 5.0 mmol/L   Magnesium    Collection Time: 02/06/18 12:40 PM   Result Value Ref Range    Magnesium 1.5 (L) 1.8 - 2.6 mg/dL   POCT Glucose    Collection Time: 02/06/18  5:31 PM   Result Value Ref Range    Glucose,  mg/dL   Potassium    Collection Time: 02/06/18  9:55 PM   Result Value Ref Range    Potassium 3.9 3.5 - 5.0 mmol/L   INR    Collection Time: 02/07/18  6:15 AM   Result Value Ref Range    INR 1.22 (H) 0.90 - 1.10   Basic Metabolic Panel     Collection Time: 02/07/18  6:15 AM   Result Value Ref Range    Sodium 138 136 - 145 mmol/L    Potassium 3.6 3.5 - 5.0 mmol/L    Chloride 108 (H) 98 - 107 mmol/L    CO2 22 22 - 31 mmol/L    Anion Gap, Calculation 8 5 - 18 mmol/L    Glucose 118 70 - 125 mg/dL    Calcium 8.2 (L) 8.5 - 10.5 mg/dL    BUN 34 (H) 8 - 28 mg/dL    Creatinine 4.22 (H) 0.70 - 1.30 mg/dL    GFR MDRD Af Amer 17 (L) >60 mL/min/1.73m2    GFR MDRD Non Af Amer 14 (L) >60 mL/min/1.73m2   POCT Glucose    Collection Time: 02/07/18  6:37 AM   Result Value Ref Range    Glucose,  mg/dL   POCT Glucose    Collection Time: 02/07/18 11:53 AM   Result Value Ref Range    Glucose,  mg/dL       Assessment/Plan:  1.  DM: refuses all insulin which will be d/c'd, monitor BS BID, increase orals if needed  2.  HTN: continue hydralazine , coreg, imdur and lasix  3.  CHF: Continue furosemide 40 mg BID, weight stable at 175lb, monitor  4.  Afib: pending INR, on amiodarone for rate control  5.  ESRD: last Cr 4.22 with GFR 34, no HD  6.  Insomnia: refuses trazodone so will dc, monitor sleep  7.  Hypomagnesia: last 1.5, mag oxide 400mg BID, monitor  8.  Hypokalemia: 10mEq daily, last 3.6  9.  Agitation: stable, monitor  10: Elopement: in locked unit, no concern  11. Dementia: Exelon patch, no change  12. Hypothyroidism: last TSH 3.37 on 11/17    The care plan has been reviewed and all orders signed. Changes to care plan, if any, as noted. Otherwise, continue care plan of care.  The total time spent with this patient was greater than 40 minutes, with greater than 50% spent in counseling and coordination of care that included multiple issues per chart review and POC.       Electronically signed by: Reji Dougherty NP

## 2021-06-15 NOTE — PROGRESS NOTES
"Chief Complaint   Patient presents with     Follow-up     [facility], INR       HPI: Patient discharged from [facility] on 1/18/2018 and according to the patient no medication changes were initiated.  Unfortunately, discharge summary is unable to be viewed on care everywhere at this time.  The patient is not taking any medications and refuses to take any medications.  He was evaluated, according to his caregiver Katerina, by psychiatry at [facility] and was released to his own care with nursing follow-up.  The patient refuses any medications and he does not want nursing or anybody to come to his home.  He is cared for by his daughter and his ex-wife and appears to get along relatively well.  I am told that his last blood sugar last week was 140.  He refuses warfarin as well as taking any insulin or checking blood sugars.    ROS: No chest pain or shortness of breath or bleeding    SH: The Patient's  reports that he quit smoking about 40 years ago. He has never used smokeless tobacco. He reports that he does not drink alcohol or use illicit drugs.     FH: The Patient's family history includes Heart disease in his mother; Mental illness in his father.    Meds:  Scout has a current medication list which includes the following prescription(s): amiodarone, carvedilol, docoshexanoic acid-eicosapent 500 mg, furosemide, glipizide, hydralazine, insulin aspart, insulin glargine, isosorbide mononitrate, levothyroxine, multivit-min-fa-lycopen-lutein, novolog, potassium chloride, simvastatin, tamsulosin, triamcinolone, and warfarin.    O:  /80  Pulse 98  Resp 16  Ht 5' 6\" (1.676 m)  Wt 175 lb 7 oz (79.6 kg)  SpO2 98%  BMI 28.32 kg/m2     Lungs--Clear to Auscultation  Heart--Regular rate and rhythm  Abdomen--Soft, non-tender, non-distended  Skin-Pink and dry     A/P:   1. Diabetes type 2, controlled  -Stable    2. Essential Hypercholesterolemia  -Unchange    3. Benign Essential Hypertension  -Stable " today    4. Paroxysmal atrial fibrillation  -Heart appeared regular today during evaluation    5. CKD (chronic kidney disease) stage 5, GFR less than 15 ml/min  -Unchanged    This is a difficult case.  Patient appears to be relatively mentally competent and was declared so [facility] staff.  Unfortunately, he does not wish to take any medications or of any interventions.  We discussed risks and benefits and he is fully aware that he should be taking his medications for better health but refuses to do so.  Despite my persuasive efforts he would like to continue with no medications and states he will come in when needed.  His caregivers will continue to check on him.

## 2021-06-16 NOTE — PROGRESS NOTES
[facility]      Code Status:  POLST AVAILABLE  Visit Type: H & P     Facility:  [facility] [[id number]]           History of Present Illness: Scout Batista is a 77 y.o. male is currently admitted to the TCU as a transfer from the hospital  This is a 77-year-old who lives in his own home with underlying history of congestive heart failure with systolic dysfunction along with diabetes; chronic kidney disease stage IV and hypertension along with a previous history of CVA who was brought in to the emergency room with underlying history of severe weakness unsteady gait.  Family noticed that he was much more confused  He was admitted with acute encephalopathy with underlying history of dementia with cognitive decline workup was negative for any CVA or any CT changes he was noted to have a UTI and started on antibiotics however his slums score was 11/30 and the recommended placement for him.  He was  discharged to the TCU  Unfortunately remains a severe elopement risk and hence he has been put in a locked dementia unit.  He has done well in therapy and is ambulating.  However he remains very behavioral.  He has had almost a daily elopement event.  He has taken his wander guard off and tries to leave the floor repeatedly several times he has needed as far as the main floor of the TCU from his locked unit.  He has had one elopement event in the past when he had actually eloped to the streets.  He has been refusing his medications and insulins repeatedly.  His kidney functions remain impaired and he has refused dialysis in the past.    Past Medical History:   Diagnosis Date     Aortic stenosis 5/4/2015     Benign Essential Hypertension     Created by Conversion      Chronic Kidney Disease, Stage 3     Created by Conversion      Diabetes With Renal Manifestations     Created by Conversion      Essential Hypercholesterolemia     Created by Conversion      Type 2 Diabetes Mellitus      Created by Conversion      Vertigo     Created by Conversion      Past Surgical History:   Procedure Laterality Date     CATARACT EXTRACTION       Family History   Problem Relation Age of Onset     Heart disease Mother      Mental illness Father      Social History     Social History     Marital status:      Spouse name: N/A     Number of children: N/A     Years of education: N/A     Occupational History     Not on file.     Social History Main Topics     Smoking status: Former Smoker     Quit date: 5/26/1977     Smokeless tobacco: Never Used     Alcohol use No     Drug use: No     Sexual activity: Not on file     Other Topics Concern     Not on file     Social History Narrative     Current Outpatient Prescriptions   Medication Sig Dispense Refill     acetaminophen (TYLENOL) 500 MG tablet Take 1-2 tablets (500-1,000 mg total) by mouth every 6 (six) hours as needed.  0     amiodarone (PACERONE) 200 MG tablet Take 1 tablet (200 mg total) by mouth every other day. 90 tablet 2     carvedilol (COREG) 25 MG tablet Take 25 mg by mouth 2 (two) times a day with meals.       furosemide (LASIX) 40 MG tablet Take 1 tablet (40 mg total) by mouth 2 (two) times a day at 9am and 6pm. (Patient taking differently: Take 40 mg by mouth daily. )  0     glipiZIDE (GLUCOTROL XL) 2.5 MG 24 hr tablet Take 1 tablet by mouth once daily before breakfast 90 tablet 0     hydrALAZINE (APRESOLINE) 10 MG tablet Take 1 tablet (10 mg total) by mouth 4 (four) times a day.  0     isosorbide mononitrate (IMDUR) 30 MG 24 hr tablet Take 30 mg by mouth daily.       levothyroxine (SYNTHROID, LEVOTHROID) 100 MCG tablet Take 100 mcg by mouth daily.       magnesium oxide (MAG-OX) 400 mg tablet Take 400 mg by mouth daily.       melatonin 3 mg Tab tablet Take 6 mg by mouth at bedtime.       multivit-min-FA-lycopen-lutein (CENTRUM SILVER) 0.4-300-250 mg-mcg-mcg tablet Take 1 tablet by mouth daily.       OLANZapine (ZYPREXA) 2.5 MG tablet Take 2.5 mg by  mouth at bedtime.       potassium chloride (KLOR-CON) 10 MEQ CR tablet Take 1 tablet (10 mEq total) by mouth daily. (Patient taking differently: Take 20 mEq by mouth daily. ) 30 tablet 5     rivastigmine (EXELON) 4.6 mg/24 hr Place 1 patch on the skin daily. 30 patch 0     tamsulosin (FLOMAX) 0.4 mg Cp24 Take 1 capsule (0.4 mg total) by mouth daily after supper.  0     triamcinolone (KENALOG) 0.5 % ointment Apply bid to affected area for 2-3 weeks (Patient taking differently: Apply 1 application topically 2 (two) times a day. Apply bid to affected area for 2-3 weeks) 30 g 2     WARFARIN SODIUM (WARFARIN ORAL) Take by mouth. 2/15/18 INR 2.1(fingerstick)  Continue taking 4mg daily.  Next INR 2/19/18.       No current facility-administered medications for this visit.      No Known Allergies      Review of Systems:    Constitutional: Negative.  Negative for fever, chills,Has activity change, appetite change and fatigue.   HENT: Negative for congestion and facial swelling.    Eyes: Negative for photophobia, redness and visual disturbance.   Respiratory: Negative for cough and chest tightness.    Cardiovascular: Negative for chest pain, palpitations and leg swelling.   Gastrointestinal: Negative for nausea, diarrhea, constipation, blood in stool and abdominal distention.   Genitourinary: Negative.    Musculoskeletal: Negative.    He has some gait instability  Skin: Negative.    Neurological: Negative for dizziness, tremors, syncope, weakness, light-headedness and headaches.   Hematological: Does not bruise/bleed easily.   Psychiatric/Behavioral: Negative.    Confused    Vitals:    03/12/18 1136   BP: 150/68   Pulse: 65   Resp: 17   Temp: 98  F (36.7  C)       Physical Exam:    GENERAL: no acute distress. Cooperative in conversation.   HEENT: pupils are equal, round and reactive. Oral mucosa is moist and intact.  RESP:Chest symmetric. Regular respiratory rate. No stridor.  CVS: S1S2  ABD: Nondistended, soft.  EXTREMITIES:  No lower extremity edema.   NEURO: non focal. Alert and oriented x self.   PSYCH: within normal limits. No depression or anxiety.  SKIN: warm dry intact     Labs:    INR 2.4  Results for orders placed or performed during the hospital encounter of 02/04/18   Basic Metabolic Panel   Result Value Ref Range    Sodium 138 136 - 145 mmol/L    Potassium 3.6 3.5 - 5.0 mmol/L    Chloride 108 (H) 98 - 107 mmol/L    CO2 22 22 - 31 mmol/L    Anion Gap, Calculation 8 5 - 18 mmol/L    Glucose 118 70 - 125 mg/dL    Calcium 8.2 (L) 8.5 - 10.5 mg/dL    BUN 34 (H) 8 - 28 mg/dL    Creatinine 4.22 (H) 0.70 - 1.30 mg/dL    GFR MDRD Af Amer 17 (L) >60 mL/min/1.73m2    GFR MDRD Non Af Amer 14 (L) >60 mL/min/1.73m2       Assessment/Plan:   Cognitive impairment with progressive confusion.  He was admitted with acute toxic encephalopathy.  Initial slums was 11/30;  CT did not show any acute findings and it has been recommended he should be in the TCU for rehab.   He is now independent with all his ADLs but remains confused  He also remains a high elopement risk as per staff and has tried to get out of the building several times apparently he chewed his alarms and was found in the main floor unlocked unit trying to escape,  Currently on Exelon patch.  Started on Zyprexa 2.5 at bedtime by the nurse practitioner.  A locked memory care dementia unit is recommended for him due to his high elopement risk    chronic kidney disease stage IV.   His last creatinine was 4.22 in the past he has refused hemodialysis     Congestive heart failure chronic with systolic dysfunction appear to be euvolemic  He is on Lasix at a lower dose of 40 daily    A. fib continues on his anticoagulation monitor INRs  Currently rate controlled on Coreg along with amiodarone    Hypertension on multiple medications including Coreg, hydralazine    Diabetes type 2   He is currently on insulin along with sliding scale monitor his blood sugars.  He is also on a low-dose of  glipizide.  Due to his refusal his blood sugar checks have been cut down to twice daily and he has been taken off insulin .  continue to monitor blood sugars.  Recheck hemoglobin A1c to assess control in a few weeks.    Hyperlipidemia he is on simvastatin    Hypothyroidism on replacement Synthroid    BPH given Flomax    Depression    Anemia of chronic kidney disease     Nonischemic cardiomyopathy with an ejection fraction of 40% on Coreg    Mild aortic stenoses    Gait instability with progressive weakness felt to be partly due to medication noncompliance cognitive decline as well as progressive renal impairment  Patient has had stable mood and behaviors.  However cognitive testing results impaired scores with minimal recall noted on exam  He is at risk of elopement with multiple attempts made on most on a daily basis to leave the floor he has been able to remove his bracelets on his own.  He has several attempts made but he has partially succeeded  In light of his current behaviors a locked memory care unit placement in assisted living versus long-term care was recommended and family has currently elected to move him to long-term care pending appropriate placement.  Total time spent was 45 minutes, more than half of it was in face-to-face counseling regarding disease state, treatment, side effects, documentation, review of clinical data and coordination of care    Electronically signed by: CARA Carey  This progress note was completed using Dragon software and there may be grammatical errors.

## 2021-06-16 NOTE — ANESTHESIA POSTPROCEDURE EVALUATION
Patient: Scout Batista  LEFT HIP OPEN REDUCTION INTERNAL FIXATION  Anesthesia type: general    Patient location: PACU  Last vitals:   Vitals:    03/25/18 1800   BP: 144/67   Pulse: 67   Resp: 12   Temp: 37.3  C (99.1  F)   SpO2: 90%     Post vital signs: stable  Level of consciousness: awake and responds to simple questions  Post-anesthesia pain: pain controlled  Post-anesthesia nausea and vomiting: no  Pulmonary: unassisted, return to baseline  Cardiovascular: stable and blood pressure at baseline  Hydration: adequate  Anesthetic events: no    QCDR Measures:  ASA# 11 - Hattie-op Cardiac Arrest: ASA11B - Patient did NOT experience unanticipated cardiac arrest  ASA# 12 - Hattie-op Mortality Rate: ASA12B - Patient did NOT die  ASA# 13 - PACU Re-Intubation Rate: ASA13B - Patient did NOT require a new airway mgmt  ASA# 10 - Composite Anes Safety: ASA10A - No serious adverse event    Additional Notes:

## 2021-06-16 NOTE — PROGRESS NOTES
[facility]      Code Status:  POLST AVAILABLE  Visit Type: H & P     Facility:  [facility] [[id number]]           History of Present Illness: Scout Batista is a 77 y.o. male is currently admitted to the TCU as a transfer from the hospital  This is a 77-year-old who lives in his own home with underlying history of congestive heart failure with systolic dysfunction along with diabetes; chronic kidney disease stage IV and hypertension along with a previous history of CVA who was brought in to the emergency room with underlying history of severe weakness unsteady gait.  Family noticed that he was much more confused  He was admitted with acute encephalopathy with underlying history of dementia with cognitive decline workup was negative for any CVA or any CT changes he was noted to have a UTI and started on antibiotics however his slums score was 11/30 and the recommended placement for him.  He has been discharged to the TCU  Unfortunately remains a severe elopement risk and hence he has been put in a locked dementia unit    Past Medical History:   Diagnosis Date     Aortic stenosis 5/4/2015     Benign Essential Hypertension     Created by Conversion      Chronic Kidney Disease, Stage 3     Created by Conversion      Diabetes With Renal Manifestations     Created by Conversion      Essential Hypercholesterolemia     Created by Conversion      Type 2 Diabetes Mellitus     Created by Conversion      Vertigo     Created by Conversion      Past Surgical History:   Procedure Laterality Date     CATARACT EXTRACTION       Family History   Problem Relation Age of Onset     Heart disease Mother      Mental illness Father      Social History     Social History     Marital status:      Spouse name: N/A     Number of children: N/A     Years of education: N/A     Occupational History     Not on file.     Social History Main Topics     Smoking status: Former Smoker     Quit date:  5/26/1977     Smokeless tobacco: Never Used     Alcohol use No     Drug use: No     Sexual activity: Not on file     Other Topics Concern     Not on file     Social History Narrative     Current Outpatient Prescriptions   Medication Sig Dispense Refill     acetaminophen (TYLENOL) 500 MG tablet Take 1-2 tablets (500-1,000 mg total) by mouth every 6 (six) hours as needed.  0     amiodarone (PACERONE) 200 MG tablet Take 1 tablet (200 mg total) by mouth every other day. 90 tablet 2     carvedilol (COREG) 25 MG tablet Take 25 mg by mouth 2 (two) times a day with meals.       furosemide (LASIX) 40 MG tablet Take 1 tablet (40 mg total) by mouth 2 (two) times a day at 9am and 6pm. (Patient taking differently: Take 40 mg by mouth daily. )  0     glipiZIDE (GLUCOTROL XL) 2.5 MG 24 hr tablet Take 1 tablet by mouth once daily before breakfast 90 tablet 0     hydrALAZINE (APRESOLINE) 10 MG tablet Take 1 tablet (10 mg total) by mouth 4 (four) times a day.  0     isosorbide mononitrate (IMDUR) 30 MG 24 hr tablet Take 30 mg by mouth daily.       levothyroxine (SYNTHROID, LEVOTHROID) 100 MCG tablet Take 100 mcg by mouth daily.       magnesium oxide (MAG-OX) 400 mg tablet Take 1 tablet (400 mg total) by mouth 2 (two) times a day.  0     multivit-min-FA-lycopen-lutein (CENTRUM SILVER) 0.4-300-250 mg-mcg-mcg tablet Take 1 tablet by mouth daily.       potassium chloride (KLOR-CON) 10 MEQ CR tablet Take 1 tablet (10 mEq total) by mouth daily. (Patient taking differently: Take 20 mEq by mouth daily. ) 30 tablet 5     rivastigmine (EXELON) 4.6 mg/24 hr Place 1 patch on the skin daily. 30 patch 0     tamsulosin (FLOMAX) 0.4 mg Cp24 Take 1 capsule (0.4 mg total) by mouth daily after supper.  0     triamcinolone (KENALOG) 0.5 % ointment Apply bid to affected area for 2-3 weeks (Patient taking differently: Apply 1 application topically 2 (two) times a day. Apply bid to affected area for 2-3 weeks) 30 g 2     warfarin (COUMADIN) 2.5 MG tablet 5  mg ( 2 tab)  2/7and 2/8  then 2.5 mg kathya with goal inr 2-3  0     No current facility-administered medications for this visit.      No Known Allergies      Review of Systems:    Constitutional: Negative.  Negative for fever, chills,Has activity change, appetite change and fatigue.   HENT: Negative for congestion and facial swelling.    Eyes: Negative for photophobia, redness and visual disturbance.   Respiratory: Negative for cough and chest tightness.    Cardiovascular: Negative for chest pain, palpitations and leg swelling.   Gastrointestinal: Negative for nausea, diarrhea, constipation, blood in stool and abdominal distention.   Genitourinary: Negative.    Musculoskeletal: Negative.    He has some gait instability  Skin: Negative.    Neurological: Negative for dizziness, tremors, syncope, weakness, light-headedness and headaches.   Hematological: Does not bruise/bleed easily.   Psychiatric/Behavioral: Negative.    Confused    Vitals:    02/12/18 1059   BP: 138/70   Pulse: 77   Temp: 98  F (36.7  C)       Physical Exam:    GENERAL: no acute distress. Cooperative in conversation.   HEENT: pupils are equal, round and reactive. Oral mucosa is moist and intact.  RESP:Chest symmetric. Regular respiratory rate. No stridor.  CVS: S1S2  ABD: Nondistended, soft.  EXTREMITIES: No lower extremity edema.   NEURO: non focal. Alert and oriented x self.   PSYCH: within normal limits. No depression or anxiety.  SKIN: warm dry intact     Labs:    Recent Results (from the past 240 hour(s))   ECG 12 lead nursing unit performed   Result Value Ref Range    SYSTOLIC BLOOD PRESSURE 170 mmHg    DIASTOLIC BLOOD PRESSURE 92 mmHg    VENTRICULAR RATE 89 BPM    ATRIAL RATE 89 BPM    P-R INTERVAL 216 ms    QRS DURATION 144 ms    Q-T INTERVAL 412 ms    QTC CALCULATION (BEZET) 501 ms    P Axis 56 degrees    R AXIS -58 degrees    T AXIS 71 degrees    MUSE DIAGNOSIS       Sinus rhythm with 1st degree A-V block  Left axis deviation  Non-specific  intra-ventricular conduction delay  Abnormal ECG  When compared with ECG of 01-FEB-2018 23:36,  RI interval has increased  Vent. rate has decreased    Confirmed by KI HARVEY MD LOC: ([id number]) on 2/4/2018 3:35:10 PM     Comprehensive metabolic panel   Result Value Ref Range    Sodium 138 136 - 145 mmol/L    Potassium 3.3 (L) 3.5 - 5.0 mmol/L    Chloride 109 (H) 98 - 107 mmol/L    CO2 17 (L) 22 - 31 mmol/L    Anion Gap, Calculation 12 5 - 18 mmol/L    Glucose 164 (H) 70 - 125 mg/dL    BUN 36 (H) 8 - 28 mg/dL    Creatinine 3.69 (H) 0.70 - 1.30 mg/dL    GFR MDRD Af Amer 20 (L) >60 mL/min/1.73m2    GFR MDRD Non Af Amer 16 (L) >60 mL/min/1.73m2    Bilirubin, Total 0.3 0.0 - 1.0 mg/dL    Calcium 8.5 8.5 - 10.5 mg/dL    Protein, Total 7.8 6.0 - 8.0 g/dL    Albumin 3.0 (L) 3.5 - 5.0 g/dL    Alkaline Phosphatase 120 45 - 120 U/L    AST 25 0 - 40 U/L    ALT 15 0 - 45 U/L   Protime-INR   Result Value Ref Range    INR 1.08 0.90 - 1.10   Troponin I   Result Value Ref Range    Troponin I 0.18 0.00 - 0.29 ng/mL   HM1 (CBC with Diff)   Result Value Ref Range    WBC 10.0 4.0 - 11.0 thou/uL    RBC 3.34 (L) 4.40 - 6.20 mill/uL    Hemoglobin 10.6 (L) 14.0 - 18.0 g/dL    Hematocrit 30.9 (L) 40.0 - 54.0 %    MCV 93 80 - 100 fL    MCH 31.7 27.0 - 34.0 pg    MCHC 34.3 32.0 - 36.0 g/dL    RDW 13.5 11.0 - 14.5 %    Platelets 231 140 - 440 thou/uL    MPV 8.8 8.5 - 12.5 fL    Neutrophils % 67 50 - 70 %    Lymphocytes % 16 (L) 20 - 40 %    Monocytes % 10 2 - 10 %    Eosinophils % 6 0 - 6 %    Basophils % 1 0 - 2 %    Neutrophils Absolute 6.7 2.0 - 7.7 thou/uL    Lymphocytes Absolute 1.6 0.8 - 4.4 thou/uL    Monocytes Absolute 1.0 (H) 0.0 - 0.9 thou/uL    Eosinophils Absolute 0.6 (H) 0.0 - 0.4 thou/uL    Basophils Absolute 0.1 0.0 - 0.2 thou/uL   Glycosylated Hemoglobin A1C   Result Value Ref Range    Hemoglobin A1c 6.6 (H) 4.2 - 6.1 %   Blood Culture 1st   Result Value Ref Range    Anaerobic Blood Culture Bottle No Growth No Growth, No  organisms seen, bottle returned to instrument, Specimen not received    Aerobic Blood Culture Bottle No Growth No Growth, No organisms seen, bottle returned to instrument, Specimen not received   Lactic Acid   Result Value Ref Range    Lactic Acid 1.3 0.5 - 2.2 mmol/L   Blood Gases, Venous   Result Value Ref Range    pH, Venous 7.37 7.35 - 7.45    pCO2, Venous 36 35 - 50 mm Hg    pO2, Luis 55 (H) 25 - 47 mm Hg    Base Excess, Venous -4.0 mmol/L    HCO3, Venous 21.6 (L) 24.0 - 30.0 mmol/L    Oxyhemoglobin 88.3 (H) 70.0 - 75.0 %    O2 Sat, Venous 91.9 (H) 70.0 - 75.0 %   Urinalysis-UC if Indicated   Result Value Ref Range    Color, UA Straw Colorless, Yellow, Straw, Light Yellow    Clarity, UA Clear Clear    Glucose,  mg/dL (!) Negative    Bilirubin, UA Negative Negative    Ketones, UA Negative Negative    Specific Gravity, UA 1.015 1.001 - 1.030    Blood, UA Moderate (!) Negative    pH, UA 6.0 4.5 - 8.0    Protein,  mg/dL (!) Negative mg/dL    Urobilinogen, UA <2.0 E.U./dL <2.0 E.U./dL, 2.0 E.U./dL    Nitrite, UA Negative Negative    Leukocytes, UA Small (!) Negative    Bacteria, UA Few (!) None Seen hpf    RBC, UA 0-2 None Seen, 0-2 hpf    WBC, UA 5-10 (!) None Seen, 0-5 hpf    Squam Epithel, UA 0-5 None Seen, 0-5 lpf    WBC Clumps Present (!) None Seen    Mucus, UA Few (!) None Seen lpf    Granular Casts, UA 5-10 (!) None Seen lpf   Culture, Urine   Result Value Ref Range    Culture Mixture of urogenital organisms    POCT Glucose   Result Value Ref Range    Glucose,  mg/dL   POCT Glucose   Result Value Ref Range    Glucose,  mg/dL   POCT Glucose   Result Value Ref Range    Glucose,  mg/dL   POCT Glucose   Result Value Ref Range    Glucose,  mg/dL   POCT Glucose   Result Value Ref Range    Glucose,  mg/dL   Potassium   Result Value Ref Range    Potassium 3.4 (L) 3.5 - 5.0 mmol/L   POCT Glucose   Result Value Ref Range    Glucose,  mg/dL   POCT Glucose   Result  Value Ref Range    Glucose,  mg/dL   POCT Glucose   Result Value Ref Range    Glucose,  mg/dL   INR   Result Value Ref Range    INR 1.14 (H) 0.90 - 1.10   Basic Metabolic Panel   Result Value Ref Range    Sodium 139 136 - 145 mmol/L    Potassium 3.3 (L) 3.5 - 5.0 mmol/L    Chloride 109 (H) 98 - 107 mmol/L    CO2 20 (L) 22 - 31 mmol/L    Anion Gap, Calculation 10 5 - 18 mmol/L    Glucose 123 70 - 125 mg/dL    Calcium 8.3 (L) 8.5 - 10.5 mg/dL    BUN 35 (H) 8 - 28 mg/dL    Creatinine 3.94 (H) 0.70 - 1.30 mg/dL    GFR MDRD Af Amer 18 (L) >60 mL/min/1.73m2    GFR MDRD Non Af Amer 15 (L) >60 mL/min/1.73m2   Platelet Count - every other day x 3   Result Value Ref Range    Platelets 208 140 - 440 thou/uL   POCT Glucose   Result Value Ref Range    Glucose,  mg/dL   Potassium   Result Value Ref Range    Potassium 3.2 (L) 3.5 - 5.0 mmol/L   Magnesium   Result Value Ref Range    Magnesium 1.5 (L) 1.8 - 2.6 mg/dL   POCT Glucose   Result Value Ref Range    Glucose,  mg/dL   Potassium   Result Value Ref Range    Potassium 3.9 3.5 - 5.0 mmol/L   INR   Result Value Ref Range    INR 1.22 (H) 0.90 - 1.10   Basic Metabolic Panel   Result Value Ref Range    Sodium 138 136 - 145 mmol/L    Potassium 3.6 3.5 - 5.0 mmol/L    Chloride 108 (H) 98 - 107 mmol/L    CO2 22 22 - 31 mmol/L    Anion Gap, Calculation 8 5 - 18 mmol/L    Glucose 118 70 - 125 mg/dL    Calcium 8.2 (L) 8.5 - 10.5 mg/dL    BUN 34 (H) 8 - 28 mg/dL    Creatinine 4.22 (H) 0.70 - 1.30 mg/dL    GFR MDRD Af Amer 17 (L) >60 mL/min/1.73m2    GFR MDRD Non Af Amer 14 (L) >60 mL/min/1.73m2   POCT Glucose   Result Value Ref Range    Glucose,  mg/dL   POCT Glucose   Result Value Ref Range    Glucose,  mg/dL     Xr Chest 2 Views    Result Date: 2/4/2018  XR CHEST 2 VIEWS 2/4/2018 4:46 AM INDICATION: Altered mental status COMPARISON: 2/2/2018 FINDINGS: Negative chest.    Xr Chest 2 Views    Result Date: 2/2/2018  XR CHEST 2 VIEWS 2/2/2018 12:45  AM INDICATION: confusion. COMPARISON: 10/21/2017 FINDINGS: Stable cardiomediastinal silhouette. No focal consolidation or pleural effusion. Degenerative change thoracic spine. Mild midthoracic wedging.    Ct Head Without Contrast    Result Date: 2/4/2018  [facility] CT HEAD WO CONTRAST 2/4/2018 4:53 AM INDICATION: Confusion/delirium, altered LOC, unexplained TECHNIQUE: Without IV contrast. Dose reduction techniques were used. CONTRAST: None COMPARISON:  2/2/2018 FINDINGS: No intracranial hemorrhage, extraaxial collection, mass effect or CT evidence of acute infarct.  Moderate presumed chronic small vessel ischemic changes. Mild to moderate generalized volume loss. The ventricles are proportional to the sulci. Osseous structures are intact. The visualized orbits, paranasal sinuses and mastoid air cells are free of significant disease.     CONCLUSION: 1.  No acute intracranial pathology. 2.  Moderate presumed chronic small vessel ischemic changes and mild to moderate generalized volume loss.     Ct Head Without Contrast    Result Date: 2/2/2018  [facility] CT HEAD WO CONTRAST 2/2/2018 12:29 AM INDICATION: Confusion/delirium, altered LOC, unexplained ams. confusion TECHNIQUE: Head CT without contrast. Dose reduction techniques were used. CONTRAST: None. COMPARISON:  None. FINDINGS: No intracranial hemorrhage, extraaxial collection, mass effect or CT evidence of acute infarct.  There is moderate diffuse low-attenuation within the periventricular and subcortical white matter consistent with moderate small vessel ischemic disease. The ventricular system, basal cisterns and the cortical sulci are consistent with diffuse volume loss. Osseous structures are intact. The orbit regions are unremarkable. The mastoid air cells and the middle ear regions are clear. The sinuses are  clear. There is vascular calcification involving the cavernous portions of the internal carotid arteries.     CONCLUSION: 1.  No  discrete mass lesion, hemorrhage or focal area suggestive of acute edema. 2.  Age-related changes.     Assessment/Plan:   Cognitive impairment with progressive confusion.  He was admitted with acute toxic encephalopathy.  Initial slums was 11/30;  CT did not show any acute findings and it has been recommended he should be in the TCU for rehab.   He is now independent with all his ADLs but remains confused  He also remains a high elopement risk as per staff and has tried to get out of the building several times apparently he chewed his alarms and was found in the main floor unlocked unit trying to escape,  Currently on Exelon patch.    UTI-treated with antibiotics in the hospital    chronic kidney disease stage IV.    Congestive heart failure chronic with systolic dysfunction appear to be euvolemic in the hospital with no evidence of volume overload  A. fib continues on his anticoagulation monitor INRs  Currently rate controlled on Coreg along with amiodarone  Hypertension on multiple medications including Coreg, hydralazine  Diabetes type 2   He is currently on insulin along with sliding scale monitor his blood sugars.  He is also on a low-dose of glipizide.  Due to his refusal his blood sugar checks have been cut down to twice daily and he has been taken off insulin continue to monitor blood sugars.  Hyperlipidemia he is on simvastatin  Hypothyroidism on replacement Synthroid  BPH given Flomax  Depression  Anemia of chronic kidney disease   Nonischemic cardiomyopathy with an ejection fraction of 40% on Coreg  Mild aortic stenoses  Gait instability with progressive weakness felt to be partly due to medication noncompliance cognitive decline as well as progressive renal impairment  There was also significant medication noncompliance with patient refusing to take medications in the hospital he has been discharged to the TCU and has made several elopement events.  He is in the locked dementia unit and may need  alternative placement.  Due to refusal to take insulin medication noncompliance has been taken off insulin and his blood sugar checks have been decreased to twice daily in addition trazodone has been stopped and his fish oil and simvastatin were discontinued toO  Total time spent was 45 minutes, more than half of it was in face-to-face counseling regarding disease state, treatment, side effects, documentation, review of clinical data and coordination of care    Electronically signed by: CARA Carey  This progress note was completed using Dragon software and there may be grammatical errors.

## 2021-06-16 NOTE — ANESTHESIA PREPROCEDURE EVALUATION
Anesthesia Evaluation      Patient summary reviewed   No history of anesthetic complications     Airway   Mallampati: I  Neck ROM: full   Pulmonary - negative ROS and normal exam                          Cardiovascular - normal exam  Exercise tolerance: < 4 METS  (+) hypertension, valvular problems/murmurs AS, dysrhythmias, cardiomyopathy,     (-) murmur  ECG reviewed  Rhythm: regular  Rate: normal,    no murmur      Neuro/Psych    (+) CVA , depression, dementia,     Endo/Other    (+) diabetes mellitus type 2, hypothyroidism,      GI/Hepatic/Renal    (+)   chronic renal disease CRI,           Dental - normal exam                        Anesthesia Plan  Planned anesthetic: general endotracheal    ASA 3   Induction: intravenous   Anesthetic plan and risks discussed with: patient and spouse  Anesthesia plan special considerations: antiemetics,   Post-op plan: routine recovery  DNR/DNI status   DNR/DNI status discussed with spouse.  Plan is for suspension of DNR

## 2021-06-16 NOTE — PROGRESS NOTES
[facility]      Code Status:  POLST AVAILABLE  Visit Type: Review Of Multiple Medical Conditions     Facility:  [facility] [[id number]]           History of Present Illness: Scout Batista is a 77 y.o. male is currently admitted to the TCU as a transfer from the hospital  This is a 77-year-old who lives in his own home with underlying history of congestive heart failure with systolic dysfunction along with diabetes; chronic kidney disease stage IV and hypertension along with a previous history of CVA who was brought in to the emergency room with underlying history of severe weakness unsteady gait.  Family noticed that he was much more confused  He was admitted with acute encephalopathy with underlying history of dementia with cognitive decline workup was negative for any CVA or any CT changes he was noted to have a UTI and started on antibiotics however his slums score was 11/30 and the recommended placement for him.  He has been discharged to the TCU  Unfortunately remains a severe elopement risk and hence he has been put in a locked dementia unit    Past Medical History:   Diagnosis Date     Aortic stenosis 5/4/2015     Benign Essential Hypertension     Created by Conversion      Chronic Kidney Disease, Stage 3     Created by Conversion      Diabetes With Renal Manifestations     Created by Conversion      Essential Hypercholesterolemia     Created by Conversion      Type 2 Diabetes Mellitus     Created by Conversion      Vertigo     Created by Conversion      Past Surgical History:   Procedure Laterality Date     CATARACT EXTRACTION       Family History   Problem Relation Age of Onset     Heart disease Mother      Mental illness Father      Social History     Social History     Marital status:      Spouse name: N/A     Number of children: N/A     Years of education: N/A     Occupational History     Not on file.     Social History Main Topics     Smoking status:  Former Smoker     Quit date: 5/26/1977     Smokeless tobacco: Never Used     Alcohol use No     Drug use: No     Sexual activity: Not on file     Other Topics Concern     Not on file     Social History Narrative     Current Outpatient Prescriptions   Medication Sig Dispense Refill     acetaminophen (TYLENOL) 500 MG tablet Take 1-2 tablets (500-1,000 mg total) by mouth every 6 (six) hours as needed.  0     amiodarone (PACERONE) 200 MG tablet Take 1 tablet (200 mg total) by mouth every other day. 90 tablet 2     carvedilol (COREG) 25 MG tablet Take 25 mg by mouth 2 (two) times a day with meals.       furosemide (LASIX) 40 MG tablet Take 1 tablet (40 mg total) by mouth 2 (two) times a day at 9am and 6pm. (Patient taking differently: Take 40 mg by mouth daily. )  0     glipiZIDE (GLUCOTROL XL) 2.5 MG 24 hr tablet Take 1 tablet by mouth once daily before breakfast 90 tablet 0     hydrALAZINE (APRESOLINE) 10 MG tablet Take 1 tablet (10 mg total) by mouth 4 (four) times a day.  0     isosorbide mononitrate (IMDUR) 30 MG 24 hr tablet Take 30 mg by mouth daily.       levothyroxine (SYNTHROID, LEVOTHROID) 100 MCG tablet Take 100 mcg by mouth daily.       magnesium oxide (MAG-OX) 400 mg tablet Take 1 tablet (400 mg total) by mouth 2 (two) times a day.  0     melatonin 3 mg Tab tablet Take 6 mg by mouth at bedtime.       multivit-min-FA-lycopen-lutein (CENTRUM SILVER) 0.4-300-250 mg-mcg-mcg tablet Take 1 tablet by mouth daily.       potassium chloride (KLOR-CON) 10 MEQ CR tablet Take 1 tablet (10 mEq total) by mouth daily. (Patient taking differently: Take 20 mEq by mouth daily. ) 30 tablet 5     rivastigmine (EXELON) 4.6 mg/24 hr Place 1 patch on the skin daily. 30 patch 0     tamsulosin (FLOMAX) 0.4 mg Cp24 Take 1 capsule (0.4 mg total) by mouth daily after supper.  0     triamcinolone (KENALOG) 0.5 % ointment Apply bid to affected area for 2-3 weeks (Patient taking differently: Apply 1 application topically 2 (two) times a  day. Apply bid to affected area for 2-3 weeks) 30 g 2     WARFARIN SODIUM (WARFARIN ORAL) Take by mouth. 2/15/18 INR 2.1(fingerstick)  Continue taking 4mg daily.  Next INR 2/19/18.       No current facility-administered medications for this visit.      No Known Allergies      Review of Systems:    Constitutional: Negative.  Negative for fever, chills,Has activity change, appetite change and fatigue.   HENT: Negative for congestion and facial swelling.    Eyes: Negative for photophobia, redness and visual disturbance.   Respiratory: Negative for cough and chest tightness.    Cardiovascular: Negative for chest pain, palpitations and leg swelling.   Gastrointestinal: Negative for nausea, diarrhea, constipation, blood in stool and abdominal distention.   Genitourinary: Negative.    Musculoskeletal: Negative.    He has some gait instability  Skin: Negative.    Neurological: Negative for dizziness, tremors, syncope, weakness, light-headedness and headaches.   Hematological: Does not bruise/bleed easily.   Psychiatric/Behavioral: Negative.    Confused    Vitals:    02/21/18 1051   BP: 126/64   Pulse: 62   Temp: 98  F (36.7  C)       Physical Exam:    GENERAL: no acute distress. Cooperative in conversation.   HEENT: pupils are equal, round and reactive. Oral mucosa is moist and intact.  RESP:Chest symmetric. Regular respiratory rate. No stridor.  CVS: S1S2  ABD: Nondistended, soft.  EXTREMITIES: No lower extremity edema.   NEURO: non focal. Alert and oriented x self.   PSYCH: within normal limits. No depression or anxiety.  SKIN: warm dry intact     Labs:      Assessment/Plan:   Cognitive impairment with progressive confusion.  He was admitted with acute toxic encephalopathy.  Initial slums was 11/30;  CT did not show any acute findings and it has been recommended he should be in the TCU for rehab.   He is now independent with all his ADLs but remains confused  He also remains a high elopement risk as per staff and has  tried to get out of the building several times apparently he chewed his alarms and was found in the main floor unlocked unit trying to escape,  Currently on Exelon patch.    UTI-treated with antibiotics in the hospital    chronic kidney disease stage IV.      Congestive heart failure chronic with systolic dysfunction appear to be euvolemic in the hospital with no evidence of volume overload  decrease Lasix to 40 mg daily  A. fib continues on his anticoagulation monitor INRs  Currently rate controlled on Coreg along with amiodarone  Hypertension on multiple medications including Coreg, hydralazine  Diabetes type 2   He is currently on insulin along with sliding scale monitor his blood sugars.  He is also on a low-dose of glipizide.  Due to his refusal his blood sugar checks have been cut down to twice daily and he has been taken off insulin continue to monitor blood sugars.  Hyperlipidemia he is on simvastatin  Hypothyroidism on replacement Synthroid  BPH given Flomax  Depression  Anemia of chronic kidney disease   Nonischemic cardiomyopathy with an ejection fraction of 40% on Coreg  Mild aortic stenoses  Gait instability with progressive weakness felt to be partly due to medication noncompliance cognitive decline as well as progressive renal impairment  Patient has had stable mood and behaviors.  However cognitive testing results impaired scores with minimal recall noted on exam  In light of his failing in his previous independent living home environment with family support as well as complex medical as well as cognitive issues discharge planning was reviewed with .  We are requesting that he be discharged from assisted living memory care unit.  I am not going to sign his discharge back to his current independent living apartment with minimal supervision.  During his last discharge his wife had discharged him with hospice consultation on board due to progressive renal failure ,   ;nursing  will discuss discharge planning with family and discuss guardianship issues also  Total time spent was 35 minutes, more than half of it was in face-to-face counseling regarding disease state, treatment, side effects, documentation, review of clinical data and coordination of care    Electronically signed by: CARA Carey  This progress note was completed using Dragon software and there may be grammatical errors.

## 2021-06-16 NOTE — PROGRESS NOTES
[facility]      Facility:    [facility] [[id number]]  Code Status: DNR/DNI      Chief Complaint/Reason for Visit:  Chief Complaint   Patient presents with     Review Of Multiple Medical Conditions       HPI:   Scout is a 77 y.o. male who is recent transfer from [facility].  He has a past medical history of congestive heart failure with systolic dysfunction, diabetes, chronic kidney disease stage IV now 5 and hypertension who lives in his own home who was discharged from this TCU in Nov 2017.  He was brought to the ER by police with severe confusion on 2/4/18 and was discharged on 2/7/18 to the TCU.  Per wife he has been noncompliant with medications and has several violent outbursts, though nothing physical that is known. Historically his has ESRD and has refused dialysis.  Per nephrology his baseline creatinine is 3-4, with a level on 4.22 on admission.  On exam today and previously he is cooperative, no adverse behaviors noted. . Per our previous discussion discontinued his insulin and attempt to maintain some control of BS, which have been mostly controlled, continue to monitor.  Further he does not want any blood draws so INRs will be maintained in house. He states sleeping well so will discontinue trazodone, monitored sleep, will trial melatonin.     Patient denies pain, headache, chest pain, numbness or tingling, shortest of breath, eating or swallowing concerns, nausea or vomiting, diarrhea or bowel abnormalities, or no new integumentary concerns today. He does prefer female caretakers as there has been some behavior issues with male caretakers in the past.      Past Medical History:  Past Medical History:   Diagnosis Date     Aortic stenosis 5/4/2015     Benign Essential Hypertension     Created by Conversion      Chronic Kidney Disease, Stage 3     Created by Conversion      Diabetes With Renal Manifestations     Created by Conversion      Essential  Hypercholesterolemia     Created by Conversion      Type 2 Diabetes Mellitus     Created by Conversion      Vertigo     Created by Conversion            Surgical History:  Past Surgical History:   Procedure Laterality Date     CATARACT EXTRACTION         Family History:   Family History   Problem Relation Age of Onset     Heart disease Mother      Mental illness Father        Social History:    Social History     Social History     Marital status:      Spouse name: N/A     Number of children: N/A     Years of education: N/A     Social History Main Topics     Smoking status: Former Smoker     Quit date: 5/26/1977     Smokeless tobacco: Never Used     Alcohol use No     Drug use: No     Sexual activity: Not on file     Other Topics Concern     Not on file     Social History Narrative          Review of Systems   Constitutional: Positive for activity change and fatigue. Negative for appetite change, diaphoresis and fever.        No concerns   HENT: Negative for congestion, facial swelling and hearing loss.    Eyes: Negative for photophobia and visual disturbance.   Respiratory: Negative for apnea, shortness of breath and wheezing.    Cardiovascular: Negative for chest pain.   Gastrointestinal: Negative for abdominal distention, abdominal pain, blood in stool, constipation and diarrhea.   Endocrine: Positive for polyuria.   Genitourinary: Positive for frequency. Negative for dysuria.   Skin:        Intact   Allergic/Immunologic: Negative.    Neurological: Negative for dizziness, speech difficulty, weakness and light-headedness.   Hematological: Negative.    Psychiatric/Behavioral: Positive for confusion. Negative for agitation, hallucinations and sleep disturbance. The patient is not nervous/anxious.        Vitals:    02/14/18 1832   BP: 140/70   Pulse: 72   Resp: 18   Temp: 97  F (36.1  C)   SpO2: 93%   Weight: 175 lb (79.4 kg)       Physical Exam   Constitutional: He appears well-developed. No distress.    Refusing insulin   HENT:   Head: Normocephalic and atraumatic.   Mouth/Throat: Oropharynx is clear and moist. No oropharyngeal exudate.   Eyes: Pupils are equal, round, and reactive to light. Right eye exhibits no discharge. Left eye exhibits no discharge. No scleral icterus.   Neck: Normal range of motion. Neck supple. No JVD present.   Cardiovascular: Exam reveals no gallop and no friction rub.    Murmur heard.  Irregular rate and rhythm 2/6 CARMEN LSB   Pulmonary/Chest: Effort normal and breath sounds normal. No stridor. He has no wheezes. He has no rales.   Bibasilar crackles, RA   Abdominal: Soft. Bowel sounds are normal. He exhibits no distension. There is no tenderness.   No constipation or diarrhea   Genitourinary:   Genitourinary Comments: Deferred   Musculoskeletal: He exhibits no edema, tenderness or deformity.   Ambulate per self   Neurological: He is alert. No cranial nerve deficit.   Alert and oriented ×1, no focal deficits   Skin: Skin is warm and dry. He is not diaphoretic.   Intact   Psychiatric: He has a normal mood and affect.   No anxiety or depression, no behavioral issues, stubbord   Vitals reviewed.      Medication List:  Current Outpatient Prescriptions   Medication Sig     acetaminophen (TYLENOL) 500 MG tablet Take 1-2 tablets (500-1,000 mg total) by mouth every 6 (six) hours as needed.     amiodarone (PACERONE) 200 MG tablet Take 1 tablet (200 mg total) by mouth every other day.     carvedilol (COREG) 25 MG tablet Take 25 mg by mouth 2 (two) times a day with meals.     furosemide (LASIX) 40 MG tablet Take 1 tablet (40 mg total) by mouth 2 (two) times a day at 9am and 6pm. (Patient taking differently: Take 40 mg by mouth daily. )     glipiZIDE (GLUCOTROL XL) 2.5 MG 24 hr tablet Take 1 tablet by mouth once daily before breakfast     hydrALAZINE (APRESOLINE) 10 MG tablet Take 1 tablet (10 mg total) by mouth 4 (four) times a day.     isosorbide mononitrate (IMDUR) 30 MG 24 hr tablet Take 30 mg  by mouth daily.     levothyroxine (SYNTHROID, LEVOTHROID) 100 MCG tablet Take 100 mcg by mouth daily.     magnesium oxide (MAG-OX) 400 mg tablet Take 1 tablet (400 mg total) by mouth 2 (two) times a day.     multivit-min-FA-lycopen-lutein (CENTRUM SILVER) 0.4-300-250 mg-mcg-mcg tablet Take 1 tablet by mouth daily.     potassium chloride (KLOR-CON) 10 MEQ CR tablet Take 1 tablet (10 mEq total) by mouth daily. (Patient taking differently: Take 20 mEq by mouth daily. )     rivastigmine (EXELON) 4.6 mg/24 hr Place 1 patch on the skin daily.     tamsulosin (FLOMAX) 0.4 mg Cp24 Take 1 capsule (0.4 mg total) by mouth daily after supper.     triamcinolone (KENALOG) 0.5 % ointment Apply bid to affected area for 2-3 weeks (Patient taking differently: Apply 1 application topically 2 (two) times a day. Apply bid to affected area for 2-3 weeks)     warfarin (COUMADIN) 2.5 MG tablet 5 mg ( 2 tab)  2/7and 2/8  then 2.5 mg kathya with goal inr 2-3       Labs:  No results found for this or any previous visit (from the past 168 hour(s)).    Assessment/Plan:  1.  DM: refuses all insulin which will be d/c'd, monitor BS BID, 130-200s, monitor  2.  HTN: continue hydralazine , coreg, imdur and lasix  3.  CHF: Continue furosemide 40 mg BID, weight stable at 175lb, monitor  4.  Afib: INR 1.8 on coumadin 3.5mg, recheck on 2/16, on amiodarone for rate control  5.  ESRD: last Cr 4.22 with GFR 34, no HD  6.  Insomnia: refuses trazodone so will dc, sleeps 3-4hrs per night, start melatonin 6mg  7.  Hypomagnesia: last 1.5, mag oxide 400mg BID, monitor  8.  Hypokalemia: 10mEq daily, last 3.6  9.  Agitation: stable, none recorded, monitor  10: Elopement: in locked unit, no concern  11. Dementia: Exelon patch, no change  12. Hypothyroidism: last TSH 3.37 on 11/17    The care plan has been reviewed and all orders signed. Changes to care plan, if any, as noted. Otherwise, continue care plan of care.  The total time spent with this patient was greater  than 40 minutes, with greater than 50% spent in counseling and coordination of care that included multiple issues per visit with wife and SW per disposition and treatment plan.     Electronically signed by: Reji Dougherty NP

## 2021-06-16 NOTE — ANESTHESIA CARE TRANSFER NOTE
Last vitals:   Vitals:    03/25/18 1720   BP: 121/58   Pulse: 60   Resp:    Temp: 37  C (98.6  F)   SpO2: 100%     Patient's level of consciousness is drowsy  Spontaneous respirations: yes  Maintains airway independently: yes  Dentition unchanged: yes  Oropharynx: oropharynx clear of all foreign objects    QCDR Measures:  ASA# 20 - Surgical Safety Checklist: WHO surgical safety checklist completed prior to induction  PQRS# 430 - Adult PONV Prevention: 4558F - Pt received => 2 anti-emetic agents (different classes) preop & intraop  ASA# 8 - Peds PONV Prevention: NA - Not pediatric patient, not GA or 2 or more risk factors NOT present  PQRS# 424 - Hattie-op Temp Management: 4559F - At least one body temp DOCUMENTED => 35.5C or 95.9F within required timeframe  PQRS# 426 - PACU Transfer Protocol: - Transfer of care checklist used  ASA# 14 - Acute Post-op Pain: ASA14B - Patient did NOT experience pain >= 7 out of 10   Pt breathing spontaneously, suctioned extubated. Spontaneous respirations with SFM to PACU, VSS

## 2021-06-16 NOTE — PROGRESS NOTES
[facility]      Facility:    [facility] [[id number]]  Code Status: DNR/DNI      Chief Complaint/Reason for Visit:  Chief Complaint   Patient presents with     Review Of Multiple Medical Conditions       HPI:   Scout is a 77 y.o. male who is recent transfer from [facility].  He has a past medical history of congestive heart failure with systolic dysfunction, diabetes, chronic kidney disease stage IV now 5 and hypertension who lives in his own home who was discharged from this TCU in Nov 2017.  He was brought to the ER by police with severe confusion on 2/4/18 and was discharged on 2/7/18 to the TCU.  Per wife he has been noncompliant with medications and has several violent outbursts, though nothing physical that is known. Historically his has ESRD and has refused dialysis.  Per nephrology his baseline creatinine is 3-4, with a level on 4.22 on admission.  On exam today and previously he is cooperative, no adverse behaviors noted. . Per our previous discussion discontinued his insulin and attempt to maintain some control of BS, which have been mostly controlled, continue to monitor.  Further he does not want any blood draws so INRs will be maintained in house. He states sleeping well so will discontinue trazodone, monitored sleep, will continue melatonin. Coumadin being held per supra therapeutic INR.    Patient denies pain, headache, chest pain, numbness or tingling, shortest of breath, eating or swallowing concerns, nausea or vomiting, diarrhea or bowel abnormalities, or no new integumentary concerns today. He does prefer female caretakers as there has been some behavior issues with male caretakers in the past.      Past Medical History:  Past Medical History:   Diagnosis Date     Aortic stenosis 5/4/2015     Benign Essential Hypertension     Created by Conversion      Chronic Kidney Disease, Stage 3     Created by Conversion      Diabetes With Renal  Manifestations     Created by Conversion      Essential Hypercholesterolemia     Created by Conversion      Type 2 Diabetes Mellitus     Created by Conversion      Vertigo     Created by Conversion            Surgical History:  Past Surgical History:   Procedure Laterality Date     CATARACT EXTRACTION         Family History:   Family History   Problem Relation Age of Onset     Heart disease Mother      Mental illness Father        Social History:    Social History     Social History     Marital status:      Spouse name: N/A     Number of children: N/A     Years of education: N/A     Social History Main Topics     Smoking status: Former Smoker     Quit date: 5/26/1977     Smokeless tobacco: Never Used     Alcohol use No     Drug use: No     Sexual activity: Not on file     Other Topics Concern     Not on file     Social History Narrative          Review of Systems   Constitutional: Positive for activity change and fatigue. Negative for appetite change, diaphoresis and fever.        No concerns   HENT: Negative for congestion, facial swelling and hearing loss.    Eyes: Negative for photophobia and visual disturbance.   Respiratory: Negative for apnea, shortness of breath and wheezing.    Cardiovascular: Negative for chest pain.   Gastrointestinal: Negative for abdominal distention, abdominal pain, blood in stool, constipation and diarrhea.   Endocrine: Positive for polyuria.   Genitourinary: Positive for frequency. Negative for dysuria.   Skin:        Intact   Allergic/Immunologic: Negative.    Neurological: Negative for dizziness, speech difficulty, weakness and light-headedness.   Hematological: Negative.    Psychiatric/Behavioral: Positive for confusion. Negative for agitation, hallucinations and sleep disturbance. The patient is not nervous/anxious.        Vitals:    02/20/18 1211   BP: 126/64   Pulse: 64   Resp: 18   Temp: 97.8  F (36.6  C)   SpO2: 97%   Weight: 175 lb (79.4 kg)       Physical Exam    Constitutional: He appears well-developed. No distress.   Refusing insulin   HENT:   Head: Normocephalic and atraumatic.   Mouth/Throat: Oropharynx is clear and moist. No oropharyngeal exudate.   Eyes: Pupils are equal, round, and reactive to light. Right eye exhibits no discharge. Left eye exhibits no discharge. No scleral icterus.   Neck: Normal range of motion. Neck supple. No JVD present.   Cardiovascular: Exam reveals no gallop and no friction rub.    Murmur heard.  Irregular rate and rhythm 2/6 CARMEN LSB   Pulmonary/Chest: Effort normal and breath sounds normal. No stridor. He has no wheezes. He has no rales.   Bibasilar crackles, RA   Abdominal: Soft. Bowel sounds are normal. He exhibits no distension. There is no tenderness.   No constipation or diarrhea   Genitourinary:   Genitourinary Comments: Deferred   Musculoskeletal: He exhibits no edema, tenderness or deformity.   Ambulate per self   Neurological: He is alert. No cranial nerve deficit.   Alert and oriented ×1, no focal deficits   Skin: Skin is warm and dry. He is not diaphoretic.   Intact   Psychiatric: He has a normal mood and affect.   No anxiety or depression, no behavioral issues, stubbord   Vitals reviewed.      Medication List:  Current Outpatient Prescriptions   Medication Sig     acetaminophen (TYLENOL) 500 MG tablet Take 1-2 tablets (500-1,000 mg total) by mouth every 6 (six) hours as needed.     amiodarone (PACERONE) 200 MG tablet Take 1 tablet (200 mg total) by mouth every other day.     carvedilol (COREG) 25 MG tablet Take 25 mg by mouth 2 (two) times a day with meals.     furosemide (LASIX) 40 MG tablet Take 1 tablet (40 mg total) by mouth 2 (two) times a day at 9am and 6pm. (Patient taking differently: Take 40 mg by mouth daily. )     glipiZIDE (GLUCOTROL XL) 2.5 MG 24 hr tablet Take 1 tablet by mouth once daily before breakfast     hydrALAZINE (APRESOLINE) 10 MG tablet Take 1 tablet (10 mg total) by mouth 4 (four) times a day.      isosorbide mononitrate (IMDUR) 30 MG 24 hr tablet Take 30 mg by mouth daily.     levothyroxine (SYNTHROID, LEVOTHROID) 100 MCG tablet Take 100 mcg by mouth daily.     magnesium oxide (MAG-OX) 400 mg tablet Take 1 tablet (400 mg total) by mouth 2 (two) times a day.     melatonin 3 mg Tab tablet Take 6 mg by mouth at bedtime.     multivit-min-FA-lycopen-lutein (CENTRUM SILVER) 0.4-300-250 mg-mcg-mcg tablet Take 1 tablet by mouth daily.     potassium chloride (KLOR-CON) 10 MEQ CR tablet Take 1 tablet (10 mEq total) by mouth daily. (Patient taking differently: Take 20 mEq by mouth daily. )     rivastigmine (EXELON) 4.6 mg/24 hr Place 1 patch on the skin daily.     tamsulosin (FLOMAX) 0.4 mg Cp24 Take 1 capsule (0.4 mg total) by mouth daily after supper.     triamcinolone (KENALOG) 0.5 % ointment Apply bid to affected area for 2-3 weeks (Patient taking differently: Apply 1 application topically 2 (two) times a day. Apply bid to affected area for 2-3 weeks)     WARFARIN SODIUM (WARFARIN ORAL) Take by mouth. 2/15/18 INR 2.1(fingerstick)  Continue taking 4mg daily.  Next INR 2/19/18.       Labs:  No results found for this or any previous visit (from the past 168 hour(s)).    Assessment/Plan:  1.  DM: refuses all insulin which will be d/c'd, monitor BS BID, 80-200s, monitor  2.  HTN: continue hydralazine, coreg, imdur and lasix  3.  CHF: Continue furosemide 40 mg BID, weight stable at 175lb, monitor  4.  Afib: INR 4.6, currently coumadin on hold, recheck on 2/22, on amiodarone for rate control  5.  ESRD: last Cr 4.22 with GFR 34, no HD  6.  Insomnia: refuses trazodone so will dc, sleeps 3-4hrs per night, start melatonin 6mg, partially effective  7.  Hypomagnesia: last 1.5, mag oxide 400mg BID, monitor  8.  Hypokalemia: 10mEq daily, last 3.6  9.  Agitation: stable, none recorded, monitor  10: Elopement: in locked unit, no concern  11. Dementia: Exelon patch, no change  12. Hypothyroidism: last TSH 3.37 on 11/17    The care  plan has been reviewed and all orders signed. Changes to care plan, if any, as noted. Otherwise, continue care plan of care.       Electronically signed by: Reji Dougherty NP

## 2021-06-17 NOTE — PROGRESS NOTES
[facility]      Facility:    [facility] [[id number]]  Code Status: DNR/DNI      Chief Complaint/Reason for Visit:  Chief Complaint   Patient presents with     Problem Visit       HPI:   Scout is a 77 y.o. male who is recent transfer from [facility].  He has a past medical history of congestive heart failure with systolic dysfunction, diabetes, chronic kidney disease stage IV now 5 and hypertension who lives in his own home who was discharged from this TCU in Nov 2017.  He was brought to the ER by police with severe confusion on 2/4/18 and was discharged on 2/7/18 to the TCU.  Per wife he has been noncompliant with medications and has several violent outbursts, though nothing physical that is known. Historically his has ESRD and has refused dialysis.  Per nephrology his baseline creatinine is 3-4, with a level on 4.22 on admission.  On exam today and previously he is cooperative, no adverse behaviors noted. . Per our previous discussion discontinued his insulin and attempt to maintain some control of BS, which have been mostly controlled, continue to monitor.  Further he does not want any blood draws so INRs will be maintained in house. He states sleeping well so will discontinue trazodone, monitored sleep, will continue melatonin and start olanzapine at HS.    Recent hospitalization:  Patient was sent to [facility] per finding him on the floor. Per imaging was found to have L hip fx, initial INR elevated, given FFP per surgery. Also Cr was 6+, stopped lasix and K supplementation. Was admitted on 3/24/18 and discharged on 3/28/18. He has a f/u with [facility] as directed. DEXA scan pending.    Patient denies pain, denies headache, chest pain, numbness or tingling, shortest of breath, eating or swallowing concerns, nausea or vomiting, diarrhea or bowel abnormalities, or no new integumentary concerns today.  Will decrease tramadol, monitor for urinary  retention, and dc ASA. Pain more controlled, will decrease tramadol and tylenol.     Past Medical History:  Past Medical History:   Diagnosis Date     Acute embolic stroke      Aortic stenosis 5/4/2015     Aortic stenosis      Benign Essential Hypertension     Created by Conversion      Chronic Kidney Disease, Stage 3     Created by Conversion      Dementia      Depression      Diabetes mellitus, type II      Diabetes With Renal Manifestations     Created by Conversion      Essential Hypercholesterolemia     Created by Conversion      Type 2 Diabetes Mellitus     Created by Conversion      Vertigo     Created by Conversion            Surgical History:  Past Surgical History:   Procedure Laterality Date     CATARACT EXTRACTION       HIP PINNING Left 3/25/2018    Procedure: LEFT HIP OPEN REDUCTION INTERNAL FIXATION;  Surgeon: Gamaliel Reeder MD;  Location: [facility];  Service:        Family History:   Family History   Problem Relation Age of Onset     Heart disease Mother      Mental illness Father        Social History:    Social History     Social History     Marital status:      Spouse name: N/A     Number of children: N/A     Years of education: N/A     Social History Main Topics     Smoking status: Former Smoker     Quit date: 5/26/1977     Smokeless tobacco: Never Used     Alcohol use No     Drug use: No     Sexual activity: Not on file     Other Topics Concern     Not on file     Social History Narrative          Review of Systems   Constitutional: Negative for activity change, appetite change, diaphoresis, fatigue and fever.        New L hip fx - ongoing therapy   HENT: Negative for congestion, facial swelling and hearing loss.    Eyes: Negative for photophobia, pain and visual disturbance.   Respiratory: Negative for apnea, shortness of breath and wheezing.    Cardiovascular: Negative for chest pain.   Gastrointestinal: Negative for abdominal distention, abdominal pain, blood in stool,  constipation and diarrhea.   Endocrine: Positive for polyuria.   Genitourinary: Positive for frequency. Negative for dysuria.        Retention   Musculoskeletal:        Pain with movement in L leg - improved bed mobility/therapy   Skin:        L hip has drsg   Allergic/Immunologic: Negative.    Neurological: Negative for dizziness, speech difficulty, weakness and light-headedness.   Hematological: Negative.    Psychiatric/Behavioral: Positive for confusion. Negative for agitation, hallucinations and sleep disturbance. The patient is not nervous/anxious.        There were no vitals filed for this visit.    Physical Exam   Constitutional: He appears well-developed. No distress.   Pain control per recent L hip fx - improved   HENT:   Head: Normocephalic and atraumatic.   Mouth/Throat: Oropharynx is clear and moist. No oropharyngeal exudate.   Eyes: Pupils are equal, round, and reactive to light. Right eye exhibits no discharge. Left eye exhibits no discharge. No scleral icterus.   Neck: Normal range of motion. Neck supple. No JVD present.   Cardiovascular: Exam reveals no gallop and no friction rub.    Murmur heard.  Reg, 2/6 CARMEN LSB   Pulmonary/Chest: Effort normal and breath sounds normal. No stridor. He has no wheezes. He has no rales.   Dim, RA   Abdominal: Soft. Bowel sounds are normal. He exhibits no distension. There is no tenderness.   No constipation or diarrhea   Genitourinary:   Genitourinary Comments: Deferred   Musculoskeletal: He exhibits no edema, tenderness or deformity.   Pain with movement, receiving diatherapy, decrease schedule tramadol x1 wk and decrease tylenol 1000mg to TID   Neurological: He is alert. No cranial nerve deficit.   Alert and oriented ×1, no focal deficits   Skin: Skin is warm and dry. He is not diaphoretic.   L hip, drsg C/D/I   Psychiatric: He has a normal mood and affect.   No anxiety or depression, no behavioral issues, stubbord   Vitals reviewed.      Medication List:  Current  Outpatient Prescriptions   Medication Sig     acetaminophen (TYLENOL) 500 MG tablet Take 1,000 mg by mouth 3 (three) times a day.     amiodarone (PACERONE) 200 MG tablet Take 1 tablet (200 mg total) by mouth every other day.     carvedilol (COREG) 25 MG tablet Take 25 mg by mouth 2 (two) times a day with meals. Hold if SBP <120 or pulse <60     docusate sodium (COLACE) 100 MG capsule Take 1 capsule (100 mg total) by mouth 2 (two) times a day.     ferrous sulfate 325 (65 FE) MG tablet Take 1 tablet by mouth daily with breakfast.     hydrALAZINE (APRESOLINE) 10 MG tablet Take 1 tablet (10 mg total) by mouth 4 (four) times a day.     isosorbide mononitrate (IMDUR) 30 MG 24 hr tablet Take 30 mg by mouth daily.     levothyroxine (SYNTHROID, LEVOTHROID) 100 MCG tablet Take 100 mcg by mouth daily.     magnesium oxide (MAG-OX) 400 mg tablet Take 400 mg by mouth daily.     melatonin 3 mg Tab tablet Take 6 mg by mouth at bedtime.     OLANZapine (ZYPREXA) 2.5 MG tablet Take 2.5 mg by mouth at bedtime.     polyethylene glycol (MIRALAX) 17 gram packet Take 1 packet (17 g total) by mouth 2 (two) times a day.     rivastigmine (EXELON) 4.6 mg/24 hr Place 1 patch on the skin daily.     senna-docusate (PERICOLACE) 8.6-50 mg tablet Take 1 tablet by mouth 2 (two) times a day.     tamsulosin (FLOMAX) 0.4 mg Cp24 Take 1 capsule (0.4 mg total) by mouth 2 (two) times a day.     traMADol (ULTRAM) 50 mg tablet Take 25 mg by mouth 3 (three) times a day. Ok for 1 additional tab daily      triamcinolone (KENALOG) 0.5 % ointment Apply 1 application topically 2 (two) times a day as needed.      WARFARIN SODIUM (WARFARIN DAILY DOSE) Take by mouth daily with supper. 4/2/18 INR 5.0 Hold 2 & 3, next INR 4/4.  3/31/18 3.37 2.5mg qd  3/28/17 2.42 2.5mg qd.  3/27/18 INR 1.73 had 3.5mg  3/26/18 had 3.5mg  3/25/18 had 5mg       Labs:  No results found for this or any previous visit (from the past 168 hour(s)).    Assessment/Plan:  1.  DM: refuses all  insulin which will be d/c'd, monitor BS BID, 80-200s, previously d/c'd oral.  2.  HTN: continue hydralazine, coreg and imdur, BP elevated. Monitor.  3.  CHF: recently lasix was d/c'd, no recent weight monitoring, monitor.  4.  Afib: INR therapeutic. Continue amiodarone for rate control.  5.  ESRD: last Cr 4.81 with GFR 12, no HD.  6.  Insomnia: refuses trazodone so will dc, sleeps 3-4hrs per night, continue melatonin 6mg, continue zypreza 2.5mg at HS, effective.  7.  Hypomagnesia: last 2.5, decrease mag oxide 400mg daily, monitor.  8.  Hypokalemia: recently d/c'd lasix, recheck BMP.  9.  Agitation: stable, none recorded, monitor.  10: Elopement: in locked unit, attempted recently though has decreased per hip fx.  11. Dementia: Exelon patch, no change.  12. Hypothyroidism: last TSH 3.37 on 11/17.  13. ABLA: last Hgb 9.2, recheck CBC on 4/2. Adequate stores.  14. Pain management: dc ASA, decrease tramadol to 25mg TID for 1 wk then dc, also decreased tylenol to 1000mg TID, monitor response.  15. L hip fx: f/u with ortho as directed, pending DEXA scan.    The care plan has been reviewed and all orders signed. Changes to care plan, if any, as noted. Otherwise, continue care plan of care.       Electronically signed by: Reji Dougherty NP

## 2021-06-17 NOTE — PROGRESS NOTES
[facility]      Facility:    [facility] [[id number]]  Code Status: DNR/DNI      Chief Complaint/Reason for Visit:  Chief Complaint   Patient presents with     Problem Visit       HPI:   Scout is a 77 y.o. male who is recent transfer from [facility].  He has a past medical history of congestive heart failure with systolic dysfunction, diabetes, chronic kidney disease stage IV now 5 and hypertension who lives in his own home who was discharged from this TCU in Nov 2017.  He was brought to the ER by police with severe confusion on 2/4/18 and was discharged on 2/7/18 to the TCU.  Per wife he has been noncompliant with medications and has several violent outbursts, though nothing physical that is known. Historically his has ESRD and has refused dialysis.  Per nephrology his baseline creatinine is 3-4, with a level on 4.22 on admission.  On exam today and previously he is cooperative, no adverse behaviors noted. . Per our previous discussion discontinued his insulin and attempt to maintain some control of BS, which have been mostly controlled, continue to monitor.  Further he does not want any blood draws so INRs will be maintained in house. He states sleeping well so will discontinue trazodone, monitored sleep, will continue melatonin and start olanzapine at HS.    Recent hospitalization:  Patient was sent to [facility] per finding him on the floor. Per imaging was found to have L hip fx, initial INR elevated, given FFP per surgery. Also Cr was 6+, stopped lasix and K supplementation. Was admitted on 3/24/18 and discharged on 3/28/18. He has a f/u with [facility] in 2 wks and has Dexa scan scheduled.      Patient denies pain though trouble with bed mobility and ambulation, denies headache, chest pain, numbness or tingling, shortest of breath, eating or swallowing concerns, nausea or vomiting, diarrhea or bowel abnormalities, or no new integumentary  concerns today.  Will start low dose tramadol, monitor for urinary retention, dc ASA, and check BMP/CBC in 1 wk.    Past Medical History:  Past Medical History:   Diagnosis Date     Acute embolic stroke      Aortic stenosis 5/4/2015     Aortic stenosis      Benign Essential Hypertension     Created by Conversion      Chronic Kidney Disease, Stage 3     Created by Conversion      Dementia      Depression      Diabetes mellitus, type II      Diabetes With Renal Manifestations     Created by Conversion      Essential Hypercholesterolemia     Created by Conversion      Type 2 Diabetes Mellitus     Created by Conversion      Vertigo     Created by Conversion            Surgical History:  Past Surgical History:   Procedure Laterality Date     CATARACT EXTRACTION       HIP PINNING Left 3/25/2018    Procedure: LEFT HIP OPEN REDUCTION INTERNAL FIXATION;  Surgeon: Gamaliel Reeder MD;  Location: [facility];  Service:        Family History:   Family History   Problem Relation Age of Onset     Heart disease Mother      Mental illness Father        Social History:    Social History     Social History     Marital status:      Spouse name: N/A     Number of children: N/A     Years of education: N/A     Social History Main Topics     Smoking status: Former Smoker     Quit date: 5/26/1977     Smokeless tobacco: Never Used     Alcohol use No     Drug use: No     Sexual activity: Not on file     Other Topics Concern     Not on file     Social History Narrative          Review of Systems   Constitutional: Positive for activity change. Negative for appetite change, diaphoresis, fatigue and fever.        New L hip fx   HENT: Negative for congestion, facial swelling and hearing loss.    Eyes: Negative for photophobia, pain and visual disturbance.   Respiratory: Negative for apnea, shortness of breath and wheezing.    Cardiovascular: Negative for chest pain.   Gastrointestinal: Negative for abdominal distention, abdominal  pain, blood in stool, constipation and diarrhea.   Endocrine: Positive for polyuria.   Genitourinary: Positive for frequency. Negative for dysuria.        Retention   Musculoskeletal:        Pain with movement in L leg   Skin:        L hip has drsg   Allergic/Immunologic: Negative.    Neurological: Negative for dizziness, speech difficulty, weakness and light-headedness.   Hematological: Negative.    Psychiatric/Behavioral: Positive for confusion. Negative for agitation, hallucinations and sleep disturbance. The patient is not nervous/anxious.        Vitals:    03/29/18 1027   BP: 159/78   Pulse: 79   Resp: 18   Temp: 98.4  F (36.9  C)   SpO2: 94%   Weight: 175 lb (79.4 kg)       Physical Exam   Constitutional: He appears well-developed. No distress.   Pain control per recent L hip fx   HENT:   Head: Normocephalic and atraumatic.   Mouth/Throat: Oropharynx is clear and moist. No oropharyngeal exudate.   Eyes: Pupils are equal, round, and reactive to light. Right eye exhibits no discharge. Left eye exhibits no discharge. No scleral icterus.   Neck: Normal range of motion. Neck supple. No JVD present.   Cardiovascular: Exam reveals no gallop and no friction rub.    Murmur heard.  Reg, 2/6 CARMEN LSB   Pulmonary/Chest: Effort normal and breath sounds normal. No stridor. He has no wheezes. He has no rales.   Dim, RA   Abdominal: Soft. Bowel sounds are normal. He exhibits no distension. There is no tenderness.   No constipation or diarrhea   Genitourinary:   Genitourinary Comments: Deferred   Musculoskeletal: He exhibits no edema, tenderness or deformity.   Pain with movement, receiving diatherapy, schedule tramadol   Neurological: He is alert. No cranial nerve deficit.   Alert and oriented ×1, no focal deficits   Skin: Skin is warm and dry. He is not diaphoretic.   L hip, drsg C/D/I   Psychiatric: He has a normal mood and affect.   No anxiety or depression, no behavioral issues, stubbord   Vitals reviewed.      Medication  List:  Current Outpatient Prescriptions   Medication Sig     traMADol (ULTRAM) 50 mg tablet Take 50 mg by mouth 3 (three) times a day. Ok for 1 additional tab daily     acetaminophen (TYLENOL) 325 MG tablet Take 2 tablets (650 mg total) by mouth 4 (four) times a day.     acetaminophen (TYLENOL) 325 MG tablet Take 1 tablet (325 mg total) by mouth every 4 (four) hours as needed.     amiodarone (PACERONE) 200 MG tablet Take 1 tablet (200 mg total) by mouth every other day.     carvedilol (COREG) 25 MG tablet Take 25 mg by mouth 2 (two) times a day with meals. Hold if SBP <120 or pulse <60     docusate sodium (COLACE) 100 MG capsule Take 1 capsule (100 mg total) by mouth 2 (two) times a day.     glipiZIDE (GLUCOTROL XL) 2.5 MG 24 hr tablet Take 2.5 mg by mouth daily.     hydrALAZINE (APRESOLINE) 10 MG tablet Take 1 tablet (10 mg total) by mouth 4 (four) times a day.     isosorbide mononitrate (IMDUR) 30 MG 24 hr tablet Take 30 mg by mouth daily.     levothyroxine (SYNTHROID, LEVOTHROID) 100 MCG tablet Take 100 mcg by mouth daily.     magnesium oxide (MAG-OX) 400 mg tablet Take 400 mg by mouth daily.     melatonin 3 mg Tab tablet Take 6 mg by mouth at bedtime.     multivit-min-FA-lycopen-lutein (CENTRUM SILVER) 0.4-300-250 mg-mcg-mcg tablet Take 1 tablet by mouth daily.     OLANZapine (ZYPREXA) 2.5 MG tablet Take 2.5 mg by mouth at bedtime.     polyethylene glycol (MIRALAX) 17 gram packet Take 1 packet (17 g total) by mouth 2 (two) times a day.     rivastigmine (EXELON) 4.6 mg/24 hr Place 1 patch on the skin daily.     senna-docusate (PERICOLACE) 8.6-50 mg tablet Take 1 tablet by mouth 2 (two) times a day.     tamsulosin (FLOMAX) 0.4 mg Cp24 Take 1 capsule (0.4 mg total) by mouth 2 (two) times a day.     triamcinolone (KENALOG) 0.5 % ointment Apply 1 application topically 2 (two) times a day as needed.      warfarin (COUMADIN) 2.5 MG tablet 2.5 mg q day and adjust per INR (Patient taking differently: 2.25 mg. 2.5 mg q  day and adjust per INR)       Labs:  Recent Results (from the past 168 hour(s))   ECG 12 lead nursing unit performed    Collection Time: 03/24/18 11:49 AM   Result Value Ref Range    SYSTOLIC BLOOD PRESSURE 144 mmHg    DIASTOLIC BLOOD PRESSURE 71 mmHg    VENTRICULAR RATE 59 BPM    ATRIAL RATE 59 BPM    P-R INTERVAL  ms    QRS DURATION 132 ms    Q-T INTERVAL 482 ms    QTC CALCULATION (BEZET) 477 ms    P Axis  degrees    R AXIS -61 degrees    T AXIS -15 degrees    MUSE DIAGNOSIS       Sinus bradycardia  Left bundle branch block  Abnormal ECG  When compared with ECG of 04-FEB-2018 03:38,    Vent. rate has decreased BY  30 BPM  Confirmed by LOU CERVANTES MD LOC: ([id number]) on 3/25/2018 3:31:56 PM     Troponin I    Collection Time: 03/24/18 11:56 AM   Result Value Ref Range    Troponin I 0.04 0.00 - 0.29 ng/mL   INR    Collection Time: 03/24/18 11:56 AM   Result Value Ref Range    INR 6.49 (HH) 0.90 - 1.10   APTT    Collection Time: 03/24/18 11:56 AM   Result Value Ref Range    PTT 66 (H) 24 - 37 seconds   Comprehensive Metabolic Panel    Collection Time: 03/24/18 11:56 AM   Result Value Ref Range    Sodium 138 136 - 145 mmol/L    Potassium 4.1 3.5 - 5.0 mmol/L    Chloride 107 98 - 107 mmol/L    CO2 21 (L) 22 - 31 mmol/L    Anion Gap, Calculation 10 5 - 18 mmol/L    Glucose 93 70 - 125 mg/dL    BUN 48 (H) 8 - 28 mg/dL    Creatinine 6.00 (H) 0.70 - 1.30 mg/dL    GFR MDRD Af Amer 11 (L) >60 mL/min/1.73m2    GFR MDRD Non Af Amer 9 (L) >60 mL/min/1.73m2    Bilirubin, Total 0.5 0.0 - 1.0 mg/dL    Calcium 8.7 8.5 - 10.5 mg/dL    Protein, Total 7.5 6.0 - 8.0 g/dL    Albumin 3.1 (L) 3.5 - 5.0 g/dL    Alkaline Phosphatase 84 45 - 120 U/L    AST 21 0 - 40 U/L    ALT 17 0 - 45 U/L   Magnesium    Collection Time: 03/24/18 11:56 AM   Result Value Ref Range    Magnesium 2.1 1.8 - 2.6 mg/dL   HM1 (CBC with Diff)    Collection Time: 03/24/18 11:56 AM   Result Value Ref Range    WBC 8.8 4.0 - 11.0 thou/uL    RBC 3.53 (L) 4.40 - 6.20  mill/uL    Hemoglobin 11.1 (L) 14.0 - 18.0 g/dL    Hematocrit 33.2 (L) 40.0 - 54.0 %    MCV 94 80 - 100 fL    MCH 31.4 27.0 - 34.0 pg    MCHC 33.4 32.0 - 36.0 g/dL    RDW 13.3 11.0 - 14.5 %    Platelets 214 140 - 440 thou/uL    MPV 8.4 (L) 8.5 - 12.5 fL    Neutrophils % 70 50 - 70 %    Lymphocytes % 17 (L) 20 - 40 %    Monocytes % 9 2 - 10 %    Eosinophils % 3 0 - 6 %    Basophils % 1 0 - 2 %    Neutrophils Absolute 6.1 2.0 - 7.7 thou/uL    Lymphocytes Absolute 1.5 0.8 - 4.4 thou/uL    Monocytes Absolute 0.8 0.0 - 0.9 thou/uL    Eosinophils Absolute 0.3 0.0 - 0.4 thou/uL    Basophils Absolute 0.0 0.0 - 0.2 thou/uL   Urinalysis-UC if Indicated    Collection Time: 03/24/18  1:36 PM   Result Value Ref Range    Color, UA Yellow Colorless, Yellow, Straw, Light Yellow    Clarity, UA Clear Clear    Glucose, UA Negative Negative    Bilirubin, UA Negative Negative    Ketones, UA Negative Negative    Specific Gravity, UA 1.005 1.001 - 1.030    Blood, UA Moderate (!) Negative    pH, UA 5.0 4.5 - 8.0    Protein, UA 30 mg/dL (!) Negative mg/dL    Urobilinogen, UA <2.0 E.U./dL <2.0 E.U./dL, 2.0 E.U./dL    Nitrite, UA Negative Negative    Leukocytes, UA Small (!) Negative    Bacteria, UA Few (!) None Seen hpf    RBC, UA 5-10 (!) None Seen, 0-2 hpf    WBC, UA 5-10 (!) None Seen, 0-5 hpf    Squam Epithel, UA 0-5 None Seen, 0-5 lpf    Mucus, UA Few (!) None Seen lpf   Culture, Urine    Collection Time: 03/24/18  1:36 PM   Result Value Ref Range    Culture >100,000 col/ml Coagulase negative Staphylococcus (!)        Susceptibility    Coagulase negative Staphylococcus - JA     Cefazolin <=2 Sensitive      Doxycycline <=0.5 Sensitive      Nitrofurantoin <=16 Sensitive      Levofloxacin <=0.5 Sensitive      Oxacillin <=0.25 Sensitive      Vancomycin 2 Sensitive    Type and Screen    Collection Time: 03/24/18  2:30 PM   Result Value Ref Range    ABORh O NEG     Antibody Screen Negative Negative   Protime-INR (if on Coumadin)     Collection Time: 03/24/18  3:16 PM   Result Value Ref Range    INR 4.84 (H) 0.90 - 1.10   APTT  (if on Coumadin)    Collection Time: 03/24/18  3:16 PM   Result Value Ref Range    PTT 60 (H) 24 - 37 seconds   POCT Glucose    Collection Time: 03/24/18  9:08 PM   Result Value Ref Range    Glucose,  mg/dL   Protime-INR    Collection Time: 03/24/18  9:14 PM   Result Value Ref Range    INR 1.74 (H) 0.90 - 1.10   Basic metabolic panel (If not done in ED)    Collection Time: 03/25/18  4:55 AM   Result Value Ref Range    Sodium 137 136 - 145 mmol/L    Potassium 3.8 3.5 - 5.0 mmol/L    Chloride 107 98 - 107 mmol/L    CO2 22 22 - 31 mmol/L    Anion Gap, Calculation 8 5 - 18 mmol/L    Glucose 97 70 - 125 mg/dL    Calcium 8.1 (L) 8.5 - 10.5 mg/dL    BUN 44 (H) 8 - 28 mg/dL    Creatinine 5.35 (H) 0.70 - 1.30 mg/dL    GFR MDRD Af Amer 13 (L) >60 mL/min/1.73m2    GFR MDRD Non Af Amer 10 (L) >60 mL/min/1.73m2   Protime-INR (if on Coumadin)    Collection Time: 03/25/18  4:55 AM   Result Value Ref Range    INR 1.48 (H) 0.90 - 1.10   APTT  (if on Coumadin)    Collection Time: 03/25/18  4:55 AM   Result Value Ref Range    PTT 48 (H) 24 - 37 seconds   Magnesium    Collection Time: 03/25/18  4:55 AM   Result Value Ref Range    Magnesium 1.7 (L) 1.8 - 2.6 mg/dL   HM1 (CBC with Diff)    Collection Time: 03/25/18  4:55 AM   Result Value Ref Range    WBC 11.8 (H) 4.0 - 11.0 thou/uL    RBC 3.03 (L) 4.40 - 6.20 mill/uL    Hemoglobin 9.4 (L) 14.0 - 18.0 g/dL    Hematocrit 28.3 (L) 40.0 - 54.0 %    MCV 93 80 - 100 fL    MCH 31.0 27.0 - 34.0 pg    MCHC 33.2 32.0 - 36.0 g/dL    RDW 13.0 11.0 - 14.5 %    Platelets 157 140 - 440 thou/uL    MPV 8.3 (L) 8.5 - 12.5 fL    Neutrophils % 76 (H) 50 - 70 %    Lymphocytes % 11 (L) 20 - 40 %    Monocytes % 10 2 - 10 %    Eosinophils % 3 0 - 6 %    Basophils % 0 0 - 2 %    Neutrophils Absolute 9.0 (H) 2.0 - 7.7 thou/uL    Lymphocytes Absolute 1.3 0.8 - 4.4 thou/uL    Monocytes Absolute 1.2 (H) 0.0 - 0.9  thou/uL    Eosinophils Absolute 0.3 0.0 - 0.4 thou/uL    Basophils Absolute 0.0 0.0 - 0.2 thou/uL   Protime-INR    Collection Time: 03/25/18  9:09 AM   Result Value Ref Range    INR 1.48 (H) 0.90 - 1.10   Protime-INR    Collection Time: 03/25/18  1:10 PM   Result Value Ref Range    INR 1.46 (H) 0.90 - 1.10   POCT Glucose    Collection Time: 03/25/18  5:41 PM   Result Value Ref Range    Glucose,  mg/dL   Potassium    Collection Time: 03/25/18  7:14 PM   Result Value Ref Range    Potassium 4.6 3.5 - 5.0 mmol/L   Plasma Product Information    Collection Time: 03/26/18 12:02 AM   Result Value Ref Range    Unit Type O Pos     Blood Expiration Date 95720375752895     Unit Number [id number]     Status Transfused     Component FROZEN PLASMA     PRODUCT CODE G3492I18     Issue Date and Time [id number]     Blood Type 5100     CODING SYSTEM [id number]    Plasma Product Information    Collection Time: 03/26/18 12:02 AM   Result Value Ref Range    Unit Type O Pos     Blood Expiration Date 94051158422634     Unit Number [id number]     Status Transfused     Component FROZEN PLASMA     PRODUCT CODE [id number]     Issue Date and Time [id number]     Blood Type 5100     CODING SYSTEM [id number]    Plasma Product Information    Collection Time: 03/26/18  4:54 AM   Result Value Ref Range    Unit Type O Pos     Unit Number [id number]     Status Canceled     Component FROZEN PLASMA     PRODUCT CODE [id number]     Blood Type 5100     CODING SYSTEM [id number]    Protime-INR (if on Coumadin)    Collection Time: 03/26/18  5:44 AM   Result Value Ref Range    INR 1.46 (H) 0.90 - 1.10   APTT  (if on Coumadin)    Collection Time: 03/26/18  5:44 AM   Result Value Ref Range    PTT 55 (H) 24 - 37 seconds   Magnesium    Collection Time: 03/26/18  5:44 AM   Result Value Ref Range    Magnesium 2.0 1.8 - 2.6 mg/dL   Basic Metabolic Panel    Collection Time: 03/26/18  5:44 AM   Result Value Ref Range    Sodium 134 (L) 136 - 145 mmol/L     Potassium 4.2 3.5 - 5.0 mmol/L    Chloride 104 98 - 107 mmol/L    CO2 21 (L) 22 - 31 mmol/L    Anion Gap, Calculation 9 5 - 18 mmol/L    Glucose 168 (H) 70 - 125 mg/dL    Calcium 8.2 (L) 8.5 - 10.5 mg/dL    BUN 44 (H) 8 - 28 mg/dL    Creatinine 5.08 (H) 0.70 - 1.30 mg/dL    GFR MDRD Af Amer 13 (L) >60 mL/min/1.73m2    GFR MDRD Non Af Amer 11 (L) >60 mL/min/1.73m2   HM2(CBC W/O DIFF)    Collection Time: 03/26/18  5:44 AM   Result Value Ref Range    WBC 10.6 4.0 - 11.0 thou/uL    RBC 3.11 (L) 4.40 - 6.20 mill/uL    Hemoglobin 9.6 (L) 14.0 - 18.0 g/dL    Hematocrit 28.7 (L) 40.0 - 54.0 %    MCV 92 80 - 100 fL    MCH 30.9 27.0 - 34.0 pg    MCHC 33.4 32.0 - 36.0 g/dL    RDW 12.8 11.0 - 14.5 %    Platelets 148 140 - 440 thou/uL    MPV 8.4 (L) 8.5 - 12.5 fL   POCT Glucose    Collection Time: 03/26/18  6:54 AM   Result Value Ref Range    Glucose,  mg/dL   POCT Glucose    Collection Time: 03/26/18 11:08 AM   Result Value Ref Range    Glucose,  mg/dL   POCT Glucose    Collection Time: 03/26/18  5:17 PM   Result Value Ref Range    Glucose,  mg/dL   Basic Metabolic Panel    Collection Time: 03/26/18  8:29 PM   Result Value Ref Range    Sodium 136 136 - 145 mmol/L    Potassium 4.1 3.5 - 5.0 mmol/L    Chloride 103 98 - 107 mmol/L    CO2 20 (L) 22 - 31 mmol/L    Anion Gap, Calculation 13 5 - 18 mmol/L    Glucose 124 70 - 125 mg/dL    Calcium 8.7 8.5 - 10.5 mg/dL    BUN 53 (H) 8 - 28 mg/dL    Creatinine 5.21 (H) 0.70 - 1.30 mg/dL    GFR MDRD Af Amer 13 (L) >60 mL/min/1.73m2    GFR MDRD Non Af Amer 11 (L) >60 mL/min/1.73m2   POCT Glucose    Collection Time: 03/26/18  9:32 PM   Result Value Ref Range    Glucose,  mg/dL   POCT Glucose    Collection Time: 03/27/18  6:52 AM   Result Value Ref Range    Glucose,  mg/dL   Protime-INR (if on Coumadin)    Collection Time: 03/27/18  7:15 AM   Result Value Ref Range    INR 1.73 (H) 0.90 - 1.10   APTT  (if on Coumadin)    Collection Time: 03/27/18  7:15 AM    Result Value Ref Range    PTT 51 (H) 24 - 37 seconds   HGB    Collection Time: 03/27/18  7:15 AM   Result Value Ref Range    Hemoglobin 9.1 (L) 14.0 - 18.0 g/dL   Reticulocytes    Collection Time: 03/27/18  7:15 AM   Result Value Ref Range    Retic Absolute Count 0.044 0.010 - 0.110 mill/uL   POCT Glucose    Collection Time: 03/27/18 11:58 AM   Result Value Ref Range    Glucose,  mg/dL   POCT Glucose    Collection Time: 03/27/18  5:22 PM   Result Value Ref Range    Glucose,  mg/dL   POCT Glucose    Collection Time: 03/27/18  9:35 PM   Result Value Ref Range    Glucose,  mg/dL   POCT Glucose    Collection Time: 03/28/18  6:44 AM   Result Value Ref Range    Glucose,  mg/dL   Protime-INR (if on Coumadin)    Collection Time: 03/28/18  7:23 AM   Result Value Ref Range    INR 2.42 (H) 0.90 - 1.10   APTT  (if on Coumadin)    Collection Time: 03/28/18  7:23 AM   Result Value Ref Range    PTT 60 (H) 24 - 37 seconds   Basic Metabolic Panel    Collection Time: 03/28/18  7:23 AM   Result Value Ref Range    Sodium 134 (L) 136 - 145 mmol/L    Potassium 4.1 3.5 - 5.0 mmol/L    Chloride 103 98 - 107 mmol/L    CO2 20 (L) 22 - 31 mmol/L    Anion Gap, Calculation 11 5 - 18 mmol/L    Glucose 110 70 - 125 mg/dL    Calcium 8.2 (L) 8.5 - 10.5 mg/dL    BUN 52 (H) 8 - 28 mg/dL    Creatinine 4.81 (H) 0.70 - 1.30 mg/dL    GFR MDRD Af Amer 14 (L) >60 mL/min/1.73m2    GFR MDRD Non Af Amer 12 (L) >60 mL/min/1.73m2   Hemoglobin    Collection Time: 03/28/18  7:23 AM   Result Value Ref Range    Hemoglobin 9.2 (L) 14.0 - 18.0 g/dL   Iron and Transferrin Iron Binding Capacity    Collection Time: 03/28/18  7:27 AM   Result Value Ref Range    Iron 42 42 - 175 ug/dL    Transferrin 169 (L) 212 - 360 mg/dL    Transferrin Saturation, Calculated 20 20 - 50 %    Transferrin IBC, Calculated 212 (L) 313 - 563 ug/dL       Assessment/Plan:  1.  DM: refuses all insulin which will be d/c'd, monitor BS BID, 80-200s, previously d/c'd  oral.  2.  HTN: continue hydralazine, coreg and imdur, BP elevated. Monitor.  3.  CHF: recently lasix was d/c'd, weight stable at 175lb, monitor.  4.  Afib: INR 3.1, currently coumadin 2.25mg daily, recheck on 4/2. Continue amiodarone for rate control.  5.  ESRD: last Cr 4.81 with GFR 12, no HD  6.  Insomnia: refuses trazodone so will dc, sleeps 3-4hrs per night, continue melatonin 6mg, continue zypreza 2.5mg at HS, effective.  7.  Hypomagnesia: last 2.5, decrease mag oxide 400mg daily, monitor  8.  Hypokalemia: recently d/c'd lasix, recheck BMP on 4/2.  9.  Agitation: stable, none recorded, monitor  10: Elopement: in locked unit, attempted recently.  11. Dementia: Exelon patch, no change  12. Hypothyroidism: last TSH 3.37 on 11/17  13. ABLA: last Hgb 9.2, recheck CBC on 4/2. Adequate stores.  14. Pain management: dc ASA, start tramadol 50mg TID, monitor.    The care plan has been reviewed and all orders signed. Changes to care plan, if any, as noted. Otherwise, continue care plan of care.  The total time spent with this patient was greater than 40 minutes, with greater than 50% spent in counseling and coordination of care that included multiple issues per chart review from recent hospitalization.       Electronically signed by: Reji Dougherty NP

## 2021-06-17 NOTE — PROGRESS NOTES
[facility]      Facility:    [facility] [[id number]]  Code Status: DNR/DNI      Chief Complaint/Reason for Visit:  Chief Complaint   Patient presents with     Problem Visit       HPI:   Scout is a 77 y.o. male who is recent transfer from [facility].  He has a past medical history of congestive heart failure with systolic dysfunction, diabetes, chronic kidney disease stage IV now 5 and hypertension who lives in his own home who was discharged from this TCU in Nov 2017.  He was brought to the ER by police with severe confusion on 2/4/18 and was discharged on 2/7/18 to the TCU.  Per wife he has been noncompliant with medications and has several violent outbursts, though nothing physical that is known. Historically his has ESRD and has refused dialysis.  Per nephrology his baseline creatinine is 3-4, with a level on 4.22 on admission.  On exam today and previously he is cooperative, no adverse behaviors noted. . Per our previous discussion discontinued his insulin and attempt to maintain some control of BS, which have been mostly controlled, continue to monitor.  Further he does not want any blood draws so INRs will be maintained in house. He states sleeping well so will discontinue trazodone, monitored sleep, will continue melatonin and start olanzapine at HS.    Recent hospitalization:  Patient was sent to [facility] per finding him on the floor. Per imaging was found to have L hip fx, initial INR elevated, given FFP per surgery. Also Cr was 6+, stopped lasix and K supplementation. Was admitted on 3/24/18 and discharged on 3/28/18. He now has WBAT on L hip, though previously discontinued tramadol, now experiencing more pain and limiting therapy, will restart tramadol and monitor progress. DEXA scan pending in may.    Patient denies headache, chest pain, numbness or tingling, shortest of breath, eating or swallowing concerns, nausea or vomiting, diarrhea  or bowel abnormalities, or no new integumentary concerns today.       Past Medical History:  Past Medical History:   Diagnosis Date     Acute embolic stroke      Aortic stenosis 5/4/2015     Aortic stenosis      Benign Essential Hypertension     Created by Conversion      Chronic Kidney Disease, Stage 3     Created by Conversion      Dementia      Depression      Diabetes mellitus, type II      Diabetes With Renal Manifestations     Created by Conversion      Essential Hypercholesterolemia     Created by Conversion      Type 2 Diabetes Mellitus     Created by Conversion      Vertigo     Created by Conversion            Surgical History:  Past Surgical History:   Procedure Laterality Date     CATARACT EXTRACTION       HIP PINNING Left 3/25/2018    Procedure: LEFT HIP OPEN REDUCTION INTERNAL FIXATION;  Surgeon: Gamaliel Reeder MD;  Location: [facility];  Service:        Family History:   Family History   Problem Relation Age of Onset     Heart disease Mother      Mental illness Father        Social History:    Social History     Social History     Marital status:      Spouse name: N/A     Number of children: N/A     Years of education: N/A     Social History Main Topics     Smoking status: Former Smoker     Quit date: 5/26/1977     Smokeless tobacco: Never Used     Alcohol use No     Drug use: No     Sexual activity: Not on file     Other Topics Concern     Not on file     Social History Narrative          Review of Systems   Constitutional: Negative for activity change, appetite change, diaphoresis, fatigue and fever.        New L hip fx - ongoing therapy   HENT: Negative for congestion, facial swelling and hearing loss.    Eyes: Negative for photophobia, pain and visual disturbance.   Respiratory: Negative for apnea, shortness of breath and wheezing.    Cardiovascular: Negative for chest pain.   Gastrointestinal: Negative for abdominal distention, abdominal pain, blood in stool, constipation and  diarrhea.   Endocrine: Positive for polyuria.   Genitourinary: Positive for frequency. Negative for dysuria.        Retention   Musculoskeletal:        Pain with movement in L leg - improved bed mobility/therapy   Skin:        L hip has drsg   Allergic/Immunologic: Negative.    Neurological: Negative for dizziness, speech difficulty, weakness and light-headedness.   Hematological: Negative.    Psychiatric/Behavioral: Positive for confusion. Negative for agitation, hallucinations and sleep disturbance. The patient is not nervous/anxious.        Vitals:    04/28/18 1316   BP: 157/78   Pulse: 73   Resp: 18   Temp: 98  F (36.7  C)   SpO2: 96%   Weight: 172 lb (78 kg)       Physical Exam   Constitutional: He appears well-developed. No distress.   Pain control per recent L hip fx - improved   HENT:   Head: Normocephalic and atraumatic.   Mouth/Throat: Oropharynx is clear and moist. No oropharyngeal exudate.   Eyes: Pupils are equal, round, and reactive to light. Right eye exhibits no discharge. Left eye exhibits no discharge. No scleral icterus.   Neck: Normal range of motion. Neck supple. No JVD present.   Cardiovascular: Exam reveals no gallop and no friction rub.    Murmur heard.  Reg, 2/6 CARMEN LSB   Pulmonary/Chest: Effort normal and breath sounds normal. No stridor. He has no wheezes. He has no rales.   Dim, RA   Abdominal: Soft. Bowel sounds are normal. He exhibits no distension. There is no tenderness.   No constipation or diarrhea   Genitourinary:   Genitourinary Comments: Deferred   Musculoskeletal: He exhibits no edema, tenderness or deformity.   Pain with movement, receiving diatherapy, decrease schedule tramadol x1 wk and decrease tylenol 1000mg to TID   Neurological: He is alert. No cranial nerve deficit.   Alert and oriented ×1, no focal deficits   Skin: Skin is warm and dry. He is not diaphoretic.   L hip, drsg C/D/I   Psychiatric: He has a normal mood and affect.   No anxiety or depression, no behavioral  issues, [facility]   Vitals reviewed.      Medication List:  Current Outpatient Prescriptions   Medication Sig     traMADol (ULTRAM) 50 mg tablet Take 25 mg by mouth 3 (three) times a day.     acetaminophen (TYLENOL) 500 MG tablet Take 1,000 mg by mouth 3 (three) times a day.     amiodarone (PACERONE) 200 MG tablet Take 1 tablet (200 mg total) by mouth every other day.     carvedilol (COREG) 25 MG tablet Take 25 mg by mouth 2 (two) times a day with meals. Hold if SBP <120 or pulse <60     docusate sodium (COLACE) 100 MG capsule Take 1 capsule (100 mg total) by mouth 2 (two) times a day.     ferrous sulfate 325 (65 FE) MG tablet Take 1 tablet by mouth daily with breakfast.     hydrALAZINE (APRESOLINE) 10 MG tablet Take 1 tablet (10 mg total) by mouth 4 (four) times a day.     isosorbide mononitrate (IMDUR) 30 MG 24 hr tablet Take 30 mg by mouth daily.     levothyroxine (SYNTHROID, LEVOTHROID) 100 MCG tablet Take 100 mcg by mouth daily.     magnesium oxide (MAG-OX) 400 mg tablet Take 400 mg by mouth daily.     melatonin 3 mg Tab tablet Take 6 mg by mouth at bedtime.     OLANZapine (ZYPREXA) 2.5 MG tablet Take 2.5 mg by mouth at bedtime.     polyethylene glycol (MIRALAX) 17 gram packet Take 1 packet (17 g total) by mouth 2 (two) times a day.     rivastigmine (EXELON) 4.6 mg/24 hr Place 1 patch on the skin daily.     senna-docusate (PERICOLACE) 8.6-50 mg tablet Take 1 tablet by mouth 2 (two) times a day.     tamsulosin (FLOMAX) 0.4 mg Cp24 Take 1 capsule (0.4 mg total) by mouth 2 (two) times a day.     triamcinolone (KENALOG) 0.5 % ointment Apply 1 application topically 2 (two) times a day as needed.      WARFARIN SODIUM (WARFARIN DAILY DOSE) Take by mouth daily with supper. 4/2/18 INR 5.0 Hold 2 & 3, next INR 4/4.  3/31/18 3.37 2.5mg qd  3/28/17 2.42 2.5mg qd.  3/27/18 INR 1.73 had 3.5mg  3/26/18 had 3.5mg  3/25/18 had 5mg       Labs:  Recent Results (from the past 168 hour(s))   Basic Metabolic Panel    Collection Time:  04/23/18  9:24 AM   Result Value Ref Range    Sodium 138 136 - 145 mmol/L    Potassium 4.6 3.5 - 5.0 mmol/L    Chloride 107 98 - 107 mmol/L    CO2 19 (L) 22 - 31 mmol/L    Anion Gap, Calculation 12 5 - 18 mmol/L    Glucose 140 (H) 70 - 125 mg/dL    Calcium 8.8 8.5 - 10.5 mg/dL    BUN 45 (H) 8 - 28 mg/dL    Creatinine 4.86 (H) 0.70 - 1.30 mg/dL    GFR MDRD Af Amer 14 (L) >60 mL/min/1.73m2    GFR MDRD Non Af Amer 12 (L) >60 mL/min/1.73m2   Glycosylated Hemoglobin A1C    Collection Time: 04/23/18  9:24 AM   Result Value Ref Range    Hemoglobin A1c 5.8 4.2 - 6.1 %   Thyroid Stimulating Hormone (TSH)    Collection Time: 04/23/18  9:24 AM   Result Value Ref Range    TSH 0.87 0.30 - 5.00 uIU/mL   Vitamin B12    Collection Time: 04/23/18  9:24 AM   Result Value Ref Range    Vitamin B-12 556 213 - 816 pg/mL   Vitamin D, Total (25-Hydroxy)    Collection Time: 04/23/18  9:24 AM   Result Value Ref Range    Vitamin D, Total (25-Hydroxy) 28.6 (L) 30.0 - 80.0 ng/mL       Assessment/Plan:  1.  DM: refuses all insulin which will be d/c'd, monitor BS BID, 80-200s, previously d/c'd oral per renal fx.  2.  HTN: continue hydralazine, coreg and imdur, BP elevated. Monitor.  3.  CHF: recently lasix was d/c'd, weight stable at 172lb.  4.  Afib: INR therapeutic at 2.1 on coumadin 1.5mg daily, recheck on 5/1. Continue amiodarone for rate control.  5.  ESRD: last Cr 4.81 with GFR 12, no HD.  6.  Insomnia: refuses trazodone so will dc, sleeps 3-4hrs per night, continue melatonin 6mg, continue zypreza 2.5mg at HS, effective.  7.  Hypomagnesia: last 2.5, decrease mag oxide 400mg daily, monitor.  8.  Hypokalemia: recently d/c'd lasix, recheck K 4.6 on 4/23/18.  9.  Agitation: stable, none recorded, monitor.  10: Elopement: in locked unit, attempted previously though has decreased per hip fx.  11. Dementia: Exelon patch, no change.  12. Hypothyroidism: last TSH 3.37 on 11/17.  13. ABLA: last Hgb 9.2, recheck CBC on 4/2. Adequate stores.  14. Pain  management: dc ASA, decrease tramadol, was stopped though not having more pain, tramadol 25mg TID restarted, also maintianed tylenol to 1000mg TID, monitor response.  15. L hip fx: f/u with ortho as directed, pending DEXA scan in may  16. Vit D def: start D3 2000U daily, recheck in 6 wks.     The care plan has been reviewed and all orders signed. Changes to care plan, if any, as noted. Otherwise, continue care plan of care.       Electronically signed by: Reji Dougherty NP

## 2021-06-17 NOTE — PROGRESS NOTES
[facility]      Code Status:  DNR  Visit Type: H & P     Facility:  [facility] [[id number]]           History of Present Illness: Scout Batista is a 77 y.o. male who is currently a resident of long-term care and has moved from TCU to long-term care  This is an elderly gentleman with advanced renal failure along with dementia as well as diabetes and congestive heart failure  He was in the TCU recently after he was admitted from the hospital with acute encephalopathy.  He remains in elopement risk and had made several attempts to leave the [facility] transitional care unit where he was at  He was sent to the emergency room over the weekend after a fall with left hip pain and ecchymoses.  Imaging was negative for any fracture.  He is on tramadol and currently denying any pain or discomfort.  His mood and behaviors have been stable however in the evening he does tend to get more agitated and starts pacing and makes multiple elopement events  He is also diabetic and will frequently refuses medications.  Blood sugars however been stable.  INR supratherapeutic at 5 today.    Past Medical History:   Diagnosis Date     Acute embolic stroke      Aortic stenosis 5/4/2015     Aortic stenosis      Benign Essential Hypertension     Created by Conversion      Chronic Kidney Disease, Stage 3     Created by Conversion      Dementia      Depression      Diabetes mellitus, type II      Diabetes With Renal Manifestations     Created by Conversion      Essential Hypercholesterolemia     Created by Conversion      Type 2 Diabetes Mellitus     Created by Conversion      Vertigo     Created by Conversion      Past Surgical History:   Procedure Laterality Date     CATARACT EXTRACTION       HIP PINNING Left 3/25/2018    Procedure: LEFT HIP OPEN REDUCTION INTERNAL FIXATION;  Surgeon: Gamaliel Reeder MD;  Location: [facility];  Service:      Family History   Problem Relation Age of Onset      Heart disease Mother      Mental illness Father      Social History     Social History     Marital status:      Spouse name: N/A     Number of children: N/A     Years of education: N/A     Occupational History     Not on file.     Social History Main Topics     Smoking status: Former Smoker     Quit date: 5/26/1977     Smokeless tobacco: Never Used     Alcohol use No     Drug use: No     Sexual activity: Not on file     Other Topics Concern     Not on file     Social History Narrative     Current Outpatient Prescriptions   Medication Sig Dispense Refill     acetaminophen (TYLENOL) 325 MG tablet Take 2 tablets (650 mg total) by mouth 4 (four) times a day.  0     acetaminophen (TYLENOL) 325 MG tablet Take 1 tablet (325 mg total) by mouth every 4 (four) hours as needed.  0     amiodarone (PACERONE) 200 MG tablet Take 1 tablet (200 mg total) by mouth every other day. 90 tablet 2     carvedilol (COREG) 25 MG tablet Take 25 mg by mouth 2 (two) times a day with meals. Hold if SBP <120 or pulse <60       docusate sodium (COLACE) 100 MG capsule Take 1 capsule (100 mg total) by mouth 2 (two) times a day.  0     hydrALAZINE (APRESOLINE) 10 MG tablet Take 1 tablet (10 mg total) by mouth 4 (four) times a day.  0     isosorbide mononitrate (IMDUR) 30 MG 24 hr tablet Take 30 mg by mouth daily.       levothyroxine (SYNTHROID, LEVOTHROID) 100 MCG tablet Take 100 mcg by mouth daily.       magnesium oxide (MAG-OX) 400 mg tablet Take 400 mg by mouth daily.       melatonin 3 mg Tab tablet Take 6 mg by mouth at bedtime.       OLANZapine (ZYPREXA) 2.5 MG tablet Take 2.5 mg by mouth at bedtime.       polyethylene glycol (MIRALAX) 17 gram packet Take 1 packet (17 g total) by mouth 2 (two) times a day.  0     rivastigmine (EXELON) 4.6 mg/24 hr Place 1 patch on the skin daily. 30 patch 0     senna-docusate (PERICOLACE) 8.6-50 mg tablet Take 1 tablet by mouth 2 (two) times a day.  0     tamsulosin (FLOMAX) 0.4 mg Cp24 Take 1 capsule  (0.4 mg total) by mouth 2 (two) times a day.  0     traMADol (ULTRAM) 50 mg tablet Take 50 mg by mouth 3 (three) times a day. Ok for 1 additional tab daily       triamcinolone (KENALOG) 0.5 % ointment Apply 1 application topically 2 (two) times a day as needed.        warfarin (COUMADIN) 2.5 MG tablet 2.5 mg q day and adjust per INR (Patient taking differently: 2.25 mg. 2.5 mg q day and adjust per INR)  0     No current facility-administered medications for this visit.      No Known Allergies      Review of Systems:    Constitutional: Negative.  Negative for fever, chills,HAS  activity change, appetite change and fatigue.   HENT: Negative for congestion and facial swelling.    Eyes: Negative for photophobia, redness and visual disturbance.   Respiratory: Negative for cough and chest tightness.    Cardiovascular: Negative for chest pain, palpitations and leg swelling.   Gastrointestinal: Negative for nausea, diarrhea, constipation, blood in stool and abdominal distention.   Genitourinary: Negative.    Musculoskeletal: Negative.    He fell and was complaining of hip pain  Skin: Negative.    Neurological: Negative for dizziness, tremors, syncope, weakness, light-headedness and headaches.   Hematological: Does not bruise/bleed easily.   Psychiatric/Behavioral: Negative.    Confused and alert and oriented to self only    Vitals:    04/02/18 1550   BP: 152/82   Pulse: 79   Resp: 17   Temp: 98  F (36.7  C)       Physical Exam:    GENERAL: no acute distress. Cooperative in conversation.   HEENT: pupils are equal, round and reactive. Oral mucosa is moist and intact.  RESP:Chest symmetric. Regular respiratory rate. No stridor.  CVS: S1S2  ABD: Nondistended, soft.  EXTREMITIES: No lower extremity edema.   NEURO: non focal. Alert and oriented xSELF.   PSYCH: within normal limits. No depression or anxiety.  SKIN: warm dry intact       Labs:  inr5  Recent Results (from the past 240 hour(s))   ECG 12 lead nursing unit performed    Result Value Ref Range    SYSTOLIC BLOOD PRESSURE 144 mmHg    DIASTOLIC BLOOD PRESSURE 71 mmHg    VENTRICULAR RATE 59 BPM    ATRIAL RATE 59 BPM    P-R INTERVAL  ms    QRS DURATION 132 ms    Q-T INTERVAL 482 ms    QTC CALCULATION (BEZET) 477 ms    P Axis  degrees    R AXIS -61 degrees    T AXIS -15 degrees    MUSE DIAGNOSIS       Sinus bradycardia  Left bundle branch block  Abnormal ECG  When compared with ECG of 04-FEB-2018 03:38,    Vent. rate has decreased BY  30 BPM  Confirmed by LOU CERVANTES MD LOC: ([id number]) on 3/25/2018 3:31:56 PM     Troponin I   Result Value Ref Range    Troponin I 0.04 0.00 - 0.29 ng/mL   INR   Result Value Ref Range    INR 6.49 (HH) 0.90 - 1.10   APTT   Result Value Ref Range    PTT 66 (H) 24 - 37 seconds   Comprehensive Metabolic Panel   Result Value Ref Range    Sodium 138 136 - 145 mmol/L    Potassium 4.1 3.5 - 5.0 mmol/L    Chloride 107 98 - 107 mmol/L    CO2 21 (L) 22 - 31 mmol/L    Anion Gap, Calculation 10 5 - 18 mmol/L    Glucose 93 70 - 125 mg/dL    BUN 48 (H) 8 - 28 mg/dL    Creatinine 6.00 (H) 0.70 - 1.30 mg/dL    GFR MDRD Af Amer 11 (L) >60 mL/min/1.73m2    GFR MDRD Non Af Amer 9 (L) >60 mL/min/1.73m2    Bilirubin, Total 0.5 0.0 - 1.0 mg/dL    Calcium 8.7 8.5 - 10.5 mg/dL    Protein, Total 7.5 6.0 - 8.0 g/dL    Albumin 3.1 (L) 3.5 - 5.0 g/dL    Alkaline Phosphatase 84 45 - 120 U/L    AST 21 0 - 40 U/L    ALT 17 0 - 45 U/L   Magnesium   Result Value Ref Range    Magnesium 2.1 1.8 - 2.6 mg/dL   HM1 (CBC with Diff)   Result Value Ref Range    WBC 8.8 4.0 - 11.0 thou/uL    RBC 3.53 (L) 4.40 - 6.20 mill/uL    Hemoglobin 11.1 (L) 14.0 - 18.0 g/dL    Hematocrit 33.2 (L) 40.0 - 54.0 %    MCV 94 80 - 100 fL    MCH 31.4 27.0 - 34.0 pg    MCHC 33.4 32.0 - 36.0 g/dL    RDW 13.3 11.0 - 14.5 %    Platelets 214 140 - 440 thou/uL    MPV 8.4 (L) 8.5 - 12.5 fL    Neutrophils % 70 50 - 70 %    Lymphocytes % 17 (L) 20 - 40 %    Monocytes % 9 2 - 10 %    Eosinophils % 3 0 - 6 %    Basophils %  1 0 - 2 %    Neutrophils Absolute 6.1 2.0 - 7.7 thou/uL    Lymphocytes Absolute 1.5 0.8 - 4.4 thou/uL    Monocytes Absolute 0.8 0.0 - 0.9 thou/uL    Eosinophils Absolute 0.3 0.0 - 0.4 thou/uL    Basophils Absolute 0.0 0.0 - 0.2 thou/uL   Urinalysis-UC if Indicated   Result Value Ref Range    Color, UA Yellow Colorless, Yellow, Straw, Light Yellow    Clarity, UA Clear Clear    Glucose, UA Negative Negative    Bilirubin, UA Negative Negative    Ketones, UA Negative Negative    Specific Gravity, UA 1.005 1.001 - 1.030    Blood, UA Moderate (!) Negative    pH, UA 5.0 4.5 - 8.0    Protein, UA 30 mg/dL (!) Negative mg/dL    Urobilinogen, UA <2.0 E.U./dL <2.0 E.U./dL, 2.0 E.U./dL    Nitrite, UA Negative Negative    Leukocytes, UA Small (!) Negative    Bacteria, UA Few (!) None Seen hpf    RBC, UA 5-10 (!) None Seen, 0-2 hpf    WBC, UA 5-10 (!) None Seen, 0-5 hpf    Squam Epithel, UA 0-5 None Seen, 0-5 lpf    Mucus, UA Few (!) None Seen lpf   Culture, Urine   Result Value Ref Range    Culture >100,000 col/ml Coagulase negative Staphylococcus (!)        Susceptibility    Coagulase negative Staphylococcus - JA     Cefazolin <=2 Sensitive      Doxycycline <=0.5 Sensitive      Nitrofurantoin <=16 Sensitive      Levofloxacin <=0.5 Sensitive      Oxacillin <=0.25 Sensitive      Vancomycin 2 Sensitive    Type and Screen   Result Value Ref Range    ABORh O NEG     Antibody Screen Negative Negative   Protime-INR (if on Coumadin)   Result Value Ref Range    INR 4.84 (H) 0.90 - 1.10   APTT  (if on Coumadin)   Result Value Ref Range    PTT 60 (H) 24 - 37 seconds   POCT Glucose   Result Value Ref Range    Glucose,  mg/dL   Protime-INR   Result Value Ref Range    INR 1.74 (H) 0.90 - 1.10   Basic metabolic panel (If not done in ED)   Result Value Ref Range    Sodium 137 136 - 145 mmol/L    Potassium 3.8 3.5 - 5.0 mmol/L    Chloride 107 98 - 107 mmol/L    CO2 22 22 - 31 mmol/L    Anion Gap, Calculation 8 5 - 18 mmol/L    Glucose 97  70 - 125 mg/dL    Calcium 8.1 (L) 8.5 - 10.5 mg/dL    BUN 44 (H) 8 - 28 mg/dL    Creatinine 5.35 (H) 0.70 - 1.30 mg/dL    GFR MDRD Af Amer 13 (L) >60 mL/min/1.73m2    GFR MDRD Non Af Amer 10 (L) >60 mL/min/1.73m2   Protime-INR (if on Coumadin)   Result Value Ref Range    INR 1.48 (H) 0.90 - 1.10   APTT  (if on Coumadin)   Result Value Ref Range    PTT 48 (H) 24 - 37 seconds   Magnesium   Result Value Ref Range    Magnesium 1.7 (L) 1.8 - 2.6 mg/dL   HM1 (CBC with Diff)   Result Value Ref Range    WBC 11.8 (H) 4.0 - 11.0 thou/uL    RBC 3.03 (L) 4.40 - 6.20 mill/uL    Hemoglobin 9.4 (L) 14.0 - 18.0 g/dL    Hematocrit 28.3 (L) 40.0 - 54.0 %    MCV 93 80 - 100 fL    MCH 31.0 27.0 - 34.0 pg    MCHC 33.2 32.0 - 36.0 g/dL    RDW 13.0 11.0 - 14.5 %    Platelets 157 140 - 440 thou/uL    MPV 8.3 (L) 8.5 - 12.5 fL    Neutrophils % 76 (H) 50 - 70 %    Lymphocytes % 11 (L) 20 - 40 %    Monocytes % 10 2 - 10 %    Eosinophils % 3 0 - 6 %    Basophils % 0 0 - 2 %    Neutrophils Absolute 9.0 (H) 2.0 - 7.7 thou/uL    Lymphocytes Absolute 1.3 0.8 - 4.4 thou/uL    Monocytes Absolute 1.2 (H) 0.0 - 0.9 thou/uL    Eosinophils Absolute 0.3 0.0 - 0.4 thou/uL    Basophils Absolute 0.0 0.0 - 0.2 thou/uL   Protime-INR   Result Value Ref Range    INR 1.48 (H) 0.90 - 1.10   Protime-INR   Result Value Ref Range    INR 1.46 (H) 0.90 - 1.10   POCT Glucose   Result Value Ref Range    Glucose,  mg/dL   Potassium   Result Value Ref Range    Potassium 4.6 3.5 - 5.0 mmol/L   Plasma Product Information   Result Value Ref Range    Unit Type O Pos     Blood Expiration Date 20180325151500     Unit Number [id number]     Status Transfused     Component FROZEN PLASMA     PRODUCT CODE [id number]     Issue Date and Time [id number]     Blood Type 5100     CODING SYSTEM [id number]    Plasma Product Information   Result Value Ref Range    Unit Type O Pos     Blood Expiration Date 64390214528519     Unit Number [id number]     Status Transfused      Component FROZEN PLASMA     PRODUCT CODE [id number]     Issue Date and Time 26144273936410     Blood Type 5100     CODING SYSTEM [id number]    Plasma Product Information   Result Value Ref Range    Unit Type O Pos     Unit Number [id number]     Status Canceled     Component FROZEN PLASMA     PRODUCT CODE [id number]     Blood Type 5100     CODING SYSTEM [id number]    Protime-INR (if on Coumadin)   Result Value Ref Range    INR 1.46 (H) 0.90 - 1.10   APTT  (if on Coumadin)   Result Value Ref Range    PTT 55 (H) 24 - 37 seconds   Magnesium   Result Value Ref Range    Magnesium 2.0 1.8 - 2.6 mg/dL   Basic Metabolic Panel   Result Value Ref Range    Sodium 134 (L) 136 - 145 mmol/L    Potassium 4.2 3.5 - 5.0 mmol/L    Chloride 104 98 - 107 mmol/L    CO2 21 (L) 22 - 31 mmol/L    Anion Gap, Calculation 9 5 - 18 mmol/L    Glucose 168 (H) 70 - 125 mg/dL    Calcium 8.2 (L) 8.5 - 10.5 mg/dL    BUN 44 (H) 8 - 28 mg/dL    Creatinine 5.08 (H) 0.70 - 1.30 mg/dL    GFR MDRD Af Amer 13 (L) >60 mL/min/1.73m2    GFR MDRD Non Af Amer 11 (L) >60 mL/min/1.73m2   HM2(CBC W/O DIFF)   Result Value Ref Range    WBC 10.6 4.0 - 11.0 thou/uL    RBC 3.11 (L) 4.40 - 6.20 mill/uL    Hemoglobin 9.6 (L) 14.0 - 18.0 g/dL    Hematocrit 28.7 (L) 40.0 - 54.0 %    MCV 92 80 - 100 fL    MCH 30.9 27.0 - 34.0 pg    MCHC 33.4 32.0 - 36.0 g/dL    RDW 12.8 11.0 - 14.5 %    Platelets 148 140 - 440 thou/uL    MPV 8.4 (L) 8.5 - 12.5 fL   POCT Glucose   Result Value Ref Range    Glucose,  mg/dL   POCT Glucose   Result Value Ref Range    Glucose,  mg/dL   POCT Glucose   Result Value Ref Range    Glucose,  mg/dL   Basic Metabolic Panel   Result Value Ref Range    Sodium 136 136 - 145 mmol/L    Potassium 4.1 3.5 - 5.0 mmol/L    Chloride 103 98 - 107 mmol/L    CO2 20 (L) 22 - 31 mmol/L    Anion Gap, Calculation 13 5 - 18 mmol/L    Glucose 124 70 - 125 mg/dL    Calcium 8.7 8.5 - 10.5 mg/dL    BUN 53 (H) 8 - 28 mg/dL    Creatinine 5.21 (H) 0.70 - 1.30 mg/dL     GFR MDRD Af Amer 13 (L) >60 mL/min/1.73m2    GFR MDRD Non Af Amer 11 (L) >60 mL/min/1.73m2   POCT Glucose   Result Value Ref Range    Glucose,  mg/dL   POCT Glucose   Result Value Ref Range    Glucose,  mg/dL   Protime-INR (if on Coumadin)   Result Value Ref Range    INR 1.73 (H) 0.90 - 1.10   APTT  (if on Coumadin)   Result Value Ref Range    PTT 51 (H) 24 - 37 seconds   HGB   Result Value Ref Range    Hemoglobin 9.1 (L) 14.0 - 18.0 g/dL   Reticulocytes   Result Value Ref Range    Retic Absolute Count 0.044 0.010 - 0.110 mill/uL   POCT Glucose   Result Value Ref Range    Glucose,  mg/dL   POCT Glucose   Result Value Ref Range    Glucose,  mg/dL   POCT Glucose   Result Value Ref Range    Glucose,  mg/dL   POCT Glucose   Result Value Ref Range    Glucose,  mg/dL   Protime-INR (if on Coumadin)   Result Value Ref Range    INR 2.42 (H) 0.90 - 1.10   APTT  (if on Coumadin)   Result Value Ref Range    PTT 60 (H) 24 - 37 seconds   Basic Metabolic Panel   Result Value Ref Range    Sodium 134 (L) 136 - 145 mmol/L    Potassium 4.1 3.5 - 5.0 mmol/L    Chloride 103 98 - 107 mmol/L    CO2 20 (L) 22 - 31 mmol/L    Anion Gap, Calculation 11 5 - 18 mmol/L    Glucose 110 70 - 125 mg/dL    Calcium 8.2 (L) 8.5 - 10.5 mg/dL    BUN 52 (H) 8 - 28 mg/dL    Creatinine 4.81 (H) 0.70 - 1.30 mg/dL    GFR MDRD Af Amer 14 (L) >60 mL/min/1.73m2    GFR MDRD Non Af Amer 12 (L) >60 mL/min/1.73m2   Hemoglobin   Result Value Ref Range    Hemoglobin 9.2 (L) 14.0 - 18.0 g/dL   Iron and Transferrin Iron Binding Capacity   Result Value Ref Range    Iron 42 42 - 175 ug/dL    Transferrin 169 (L) 212 - 360 mg/dL    Transferrin Saturation, Calculated 20 20 - 50 %    Transferrin IBC, Calculated 212 (L) 313 - 563 ug/dL   Basic Metabolic Panel   Result Value Ref Range    Sodium 139 136 - 145 mmol/L    Potassium 4.2 3.5 - 5.0 mmol/L    Chloride 107 98 - 107 mmol/L    CO2 20 (L) 22 - 31 mmol/L    Anion Gap, Calculation  12 5 - 18 mmol/L    Glucose 105 70 - 125 mg/dL    Calcium 8.7 8.5 - 10.5 mg/dL    BUN 50 (H) 8 - 28 mg/dL    Creatinine 4.38 (H) 0.70 - 1.30 mg/dL    GFR MDRD Af Amer 16 (L) >60 mL/min/1.73m2    GFR MDRD Non Af Amer 13 (L) >60 mL/min/1.73m2   INR   Result Value Ref Range    INR 3.37 (H) 0.90 - 1.10   HM1 (CBC with Diff)   Result Value Ref Range    WBC 14.0 (H) 4.0 - 11.0 thou/uL    RBC 3.14 (L) 4.40 - 6.20 mill/uL    Hemoglobin 9.7 (L) 14.0 - 18.0 g/dL    Hematocrit 29.9 (L) 40.0 - 54.0 %    MCV 95 80 - 100 fL    MCH 30.9 27.0 - 34.0 pg    MCHC 32.4 32.0 - 36.0 g/dL    RDW 13.8 11.0 - 14.5 %    Platelets 190 140 - 440 thou/uL    MPV 8.2 (L) 8.5 - 12.5 fL    Neutrophils % 75 (H) 50 - 70 %    Lymphocytes % 11 (L) 20 - 40 %    Monocytes % 9 2 - 10 %    Eosinophils % 4 0 - 6 %    Basophils % 0 0 - 2 %    Neutrophils Absolute 10.4 (H) 2.0 - 7.7 thou/uL    Lymphocytes Absolute 1.5 0.8 - 4.4 thou/uL    Monocytes Absolute 1.3 (H) 0.0 - 0.9 thou/uL    Eosinophils Absolute 0.6 (H) 0.0 - 0.4 thou/uL    Basophils Absolute 0.0 0.0 - 0.2 thou/uL   Basic Metabolic Panel   Result Value Ref Range    Sodium 136 136 - 145 mmol/L    Potassium 4.5 3.5 - 5.0 mmol/L    Chloride 104 98 - 107 mmol/L    CO2 21 (L) 22 - 31 mmol/L    Anion Gap, Calculation 11 5 - 18 mmol/L    Glucose 135 (H) 70 - 125 mg/dL    Calcium 8.7 8.5 - 10.5 mg/dL    BUN 49 (H) 8 - 28 mg/dL    Creatinine 4.59 (H) 0.70 - 1.30 mg/dL    GFR MDRD Af Amer 15 (L) >60 mL/min/1.73m2    GFR MDRD Non Af Amer 13 (L) >60 mL/min/1.73m2   HM2(CBC w/o Differential)   Result Value Ref Range    WBC 13.3 (H) 4.0 - 11.0 thou/uL    RBC 2.90 (L) 4.40 - 6.20 mill/uL    Hemoglobin 9.0 (L) 14.0 - 18.0 g/dL    Hematocrit 28.7 (L) 40.0 - 54.0 %    MCV 99 80 - 100 fL    MCH 31.0 27.0 - 34.0 pg    MCHC 31.4 (L) 32.0 - 36.0 g/dL    RDW 13.9 11.0 - 14.5 %    Platelets 205 140 - 440 thou/uL    MPV 8.5 8.5 - 12.5 fL     Xr Hip Left 2 Or More Vws    Result Date: 4/1/2018  XR HIP LEFT 2 OR MORE VWS  3/31/2018 11:58 PM INDICATION: LEFT HIP PAIN. FALL, RECENT HIP REPLACEMENT 3/25 COMPARISON: 03/24/2018 FINDINGS: Patient is post-ORIF left hip fracture with excellent alignment. No complication evident. No superimposed fracture identified. Remainder stable.     Ct Head Without Contrast    Result Date: 3/31/2018  [facility] CT HEAD WO CONTRAST 3/31/2018 11:43 PM INDICATION: Headache, post trauma fall, on coumadin TECHNIQUE: Without IV contrast. Dose reduction techniques were used. CONTRAST: None COMPARISON:  03/24/2018 FINDINGS: No hemorrhage, mass, or mass effect. Moderate atrophy. Moderate chronic small vessel ischemia. Normal orbits. Clear paranasal sinuses and mastoid air cells.     CONCLUSION: No definite acute intracranial process.     Ct Head Without Contrast    Result Date: 3/24/2018  [facility] CT HEAD WO CONTRAST 3/24/2018 12:22 PM INDICATION: Head injury mild or moderate acute, no neurological deficit head inury TECHNIQUE: Without IV contrast. Dose reduction techniques were used. CONTRAST: None. COMPARISON:  CT head 02/04/2018. FINDINGS: No intracranial hemorrhage, extraaxial collection, mass effect or CT evidence of acute infarct.  Stable generalized cerebral and cerebellar atrophy and chronic small vessel ischemic changes in the cerebral white matter from recent head CT. The ventricles and sulci are normal for age. Osseous structures are intact. The visualized orbits, paranasal sinuses and mastoid air cells are free of significant disease.     CONCLUSION: 1.  No evidence for acute intracranial hemorrhage, mass, hydrocephalus or acute infarct. Overall no significant change from head CT 02/04/2018. 2.  Stable generalized cerebral and cerebellar atrophy and chronic small vessel ischemic changes in the cerebral white matter. 3.  No acute calvarial fracture identified.     Xr C Arm Less Than One Hour    Result Date: 3/25/2018  Please see Operative or Procedure Note for details on this exam or  Radiology Report for body part of interest (if applicable).     Xr Chest 1 View    Result Date: 3/24/2018  XR CHEST 1 VIEW 3/24/2018 12:17 PM INDICATION: weakness COMPARISON: 02/04/2018 radiograph FINDINGS: No pleural fluid or pneumothorax. No consolidative airspace opacities. A few small calcified granulomas are suspected in the right midlung. Normal size heart.    Xr Pelvis W 2 Vw Hip Left    Result Date: 3/24/2018  XR PELVIS W 2 VW HIP LEFT 3/24/2018 12:27 PM INDICATION: Fall. Left hip pain. COMPARISON: None. FINDINGS: There is an intertrochanteric fracture involving the left femur with minimal displacement. No dislocation. Mild degenerative changes are present. NOTE: ABNORMAL REPORT THE DICTATION ABOVE DESCRIBES AN ABNORMALITY FOR WHICH FOLLOW-UP IS NEEDED.      Assessment/Plan:    Dementia with a slums of 9/30 in the TCU  Patient remains in memory care unit with several elopement events    ABhaskar back continues on his anticoagulation monitor INRs  Currently rate controlled on Coreg along with amiodarone.  Hold Coumadin due to supratherapeutic INR    chronic kidney disease stage IV.   His last creatinine was 4.22 in the past he has refused hemodialysis.  Creatinine was close to baseline at 4.3 in the ER recently.     Congestive heart failure chronic with systolic dysfunction appear to be euvolemic  He is on Lasix at a lower dose of 40 daily     Hypertension on multiple medications including Coreg, hydralazine     Diabetes type 2   .  He is also on a low-dose of glipizide.  Due to his refusal his blood sugar checks have been cut down to twice daily and he has been taken off insulin .  continue to monitor blood sugars.  Recheck hemoglobin A1c to assess control in a few weeks.  His blood sugars adequately controlled and if continue to be on the low side due to his risk of developing hypoglycemia will discontinue glipizide.     Hyperlipidemia he is on simvastatin     Hypothyroidism on replacement Synthroid     BPH given  Flomax     Depression     Anemia of chronic kidney disease      Nonischemic cardiomyopathy with an ejection fraction of 40% on Coreg     Mild aortic stenoses  Debilitation he is a modified independence walking about 200 feet.  Balance score Ricketts is 30/56 indicating a medium fall risk.  Recent history of fall with hip pain he was sent to the emergency room.  Imaging was negative for any broken bones and he has been discharged back to the TCU is on tramadol and denying any pain    Total time spent was 45 minutes, more than half of it was in face-to-face counseling regarding disease state, treatment, side effects, documentation, review of clinical data and coordination of care    Electronically signed by: CARA Carey  This progress note was completed using Dragon software and there may be grammatical errors.

## 2021-06-18 NOTE — PROGRESS NOTES
[facility]      Facility:    [facility] [[id number]]  Code Status: DNR/DNI      Chief Complaint/Reason for Visit:  Chief Complaint   Patient presents with     Problem Visit       HPI:   Scout is a 77 y.o. male who is recent transfer from [facility].  He has a past medical history of congestive heart failure with systolic dysfunction, diabetes, chronic kidney disease stage IV now 5 and hypertension who lives in his own home who was discharged from this TCU in Nov 2017.  He was brought to the ER by police with severe confusion on 2/4/18 and was discharged on 2/7/18 to the TCU.  Per wife he has been noncompliant with medications and has several violent outbursts, though nothing physical that is known. Historically his has ESRD and has refused dialysis.  Per nephrology his baseline creatinine is 3-4, with a level on 4.22 on admission.      On exam today and previously he is cooperative, no adverse behaviors noted. Per our previous discussion discontinued his insulin and attempt to maintain some control of BS, which have been mostly controlled, continue to monitor.  Further he does not want any blood draws so INRs will be maintained in house. He states sleeping well so will discontinue trazodone, monitored sleep, will continue melatonin and start olanzapine at HS. Today, his prior L hip fx is no longer an issue, will dc tramadol and change tylenol to PRN.    Recent hospitalization:  Patient was sent to [facility] per finding him on the floor. Per imaging was found to have L hip fx, initial INR elevated, given FFP per surgery. Also Cr was 6+, stopped lasix and K supplementation. Was admitted on 3/24/18 and discharged on 3/28/18. He now has WBAT on L hip.    Patient denies headache, chest pain, numbness or tingling, shortest of breath, eating or swallowing concerns, nausea or vomiting, diarrhea or bowel abnormalities, or no new integumentary concerns today.  Was previously scheduled for DEXA scan.    Past Medical History:  Past Medical History:   Diagnosis Date     Acute embolic stroke      Aortic stenosis 5/4/2015     Aortic stenosis      Benign Essential Hypertension     Created by Conversion      Chronic Kidney Disease, Stage 3     Created by Conversion      Dementia      Depression      Diabetes mellitus, type II      Diabetes With Renal Manifestations     Created by Conversion      Essential Hypercholesterolemia     Created by Conversion      Type 2 Diabetes Mellitus     Created by Conversion      Vertigo     Created by Conversion            Surgical History:  Past Surgical History:   Procedure Laterality Date     CATARACT EXTRACTION       HIP PINNING Left 3/25/2018    Procedure: LEFT HIP OPEN REDUCTION INTERNAL FIXATION;  Surgeon: Gamaliel Reeder MD;  Location: [facility] OR;  Service:        Family History:   Family History   Problem Relation Age of Onset     Heart disease Mother      Mental illness Father        Social History:    Social History     Social History     Marital status:      Spouse name: N/A     Number of children: N/A     Years of education: N/A     Social History Main Topics     Smoking status: Former Smoker     Quit date: 5/26/1977     Smokeless tobacco: Never Used     Alcohol use No     Drug use: No     Sexual activity: Not on file     Other Topics Concern     Not on file     Social History Narrative          Review of Systems   Constitutional: Negative for activity change, appetite change, diaphoresis, fatigue and fever.        New L hip fx - ongoing therapy   HENT: Negative for congestion, facial swelling and hearing loss.    Eyes: Negative for photophobia, pain and visual disturbance.   Respiratory: Negative for apnea, shortness of breath and wheezing.    Cardiovascular: Negative for chest pain.   Gastrointestinal: Negative for abdominal distention, abdominal pain, blood in stool, constipation and diarrhea.   Endocrine:  Positive for polyuria.   Genitourinary: Positive for frequency. Negative for dysuria.        Retention   Musculoskeletal:        Pain with movement in L leg - improved bed mobility/therapy   Skin:        intact   Allergic/Immunologic: Negative.    Neurological: Negative for dizziness, speech difficulty, weakness and light-headedness.   Hematological: Negative.    Psychiatric/Behavioral: Positive for confusion. Negative for agitation, hallucinations and sleep disturbance. The patient is not nervous/anxious.        Vitals:    05/16/18 1341   BP: 125/68   Pulse: 68   Resp: 18   Temp: 98.3  F (36.8  C)   SpO2: 96%   Weight: 161 lb (73 kg)       Physical Exam   Constitutional: He appears well-developed. No distress.   Pain control per recent L hip fx - improved   HENT:   Head: Normocephalic and atraumatic.   Mouth/Throat: Oropharynx is clear and moist. No oropharyngeal exudate.   Eyes: Pupils are equal, round, and reactive to light. Right eye exhibits no discharge. Left eye exhibits no discharge. No scleral icterus.   Neck: Normal range of motion. Neck supple. No JVD present.   Cardiovascular: Exam reveals no gallop and no friction rub.    Murmur heard.  Reg, 2/6 CARMEN LSB   Pulmonary/Chest: Effort normal and breath sounds normal. No stridor. He has no wheezes. He has no rales.   Dim, RA   Abdominal: Soft. Bowel sounds are normal. He exhibits no distension. There is no tenderness.   No constipation or diarrhea   Genitourinary:   Genitourinary Comments: Deferred   Musculoskeletal: He exhibits no edema, tenderness or deformity.   Now denies pain, will dc tramadol and change tylenol to PRN   Neurological: He is alert. No cranial nerve deficit.   Alert and oriented ×1, no focal deficits   Skin: Skin is warm and dry. He is not diaphoretic.   S/p L hip fx, intact   Psychiatric: He has a normal mood and affect.   No anxiety or depression, no behavioral issues, stubbord   Vitals reviewed.      Medication List:  Current Outpatient  Prescriptions   Medication Sig     acetaminophen (TYLENOL) 500 MG tablet Take 500 mg by mouth every 4 (four) hours as needed.      amiodarone (PACERONE) 200 MG tablet Take 1 tablet (200 mg total) by mouth every other day.     carvedilol (COREG) 25 MG tablet Take 25 mg by mouth 2 (two) times a day with meals. Hold if SBP <120 or pulse <60     docusate sodium (COLACE) 100 MG capsule Take 1 capsule (100 mg total) by mouth 2 (two) times a day.     ferrous sulfate 325 (65 FE) MG tablet Take 1 tablet by mouth daily with breakfast.     hydrALAZINE (APRESOLINE) 10 MG tablet Take 1 tablet (10 mg total) by mouth 4 (four) times a day.     isosorbide mononitrate (IMDUR) 30 MG 24 hr tablet Take 30 mg by mouth daily.     levothyroxine (SYNTHROID, LEVOTHROID) 100 MCG tablet Take 100 mcg by mouth daily.     magnesium oxide (MAG-OX) 400 mg tablet Take 400 mg by mouth daily.     melatonin 3 mg Tab tablet Take 6 mg by mouth at bedtime.     OLANZapine (ZYPREXA) 2.5 MG tablet Take 2.5 mg by mouth at bedtime.     polyethylene glycol (MIRALAX) 17 gram packet Take 1 packet (17 g total) by mouth 2 (two) times a day.     rivastigmine (EXELON) 4.6 mg/24 hr Place 1 patch on the skin daily.     senna-docusate (PERICOLACE) 8.6-50 mg tablet Take 1 tablet by mouth 2 (two) times a day.     tamsulosin (FLOMAX) 0.4 mg Cp24 Take 1 capsule (0.4 mg total) by mouth 2 (two) times a day.     triamcinolone (KENALOG) 0.5 % ointment Apply 1 application topically 2 (two) times a day as needed.      WARFARIN SODIUM (WARFARIN DAILY DOSE) Take by mouth daily with supper. 4/2/18 INR 5.0 Hold 2 & 3, next INR 4/4.  3/31/18 3.37 2.5mg qd  3/28/17 2.42 2.5mg qd.  3/27/18 INR 1.73 had 3.5mg  3/26/18 had 3.5mg  3/25/18 had 5mg       Labs:  No results found for this or any previous visit (from the past 168 hour(s)).    Assessment/Plan:  1.  DM: refuses all insulin which will be d/c'd, monitor BS BID, 80-200s, previously d/c'd oral per renal fx.  2.  HTN: continue  hydralazine, coreg and imdur, BP elevated. Monitor.  3.  CHF: recently lasix was d/c'd, weight   4.  Afib: INR therapeutic at 2.1 on coumadin 1.5mg daily, recheck on 5/22. Continue amiodarone for rate control.  5.  ESRD: last Cr 4.81 with GFR 12, no HD.  6.  Insomnia: refuses trazodone so will dc, sleeps 3-4hrs per night, continue melatonin 6mg, continue zypreza 2.5mg at HS, effective.  7.  Hypomagnesia: last 2.5, decrease mag oxide 400mg daily, monitor.  8.  Hypokalemia: recently d/c'd lasix, recheck K 4.6 on 4/23/18.  9.  Agitation: stable, none recorded, monitor.  10: Elopement: in locked unit, attempted previously though has decreased since hip fx.  11. Dementia: Exelon patch, no change.  12. Hypothyroidism: last TSH 3.37 on 11/17.  13. ABLA: last Hgb 9.2, recheck CBC on 4/2. Adequate stores.  14. Pain management: now dc tramadol again and decrease tylenol to 500mg q4h PRN, monitor response.  15. L hip fx: f/u with ortho as directed, pending DEXA.  16. Vit D def: start D3 2000U daily, recheck in 6 wks.     The care plan has been reviewed and all orders signed. Changes to care plan, if any, as noted. Otherwise, continue care plan of care.       Electronically signed by: Reji Dougherty NP

## 2021-06-19 NOTE — PROGRESS NOTES
[facility]      Facility:    [facility] [[id number]]  Code Status: DNR/DNI      Chief Complaint/Reason for Visit:  Chief Complaint   Patient presents with     Problem Visit       HPI:   Scout is a 77 y.o. male who is recent transfer from [facility].  He has a past medical history of congestive heart failure with systolic dysfunction, diabetes, chronic kidney disease stage IV now 5 and hypertension who lives in his own home who was discharged from this TCU in Nov 2017.  He was brought to the ER by police with severe confusion on 2/4/18 and was discharged on 2/7/18 to the TCU.  Per wife he has been noncompliant with medications and has several violent outbursts, though nothing physical that is known. Historically his has ESRD and has refused dialysis.  Per nephrology his baseline creatinine is 3-4, with a level on 4.22 on admission.      On exam today and previously he is cooperative, no adverse behaviors noted. Per our previous discussion discontinued his insulin and attempt to maintain some control of BS, which have been mostly controlled, continue to monitor.  Further he does not want any blood draws so INRs will be maintained in house. He states sleeping well so will discontinue trazodone, monitored sleep, will continue melatonin and will decrease olanzapine at HS. Today, his prior L hip fx is no longer an issue, will dc tramadol and change tylenol to PRN. He seems to be ambulating well and he has no behavioral issues.     Recent hospitalization:  Patient was sent to [facility] per finding him on the floor. Per imaging was found to have L hip fx, initial INR elevated, given FFP per surgery. Also Cr was 6+, stopped lasix and K supplementation. Was admitted on 3/24/18 and discharged on 3/28/18. He now has WBAT on L hip.    Patient denies headache, chest pain, numbness or tingling, shortest of breath, eating or swallowing concerns, nausea or vomiting,  diarrhea or bowel abnormalities, or no new integumentary concerns today. Was previously scheduled for DEXA scan.    Past Medical History:  Past Medical History:   Diagnosis Date     Acute embolic stroke (H)      Aortic stenosis 5/4/2015     Aortic stenosis      Benign Essential Hypertension     Created by Conversion      Chronic Kidney Disease, Stage 3     Created by Conversion      Dementia      Depression      Diabetes mellitus, type II (H)      Diabetes With Renal Manifestations     Created by Conversion      Essential Hypercholesterolemia     Created by Conversion      Type 2 Diabetes Mellitus     Created by Conversion      Vertigo     Created by Conversion            Surgical History:  Past Surgical History:   Procedure Laterality Date     CATARACT EXTRACTION       HIP PINNING Left 3/25/2018    Procedure: LEFT HIP OPEN REDUCTION INTERNAL FIXATION;  Surgeon: Gamaliel Reeder MD;  Location: [facility] OR;  Service:        Family History:   Family History   Problem Relation Age of Onset     Heart disease Mother      Mental illness Father        Social History:    Social History     Social History     Marital status:      Spouse name: N/A     Number of children: N/A     Years of education: N/A     Social History Main Topics     Smoking status: Former Smoker     Quit date: 5/26/1977     Smokeless tobacco: Never Used     Alcohol use No     Drug use: No     Sexual activity: Not on file     Other Topics Concern     Not on file     Social History Narrative          Review of Systems   Constitutional: Negative for activity change, appetite change, diaphoresis, fatigue and fever.        Does not see too depressed   HENT: Negative for congestion, facial swelling and hearing loss.    Eyes: Negative for photophobia, pain and visual disturbance.   Respiratory: Negative for apnea, shortness of breath and wheezing.    Cardiovascular: Negative for chest pain.   Gastrointestinal: Negative for abdominal distention,  abdominal pain, blood in stool, constipation and diarrhea.   Endocrine: Positive for polyuria.   Genitourinary: Positive for frequency. Negative for dysuria.        Retention   Musculoskeletal:        No pain, sleeps well   Skin:        intact   Allergic/Immunologic: Negative.    Neurological: Negative for dizziness, speech difficulty, weakness and light-headedness.   Hematological: Negative.    Psychiatric/Behavioral: Positive for confusion. Negative for agitation, hallucinations and sleep disturbance. The patient is not nervous/anxious.        Vitals:    08/15/18 1837   BP: 145/72   Pulse: 81   Resp: 18   Temp: 97  F (36.1  C)   SpO2: 93%   Weight: 153 lb (69.4 kg)       Physical Exam   Constitutional: He appears well-developed. No distress.   Pain control per recent L hip fx - improved   HENT:   Head: Normocephalic and atraumatic.   Mouth/Throat: Oropharynx is clear and moist. No oropharyngeal exudate.   Eyes: Pupils are equal, round, and reactive to light. Right eye exhibits no discharge. Left eye exhibits no discharge. No scleral icterus.   Neck: Normal range of motion. Neck supple. No JVD present.   Cardiovascular: Exam reveals no gallop and no friction rub.    Murmur heard.  Reg, 2/6 CARMEN LSB   Pulmonary/Chest: Effort normal and breath sounds normal. No stridor. He has no wheezes. He has no rales.   Dim, RA   Abdominal: Soft. Bowel sounds are normal. He exhibits no distension. There is no tenderness.   No constipation or diarrhea   Genitourinary:   Genitourinary Comments: Deferred   Musculoskeletal: He exhibits no edema, tenderness or deformity.   Now denies pain, will dc tramadol and change tylenol to PRN   Neurological: He is alert. No cranial nerve deficit.   Alert and oriented ×1, no focal deficits   Skin: Skin is warm and dry. He is not diaphoretic.   S/p L hip fx, intact   Psychiatric: He has a normal mood and affect.   No anxiety or depression, no behavioral issues, stubbord   Vitals  reviewed.      Medication List:  Current Outpatient Prescriptions   Medication Sig     OLANZapine (ZYPREXA) 2.5 MG tablet Take 1.25 mg by mouth at bedtime.     acetaminophen (TYLENOL) 500 MG tablet Take 500 mg by mouth every 4 (four) hours as needed.      amiodarone (PACERONE) 200 MG tablet Take 1 tablet (200 mg total) by mouth every other day.     carvedilol (COREG) 25 MG tablet Take 25 mg by mouth 2 (two) times a day with meals. Hold if SBP <120 or pulse <60     cholecalciferol, vitamin D3, 1,000 unit tablet Take 2,000 Units by mouth daily.     docusate sodium (COLACE) 100 MG capsule Take 1 capsule (100 mg total) by mouth 2 (two) times a day.     ferrous sulfate 325 (65 FE) MG tablet Take 1 tablet by mouth daily with breakfast.     hydrALAZINE (APRESOLINE) 10 MG tablet Take 1 tablet (10 mg total) by mouth 4 (four) times a day.     isosorbide mononitrate (IMDUR) 30 MG 24 hr tablet Take 30 mg by mouth daily.     levothyroxine (SYNTHROID, LEVOTHROID) 100 MCG tablet Take 100 mcg by mouth daily.     magnesium oxide (MAG-OX) 400 mg tablet Take 400 mg by mouth daily.     melatonin 3 mg Tab tablet Take 6 mg by mouth at bedtime.     polyethylene glycol (MIRALAX) 17 gram packet Take 1 packet (17 g total) by mouth 2 (two) times a day.     rivastigmine (EXELON) 4.6 mg/24 hr Place 1 patch on the skin daily.     senna-docusate (PERICOLACE) 8.6-50 mg tablet Take 1 tablet by mouth 2 (two) times a day.     tamsulosin (FLOMAX) 0.4 mg Cp24 Take 1 capsule (0.4 mg total) by mouth 2 (two) times a day.     triamcinolone (KENALOG) 0.5 % ointment Apply 1 application topically 2 (two) times a day as needed.      WARFARIN SODIUM (WARFARIN DAILY DOSE) Take by mouth daily with supper. 4/2/18 INR 5.0 Hold 2 & 3, next INR 4/4.  3/31/18 3.37 2.5mg qd  3/28/17 2.42 2.5mg qd.  3/27/18 INR 1.73 had 3.5mg  3/26/18 had 3.5mg  3/25/18 had 5mg       Labs:  Results for orders placed or performed in visit on 07/20/18   Basic Metabolic Panel   Result  Value Ref Range    Sodium 142 136 - 145 mmol/L    Potassium 3.6 3.5 - 5.0 mmol/L    Chloride 111 (H) 98 - 107 mmol/L    CO2 22 22 - 31 mmol/L    Anion Gap, Calculation 9 5 - 18 mmol/L    Glucose 103 70 - 125 mg/dL    Calcium 8.7 8.5 - 10.5 mg/dL    BUN 43 (H) 8 - 28 mg/dL    Creatinine 5.19 (H) 0.70 - 1.30 mg/dL    GFR MDRD Af Amer 13 (L) >60 mL/min/1.73m2    GFR MDRD Non Af Amer 11 (L) >60 mL/min/1.73m2     Lab Results   Component Value Date    WBC 8.2 07/20/2018    HGB 10.1 (L) 07/20/2018    HCT 30.1 (L) 07/20/2018    MCV 97 07/20/2018     07/20/2018     Vitamin D, Total (25-Hydroxy)   Date Value Ref Range Status   07/20/2018 27.6 (L) 30.0 - 80.0 ng/mL Final     Lab Results   Component Value Date    TSH 0.87 04/23/2018     Lab Results   Component Value Date    INR 3.37 (H) 03/31/2018    INR 2.42 (H) 03/28/2018    INR 1.73 (H) 03/27/2018       Assessment/Plan:  1.  DM: refuses all insulin which will be d/c'd, monitor BS BID, 80-200s, previously d/c'd oral per renal fx.  2.  HTN: continue hydralazine, coreg and imdur, BP elevated at times. Monitor.  3.  CHF: recently lasix was d/c'd, weight decreasing  4.  Afib: INR therapeutic at 2.5 on coumadin 2.25mg daily, recheck on 9/24. Continue amiodarone for rate control.  5.  ESRD: last Cr 5.19 with GFR 11, no HD.  6.  Insomnia: refuses trazodone so will dc, sleeps 3-4hrs per night, continue melatonin 6mg, decrease zypreza to 1.25mg at HS, will monitor sleep x1wk  7.  Hypomagnesia: last 2.5, decrease mag oxide 400mg daily, monitor.  8.  Hypokalemia: recently d/c'd lasix, recheck K 3.6 on 7/20/18.  9.  Agitation: stable, none recorded, monitor.  10: Elopement: in locked unit, attempted previously though has decreased since hip fx.  11. Dementia: Exelon patch, no change.  12. Hypothyroidism: last TSH 0.87 on 4/23/18.  13. ABLA: last Hgb 10.1 on 7/20/18. Adequate stores.  14. Pain management: now dc tramadol again and decrease tylenol to 500mg q4h PRN, monitor  response.  15. L hip fx: f/u with ortho as directed, pending DEXA.  16. Vit D def: increase to D3 3000U daily, last 27.6, will recheck in 6wks    The care plan has been reviewed and all orders signed. Changes to care plan, if any, as noted. Otherwise, continue care plan of care.       Electronically signed by: Reji Dougherty NP

## 2021-06-19 NOTE — PROGRESS NOTES
[facility]      Code Status:  DNR  Visit Type: Review Of Multiple Medical Conditions     Facility:  [facility] [[id number]]           History of Present Illness: Scout Batista is a 77 y.o. male who is currently a resident of long-term care .  This is an elderly gentleman with advanced renal failure along with dementia as well as diabetes and congestive heart failure  He was in the TCU recently after he was admitted from the hospital with acute encephalopathy.  He remains in elopement risk and had made several attempts to leave the [facility] transitional care unit where he was at  He was sent to the emergency room  after a fall with left hip pain and ecchymoses.  Imaging was negative for any fracture.  Pt remains exit seeking .he will refuse cares often    Past Medical History:   Diagnosis Date     Acute embolic stroke (H)      Aortic stenosis 5/4/2015     Aortic stenosis      Benign Essential Hypertension     Created by Conversion      Chronic Kidney Disease, Stage 3     Created by Conversion      Dementia      Depression      Diabetes mellitus, type II (H)      Diabetes With Renal Manifestations     Created by Conversion      Essential Hypercholesterolemia     Created by Conversion      Type 2 Diabetes Mellitus     Created by Conversion      Vertigo     Created by Conversion      Past Surgical History:   Procedure Laterality Date     CATARACT EXTRACTION       HIP PINNING Left 3/25/2018    Procedure: LEFT HIP OPEN REDUCTION INTERNAL FIXATION;  Surgeon: Gamaliel Reeder MD;  Location: [facility] Main OR;  Service:      Family History   Problem Relation Age of Onset     Heart disease Mother      Mental illness Father      Social History     Social History     Marital status:      Spouse name: N/A     Number of children: N/A     Years of education: N/A     Occupational History     Not on file.     Social History Main Topics     Smoking status: Former Smoker     Quit  date: 5/26/1977     Smokeless tobacco: Never Used     Alcohol use No     Drug use: No     Sexual activity: Not on file     Other Topics Concern     Not on file     Social History Narrative     Current Outpatient Prescriptions   Medication Sig Dispense Refill     acetaminophen (TYLENOL) 500 MG tablet Take 500 mg by mouth every 4 (four) hours as needed.        amiodarone (PACERONE) 200 MG tablet Take 1 tablet (200 mg total) by mouth every other day. 90 tablet 2     carvedilol (COREG) 25 MG tablet Take 25 mg by mouth 2 (two) times a day with meals. Hold if SBP <120 or pulse <60       cholecalciferol, vitamin D3, 1,000 unit tablet Take 2,000 Units by mouth daily.       docusate sodium (COLACE) 100 MG capsule Take 1 capsule (100 mg total) by mouth 2 (two) times a day.  0     ferrous sulfate 325 (65 FE) MG tablet Take 1 tablet by mouth daily with breakfast.       hydrALAZINE (APRESOLINE) 10 MG tablet Take 1 tablet (10 mg total) by mouth 4 (four) times a day.  0     isosorbide mononitrate (IMDUR) 30 MG 24 hr tablet Take 30 mg by mouth daily.       levothyroxine (SYNTHROID, LEVOTHROID) 100 MCG tablet Take 100 mcg by mouth daily.       magnesium oxide (MAG-OX) 400 mg tablet Take 400 mg by mouth daily.       melatonin 3 mg Tab tablet Take 6 mg by mouth at bedtime.       OLANZapine (ZYPREXA) 2.5 MG tablet Take 2.5 mg by mouth at bedtime.       polyethylene glycol (MIRALAX) 17 gram packet Take 1 packet (17 g total) by mouth 2 (two) times a day.  0     rivastigmine (EXELON) 4.6 mg/24 hr Place 1 patch on the skin daily. 30 patch 0     senna-docusate (PERICOLACE) 8.6-50 mg tablet Take 1 tablet by mouth 2 (two) times a day.  0     tamsulosin (FLOMAX) 0.4 mg Cp24 Take 1 capsule (0.4 mg total) by mouth 2 (two) times a day.  0     triamcinolone (KENALOG) 0.5 % ointment Apply 1 application topically 2 (two) times a day as needed.        WARFARIN SODIUM (WARFARIN DAILY DOSE) Take by mouth daily with supper. 4/2/18 INR 5.0 Hold 2 & 3,  next INR 4/4.  3/31/18 3.37 2.5mg qd  3/28/17 2.42 2.5mg qd.  3/27/18 INR 1.73 had 3.5mg  3/26/18 had 3.5mg  3/25/18 had 5mg       No current facility-administered medications for this visit.      No Known Allergies      Review of Systems:    Constitutional: Negative.  Negative for fever, chills,HAS  activity change, appetite change and fatigue.   HENT: Negative for congestion and facial swelling.    Eyes: Negative for photophobia, redness and visual disturbance.   Respiratory: Negative for cough and chest tightness.    Cardiovascular: Negative for chest pain, palpitations and leg swelling.   Gastrointestinal: Negative for nausea, diarrhea, constipation, blood in stool and abdominal distention.   Genitourinary: Negative.    Musculoskeletal: Negative.   AMBULATES ON HIS OWN  Skin: Negative.    Neurological: Negative for dizziness, tremors, syncope, weakness, light-headedness and headaches.   Hematological: Does not bruise/bleed easily.   Psychiatric/Behavioral: Negative.    Confused and alert and oriented to self only    Vitals:    07/11/18 1117   BP: 152/73   Pulse: 73   Temp: 98  F (36.7  C)       Physical Exam:    GENERAL: no acute distress. Cooperative in conversation.   HEENT: pupils are equal, round and reactive. Oral mucosa is moist and intact.  RESP:Chest symmetric. Regular respiratory rate. No stridor.  CVS: S1S2  ABD: Nondistended, soft.  EXTREMITIES: No lower extremity edema.   NEURO: non focal. Alert and oriented xSELF.   PSYCH: within normal limits. No depression or anxiety.  SKIN: warm dry intact       Labs:    INR 1.9  .    Lab Results   Component Value Date    HGBA1C 5.8 04/23/2018       Assessment/Plan:    Dementia with a slums of 9/30   Patient remains in memory care unit with several elopement events    A. wong continues on his anticoagulation monitor INRs  Currently rate controlled on Coreg along with amiodarone.     chronic kidney disease stage IV.   His last creatinine was 4.22 in the past he has  refused hemodialysis.  Creatinine was close to baseline at 4.3 in the ER recently.  R/c labs in 2 weeks     Congestive heart failure chronic with systolic dysfunction appear to be euvolemic  He is on Lasix at a lower dose of 40 daily     Hypertension on multiple medications including Coreg, hydralazine     Diabetes type 2   He has infrequent blood sugar checks due to patient refusal.     Hyperlipidemia he is on simvastatin     Hypothyroidism on replacement Synthroid     BPH given Flomax     Depression     Anemia of chronic kidney disease      Nonischemic cardiomyopathy with an ejection fraction of 40% on Coreg     Mild aortic stenoses  Debilitation he is a modified independence walking about 200 feet.  Balance score Ricketts is 30/56 indicating a medium fall risk.  He has been stable but weight loss of almost 20 pounds as room documented will have staff reweigh him  R/c labs in 2 wks.        Electronically signed by: CARA Carey  This progress note was completed using Dragon software and there may be grammatical errors.

## 2021-06-20 NOTE — PROGRESS NOTES
[facility]      Facility:    [facility] [[id number]]  Code Status: DNR/DNI      Chief Complaint/Reason for Visit:  Chief Complaint   Patient presents with     Problem Visit       HPI:   Scout is a 78 y.o. male who is recent transfer from [facility].  He has a past medical history of congestive heart failure with systolic dysfunction, diabetes, chronic kidney disease stage IV now 5 and hypertension who lives in his own home who was discharged from this TCU in Nov 2017.  He was brought to the ER by police with severe confusion on 2/4/18 and was discharged on 2/7/18 to the TCU.  Per wife he has been noncompliant with medications and has several violent outbursts, though nothing physical that is known. Historically his has ESRD and has refused dialysis.  Per nephrology his baseline creatinine is 3-4, with a level on 4.22 on admission.      On exam today and previously he is cooperative, no adverse behaviors noted. Per our previous discussion discontinued his insulin and attempt to maintain some control of BS, which have been mostly controlled, continue to monitor.   He states sleeping well so will discontinue trazodone, monitored sleep, will continue melatonin and will further dc olanzapine at HS. Today, his prior L hip fx is no longer an issue, will dc tramadol and change tylenol to PRN. He seems to be ambulating well and he has no behavioral issues.  Nursing reports he is sleeping more with less energy, probably d/t progressive renal dx.  He may qualify for hospice.    Recent hospitalization:  Patient was sent to [facility] per finding him on the floor. Per imaging was found to have L hip fx, initial INR elevated, given FFP per surgery. Also Cr was 6+, stopped lasix and K supplementation. Was admitted on 3/24/18 and discharged on 3/28/18. He now has WBAT on L hip.    Patient denies headache, chest pain, numbness or tingling, shortest of breath, eating or  swallowing concerns, nausea or vomiting, diarrhea or bowel abnormalities, or no new integumentary concerns today. Was previously scheduled for DEXA scan.    Past Medical History:  Past Medical History:   Diagnosis Date     Acute embolic stroke (H)      Aortic stenosis 5/4/2015     Aortic stenosis      Benign Essential Hypertension     Created by Conversion      Chronic Kidney Disease, Stage 3     Created by Conversion      Dementia      Depression      Diabetes mellitus, type II (H)      Diabetes With Renal Manifestations     Created by Conversion      Essential Hypercholesterolemia     Created by Conversion      Type 2 Diabetes Mellitus     Created by Conversion      Vertigo     Created by Conversion            Surgical History:  Past Surgical History:   Procedure Laterality Date     CATARACT EXTRACTION       HIP PINNING Left 3/25/2018    Procedure: LEFT HIP OPEN REDUCTION INTERNAL FIXATION;  Surgeon: Gamaliel Reeder MD;  Location: [facility];  Service:        Family History:   Family History   Problem Relation Age of Onset     Heart disease Mother      Mental illness Father        Social History:    Social History     Social History     Marital status:      Spouse name: N/A     Number of children: N/A     Years of education: N/A     Social History Main Topics     Smoking status: Former Smoker     Quit date: 5/26/1977     Smokeless tobacco: Never Used     Alcohol use No     Drug use: No     Sexual activity: Not on file     Other Topics Concern     Not on file     Social History Narrative          Review of Systems   Constitutional: Negative for activity change, appetite change, diaphoresis, fatigue and fever.        Somewhat weaker, less energy   HENT: Negative for congestion, facial swelling and hearing loss.    Eyes: Negative for photophobia, pain and visual disturbance.   Respiratory: Negative for apnea, shortness of breath and wheezing.    Cardiovascular: Negative for chest pain.    Gastrointestinal: Negative for abdominal distention, abdominal pain, blood in stool, constipation and diarrhea.   Endocrine: Negative for polyuria.   Genitourinary: Positive for frequency. Negative for dysuria.        Retention   Musculoskeletal:        No pain, sleeps well   Skin:        intact   Allergic/Immunologic: Negative.    Neurological: Negative for dizziness, speech difficulty, weakness and light-headedness.   Hematological: Negative.    Psychiatric/Behavioral: Positive for confusion. Negative for agitation, hallucinations and sleep disturbance. The patient is not nervous/anxious.        Vitals:    09/15/18 1107   BP: 165/80   Pulse: 73   Resp: 18   Temp: 98.5  F (36.9  C)   SpO2: 99%   Weight: 161 lb (73 kg)       Physical Exam   Constitutional: He appears well-developed. No distress.   No acute issues   HENT:   Head: Normocephalic and atraumatic.   Mouth/Throat: Oropharynx is clear and moist. No oropharyngeal exudate.   Eyes: Pupils are equal, round, and reactive to light. Right eye exhibits no discharge. Left eye exhibits no discharge. No scleral icterus.   Neck: Normal range of motion. Neck supple. No JVD present.   Cardiovascular: Exam reveals no gallop and no friction rub.    Murmur heard.  Reg, 2/6 CARMEN LSB   Pulmonary/Chest: Effort normal and breath sounds normal. No stridor. He has no wheezes. He has no rales.   Dim, RA   Abdominal: Soft. Bowel sounds are normal. He exhibits no distension. There is no tenderness.   No constipation or diarrhea   Genitourinary:   Genitourinary Comments: Deferred   Musculoskeletal: He exhibits no edema, tenderness or deformity.   Now denies pain, will dc tramadol and change tylenol to PRN   Neurological: He is alert. No cranial nerve deficit.   Alert and oriented ×1, no focal deficits   Skin: Skin is warm and dry. He is not diaphoretic.   S/p L hip fx, intact   Psychiatric: He has a normal mood and affect.   No anxiety or depression, no behavioral issues, stubbord    Vitals reviewed.      Medication List:  Current Outpatient Prescriptions   Medication Sig     acetaminophen (TYLENOL) 500 MG tablet Take 500 mg by mouth every 4 (four) hours as needed.      amiodarone (PACERONE) 200 MG tablet Take 1 tablet (200 mg total) by mouth every other day.     carvedilol (COREG) 25 MG tablet Take 25 mg by mouth 2 (two) times a day with meals. Hold if SBP <120 or pulse <60     cholecalciferol, vitamin D3, 1,000 unit tablet Take 3,000 Units by mouth daily.      docusate sodium (COLACE) 100 MG capsule Take 1 capsule (100 mg total) by mouth 2 (two) times a day.     ferrous sulfate 325 (65 FE) MG tablet Take 1 tablet by mouth daily with breakfast.     hydrALAZINE (APRESOLINE) 10 MG tablet Take 1 tablet (10 mg total) by mouth 4 (four) times a day.     isosorbide mononitrate (IMDUR) 30 MG 24 hr tablet Take 30 mg by mouth daily.     levothyroxine (SYNTHROID, LEVOTHROID) 100 MCG tablet Take 100 mcg by mouth daily.     magnesium oxide (MAG-OX) 400 mg tablet Take 400 mg by mouth daily.     melatonin 3 mg Tab tablet Take 6 mg by mouth at bedtime.     polyethylene glycol (MIRALAX) 17 gram packet Take 1 packet (17 g total) by mouth 2 (two) times a day.     rivastigmine (EXELON) 4.6 mg/24 hr Place 1 patch on the skin daily.     senna-docusate (PERICOLACE) 8.6-50 mg tablet Take 1 tablet by mouth 2 (two) times a day.     tamsulosin (FLOMAX) 0.4 mg Cp24 Take 1 capsule (0.4 mg total) by mouth 2 (two) times a day.     triamcinolone (KENALOG) 0.5 % ointment Apply 1 application topically 2 (two) times a day as needed.      WARFARIN SODIUM (WARFARIN DAILY DOSE) Take by mouth daily with supper. 4/2/18 INR 5.0 Hold 2 & 3, next INR 4/4.  3/31/18 3.37 2.5mg qd  3/28/17 2.42 2.5mg qd.  3/27/18 INR 1.73 had 3.5mg  3/26/18 had 3.5mg  3/25/18 had 5mg       Labs:  Results for orders placed or performed in visit on 07/20/18   Basic Metabolic Panel   Result Value Ref Range    Sodium 142 136 - 145 mmol/L    Potassium 3.6 3.5  - 5.0 mmol/L    Chloride 111 (H) 98 - 107 mmol/L    CO2 22 22 - 31 mmol/L    Anion Gap, Calculation 9 5 - 18 mmol/L    Glucose 103 70 - 125 mg/dL    Calcium 8.7 8.5 - 10.5 mg/dL    BUN 43 (H) 8 - 28 mg/dL    Creatinine 5.19 (H) 0.70 - 1.30 mg/dL    GFR MDRD Af Amer 13 (L) >60 mL/min/1.73m2    GFR MDRD Non Af Amer 11 (L) >60 mL/min/1.73m2     Lab Results   Component Value Date    WBC 8.2 07/20/2018    HGB 10.1 (L) 07/20/2018    HCT 30.1 (L) 07/20/2018    MCV 97 07/20/2018     07/20/2018     Vitamin D, Total (25-Hydroxy)   Date Value Ref Range Status   09/04/2018 33.0 30.0 - 80.0 ng/mL Final     Lab Results   Component Value Date    TSH 0.87 04/23/2018     Lab Results   Component Value Date    INR 3.37 (H) 03/31/2018    INR 2.42 (H) 03/28/2018    INR 1.73 (H) 03/27/2018       Assessment/Plan:  1.  DM: refuses all insulin which will be d/c'd, monitor BS BID, 80-200s, previously d/c'd oral per renal fx.  2.  HTN: continue hydralazine, coreg and imdur, BP elevated at times. Monitor.  3.  CHF: recently lasix was d/c'd, weight decreasing  4.  Afib: INR therapeutic at 2.5 on coumadin 2.25mg daily, recheck on 9/24. Continue amiodarone for rate control.  5.  ESRD: last Cr 5.19 with GFR 11, no HD.  6.  Insomnia: refuses trazodone so will dc, sleeps 3-4hrs per night, continue melatonin 6mg, dc zypreza at HS, monitor for escalation of behaviors  7.  Hypomagnesia: last 2.5, decrease mag oxide 400mg daily, monitor.  8.  Hypokalemia: recently d/c'd lasix, recheck K 3.6 on 7/20/18.  9.  Agitation: stable, none recorded, monitor.  10: Elopement: in locked unit, attempted previously though has decreased since hip fx.  11. Dementia: Exelon patch, no change.  12. Hypothyroidism: last TSH 0.87 on 4/23/18.  13. ABLA: last Hgb 10.1 on 7/20/18. Adequate stores.  14. Pain management: now dc tramadol again and decrease tylenol to 500mg q4h PRN, monitor response.  15. L hip fx: f/u with ortho as directed, pending DEXA.  16. Vit D def:  increase to D3 3000U daily, last 33.0 on 9/4/18     The care plan has been reviewed and all orders signed. Changes to care plan, if any, as noted. Otherwise, continue care plan of care.       Electronically signed by: Reji Dougherty NP

## 2021-06-21 NOTE — PROGRESS NOTES
[facility]      Code Status:  DNR/DNI  Visit Type: H & P     Facility:  [facility] [[id number]]           History of Present Illness: Scout Batista is a 78 y.o. male  who is currently a resident of long-term care .  This is an elderly gentleman with advanced renal failure along with dementia as well as diabetes and congestive heart failure .  He was admitted to the hospital after a fall and diagnosed with right intertrochanteric hip fracture  Orthopedics saw him and he underwent an intramedullary nail placement postoperatively he is not reporting much pain.  Calhoun catheter was put in for comfort he has end-stage renal disease with a creatinine around 5 and chronic anemia with a hemoglobin of 7.4 upon discharge.  Patient in the past several times and indicated that he did not want dialysis in light of his ongoing dementia and falls with recent hip fracture and declining medical condition he was placed on hospice and discharge back to the nursing home.  He is currently bedbound and he is not eating much.  As per nursing he was reporting some pain yesterday    Past Medical History:   Diagnosis Date     Acute embolic stroke (H)      Aortic stenosis 5/4/2015     Aortic stenosis      Benign Essential Hypertension     Created by Conversion      Chronic Kidney Disease, Stage 3     Created by Conversion      Dementia      Depression      Diabetes mellitus, type II (H)      Diabetes With Renal Manifestations     Created by Conversion      Essential Hypercholesterolemia     Created by Conversion      Type 2 Diabetes Mellitus     Created by Conversion      Vertigo     Created by Conversion      Past Surgical History:   Procedure Laterality Date     CATARACT EXTRACTION       HIP PINNING Left 3/25/2018    Procedure: LEFT HIP OPEN REDUCTION INTERNAL FIXATION;  Surgeon: Gamaliel Reeder MD;  Location: [facility];  Service:      HIP PINNING Right 10/22/2018    Procedure: RIGHT  HIP INTRAMEDULLARY NAILING;  Surgeon: Richard Erwin MD;  Location: [facility] Main OR;  Service:      Family History   Problem Relation Age of Onset     Heart disease Mother      Mental illness Father      Social History     Social History     Marital status:      Spouse name: N/A     Number of children: N/A     Years of education: N/A     Occupational History     Not on file.     Social History Main Topics     Smoking status: Former Smoker     Quit date: 5/26/1977     Smokeless tobacco: Never Used     Alcohol use No     Drug use: No     Sexual activity: Not on file     Other Topics Concern     Not on file     Social History Narrative     Current Outpatient Prescriptions   Medication Sig Dispense Refill     acetaminophen (TYLENOL) 500 MG tablet Take 500 mg by mouth every 4 (four) hours as needed.        amiodarone (PACERONE) 200 MG tablet Take 1 tablet (200 mg total) by mouth every other day. 90 tablet 2     aspirin 325 MG tablet Take 1 tablet (325 mg total) by mouth 2 (two) times a day.  0     carvedilol (COREG) 25 MG tablet Take 25 mg by mouth 2 (two) times a day with meals. Hold if SBP <120 or pulse <60       cholecalciferol, vitamin D3, 1,000 unit tablet Take 3,000 Units by mouth daily.        citalopram (CELEXA) 20 MG tablet Take 20 mg by mouth every morning.       docusate sodium (COLACE) 100 MG capsule Take 1 capsule (100 mg total) by mouth 2 (two) times a day.  0     ferrous sulfate 325 (65 FE) MG tablet Take 1 tablet by mouth daily with breakfast.       hydrALAZINE (APRESOLINE) 10 MG tablet Take 1 tablet (10 mg total) by mouth 4 (four) times a day.  0     isosorbide mononitrate (IMDUR) 30 MG 24 hr tablet Take 30 mg by mouth daily.       levothyroxine (SYNTHROID, LEVOTHROID) 100 MCG tablet Take 100 mcg by mouth daily.       magnesium oxide (MAG-OX) 400 mg tablet Take 400 mg by mouth daily.       melatonin 3 mg Tab tablet Take 6 mg by mouth at bedtime.       morphine 2.5 MG solutab Place 2.5 mg  under the tongue every hour as needed for pain (for moderate to severe pain or dyspnea).       nystatin (MYCOSTATIN) powder Apply 1 application topically 2 (two) times a day. Apply to groin topically every day and evening       polyethylene glycol (MIRALAX) 17 gram packet Take 1 packet (17 g total) by mouth 2 (two) times a day.  0     rivastigmine (EXELON) 4.6 mg/24 hr Place 1 patch on the skin daily. 30 patch 0     senna-docusate (PERICOLACE) 8.6-50 mg tablet Take 1 tablet by mouth 2 (two) times a day.  0     tamsulosin (FLOMAX) 0.4 mg Cp24 Take 1 capsule (0.4 mg total) by mouth 2 (two) times a day.  0     triamcinolone (KENALOG) 0.5 % ointment Apply 1 application topically 2 (two) times a day as needed.        No current facility-administered medications for this visit.      No Known Allergies      Review of Systems:    Constitutional: Negative.  Negative for fever, chills, has activity change, appetite change and fatigue.   HENT: Negative for congestion and facial swelling.    Eyes: Negative for photophobia, redness and visual disturbance.   Respiratory: Negative for cough and chest tightness.    Cardiovascular: Negative for chest pain, palpitations and leg swelling.   Gastrointestinal: Negative for nausea, diarrhea, constipation, blood in stool and abdominal distention.   Genitourinary: Negative.    Musculoskeletal: Negative.  pain in rt hip  Skin: Negative.    Neurological: Negative for dizziness, tremors, syncope, weakness, light-headedness and headaches.   Hematological: Does not bruise/bleed easily.   Psychiatric/Behavioral: Negative.  confused  Blood pressure 160/74 temp 97 pulse 65    Physical Exam:    GENERAL: no acute distress. Cooperative in conversation.   HEENT: pupils are equal, round and reactive. Oral mucosa is moist and intact.  RESP:Chest symmetric. Regular respiratory rate. No stridor.  CVS: S1S2  ABD: Nondistended, soft.  EXTREMITIES: No lower extremity edema. Rt hip with intact  incision  NEURO: non focal. Alert and oriented x3.  Confused with poor recall  PSYCH: within normal limits. No depression or anxiety.  SKIN: warm dry intact     Labs:    Recent Results (from the past 240 hour(s))   Basic Metabolic Panel   Result Value Ref Range    Sodium 136 136 - 145 mmol/L    Potassium 5.2 (H) 3.5 - 5.0 mmol/L    Chloride 104 98 - 107 mmol/L    CO2 22 22 - 31 mmol/L    Anion Gap, Calculation 10 5 - 18 mmol/L    Glucose 104 70 - 125 mg/dL    Calcium 8.7 8.5 - 10.5 mg/dL    BUN 60 (H) 8 - 28 mg/dL    Creatinine 5.60 (H) 0.70 - 1.30 mg/dL    GFR MDRD Af Amer 12 (L) >60 mL/min/1.73m2    GFR MDRD Non Af Amer 10 (L) >60 mL/min/1.73m2   INR   Result Value Ref Range    INR 1.19 (H) 0.90 - 1.10   HM1 (CBC with Diff)   Result Value Ref Range    WBC 13.1 (H) 4.0 - 11.0 thou/uL    RBC 2.59 (L) 4.40 - 6.20 mill/uL    Hemoglobin 8.4 (L) 14.0 - 18.0 g/dL    Hematocrit 25.3 (L) 40.0 - 54.0 %    MCV 98 80 - 100 fL    MCH 32.4 27.0 - 34.0 pg    MCHC 33.2 32.0 - 36.0 g/dL    RDW 12.6 11.0 - 14.5 %    Platelets 177 140 - 440 thou/uL    MPV 8.5 8.5 - 12.5 fL    Neutrophils % 80 (H) 50 - 70 %    Lymphocytes % 7 (L) 20 - 40 %    Monocytes % 11 (H) 2 - 10 %    Eosinophils % 2 0 - 6 %    Basophils % 0 0 - 2 %    Neutrophils Absolute 10.3 (H) 2.0 - 7.7 thou/uL    Lymphocytes Absolute 1.0 0.8 - 4.4 thou/uL    Monocytes Absolute 1.4 (H) 0.0 - 0.9 thou/uL    Eosinophils Absolute 0.2 0.0 - 0.4 thou/uL    Basophils Absolute 0.0 0.0 - 0.2 thou/uL   ECG 12 lead nursing unit performed   Result Value Ref Range    SYSTOLIC BLOOD PRESSURE 152 mmHg    DIASTOLIC BLOOD PRESSURE 70 mmHg    VENTRICULAR RATE 69 BPM    ATRIAL RATE 69 BPM    P-R INTERVAL 190 ms    QRS DURATION 132 ms    Q-T INTERVAL 448 ms    QTC CALCULATION (BEZET) 480 ms    P Axis 52 degrees    R AXIS -60 degrees    T AXIS 48 degrees    MUSE DIAGNOSIS       Normal sinus rhythm  Left bundle branch block  Abnormal ECG  When compared with ECG of 24-MAR-2018  11:49,  Nonspecific T wave abnormality has replaced inverted T waves in Inferior leads  Confirmed by SHEILA ORTEGA MD LOC:[facility] ([id number]) on 10/22/2018 3:38:12 PM     Type and Screen   Result Value Ref Range    ABORh O NEG     Antibody Screen Negative Negative   POCT Glucose   Result Value Ref Range    Glucose,  mg/dL   Basic Metabolic Panel   Result Value Ref Range    Sodium 136 136 - 145 mmol/L    Potassium 4.6 3.5 - 5.0 mmol/L    Chloride 103 98 - 107 mmol/L    CO2 24 22 - 31 mmol/L    Anion Gap, Calculation 9 5 - 18 mmol/L    Glucose 98 70 - 125 mg/dL    Calcium 8.7 8.5 - 10.5 mg/dL    BUN 57 (H) 8 - 28 mg/dL    Creatinine 5.53 (H) 0.70 - 1.30 mg/dL    GFR MDRD Af Amer 12 (L) >60 mL/min/1.73m2    GFR MDRD Non Af Amer 10 (L) >60 mL/min/1.73m2   HM2(CBC W/O DIFF)   Result Value Ref Range    WBC 12.2 (H) 4.0 - 11.0 thou/uL    RBC 2.50 (L) 4.40 - 6.20 mill/uL    Hemoglobin 7.9 (L) 14.0 - 18.0 g/dL    Hematocrit 24.5 (L) 40.0 - 54.0 %    MCV 98 80 - 100 fL    MCH 31.6 27.0 - 34.0 pg    MCHC 32.2 32.0 - 36.0 g/dL    RDW 12.7 11.0 - 14.5 %    Platelets 168 140 - 440 thou/uL    MPV 8.4 (L) 8.5 - 12.5 fL   Iron and Transferrin Iron Binding Capacity   Result Value Ref Range    Iron 16 (L) 42 - 175 ug/dL    Transferrin 160 (L) 212 - 360 mg/dL    Transferrin Saturation, Calculated 8 (L) 20 - 50 %    Transferrin IBC, Calculated 200 (L) 313 - 563 ug/dL   BNP(B-type Natriuretic Peptide)   Result Value Ref Range     (H) 0 - 80 pg/mL   POCT Glucose   Result Value Ref Range    Glucose, POC 90 mg/dL   POCT Glucose   Result Value Ref Range    Glucose,  mg/dL   POCT Glucose   Result Value Ref Range    Glucose,  mg/dL   POCT Glucose   Result Value Ref Range    Glucose,  mg/dL   Potassium   Result Value Ref Range    Potassium 4.8 3.5 - 5.0 mmol/L   Glycosylated Hemoglobin A1C   Result Value Ref Range    Hemoglobin A1c 5.4 4.2 - 6.1 %   HM2(CBC w/o Differential)   Result Value Ref Range    WBC 12.3 (H) 4.0  - 11.0 thou/uL    RBC 2.30 (L) 4.40 - 6.20 mill/uL    Hemoglobin 7.4 (L) 14.0 - 18.0 g/dL    Hematocrit 22.5 (L) 40.0 - 54.0 %    MCV 98 80 - 100 fL    MCH 32.2 27.0 - 34.0 pg    MCHC 32.9 32.0 - 36.0 g/dL    RDW 12.5 11.0 - 14.5 %    Platelets 161 140 - 440 thou/uL    MPV 8.9 8.5 - 12.5 fL   Basic Metabolic Panel   Result Value Ref Range    Sodium 136 136 - 145 mmol/L    Potassium 5.1 (H) 3.5 - 5.0 mmol/L    Chloride 106 98 - 107 mmol/L    CO2 22 22 - 31 mmol/L    Anion Gap, Calculation 8 5 - 18 mmol/L    Glucose 183 (H) 70 - 125 mg/dL    Calcium 8.5 8.5 - 10.5 mg/dL    BUN 60 (H) 8 - 28 mg/dL    Creatinine 5.03 (H) 0.70 - 1.30 mg/dL    GFR MDRD Af Amer 14 (L) >60 mL/min/1.73m2    GFR MDRD Non Af Amer 11 (L) >60 mL/min/1.73m2   POCT Glucose   Result Value Ref Range    Glucose,  mg/dL   POCT Glucose   Result Value Ref Range    Glucose,  mg/dL     Xr Femur Right 2 Vws    Result Date: 10/21/2018  XR FEMUR RIGHT 2 VWS 10/21/2018 2:29 PM INDICATION: Fall, thigh pain COMPARISON: None. FINDINGS: Mildly comminuted intertrochanteric fracture with associated moderate varus deformity. No other fractures. No dislocations. NOTE: ABNORMAL REPORT THE DICTATION ABOVE DESCRIBES AN ABNORMALITY FOR WHICH FOLLOW-UP IS NEEDED.     Ct Head Without Contrast    Result Date: 10/21/2018  [facility] RADIOLOGY EXAM: CT HEAD WO CONTRAST LOCATION: [facility] DATE/TIME: 10/21/2018 3:42 PM INDICATION: Unwitnessed fall, dementia unwitnessed fall, dementia COMPARISON: 3/31/2018 TECHNIQUE: Routine without IV contrast. Multiplanar reformats. Dose reduction techniques were used. FINDINGS: INTRACRANIAL CONTENTS: No intracranial hemorrhage, extraaxial collection, or mass effect.  No CT evidence of acute infarct. Moderate presumed chronic small vessel ischemic changes. Mild to moderate generalized volume loss. No hydrocephalus. VISUALIZED ORBITS/SINUSES/MASTOIDS: No significant orbital abnormality. No significant paranasal sinus  mucosal disease. No significant middle ear or mastoid effusion. OSSEOUS STRUCTURES/SOFT TISSUES: No significant abnormality.     CONCLUSION: 1.  No acute intracranial injury. No intracranial hemorrhage, mass, or definite CT evidence of recent ischemia.    Xr Pelvis Ap    Result Date: 10/21/2018  XR PELVIS AP 10/21/2018 2:28 PM INDICATION: Fall, right hip pain COMPARISON: Right femur done today as well FINDINGS: Comminuted intertrochanteric fracture proximal right femur with moderate varus deformity. No other fractures. Postop change proximal left femur..    Xr C Arm Greater Than One Hour    Result Date: 10/22/2018  Please see Operative or Procedure Note for details on this exam or Radiology Report for body part of interest (if applicable).       Assessment/Plan:    Right hip intertrochanteric fracture.  Status post intramedullary nailing.  Discharge back to the TCU continue with his incisional cares  Pain management he is on Tylenol as well as morphine.  We will schedule some Tylenol 4 times a day for him and monitor response is not reporting much pain at rest nursing also advised to ice his incision  DVT prophylaxis he was put on aspirin.  Chronic kidney disease stage V-patient's creatinine in the hospital was around 5 at the past he has indicated several times that he did not want to pursue hemodialysis  ABLA in the setting of chronic anemia secondary to chronic kidney disease discharge hemoglobin was 7.4 no transfusion less than 7 .  he is discharged on oral iron.  DM-2 -last A1c was 5.4 his Glucotrol was discontinued  Cardiomyopathy/congestive heart failure  Hypothyroid  Depression  HLD  Vitamin D deficiency on supplementation  Dementia quite advanced with patient being a high elopement risk in the past he has had several reported episodes.  In light of his progressive decline and advancing renal failure he has been placed on hospice cares.  No labs to be ordered for him.  Monitor pain management.  He is not  eating very well continue to monitor progress.  Bedbound currently no falls reported  Total time spent was 45 minutes, more than half of it was in face-to-face counseling regarding disease state, treatment, side effects, documentation, review of clinical data and coordination of care    Electronically signed by: CARA Carey  This progress note was completed using Dragon software and there may be grammatical errors.

## 2021-06-21 NOTE — ANESTHESIA PREPROCEDURE EVALUATION
Anesthesia Evaluation      Patient summary reviewed   No history of anesthetic complications     Airway   Mallampati: I  Neck ROM: full   Pulmonary - negative ROS and normal exam                          Cardiovascular - normal exam  Exercise tolerance: < 4 METS  (+) hypertension, valvular problems/murmurs, dysrhythmias, cardiomyopathy,     (-) murmur  ECG reviewed  Rhythm: regular  Rate: normal,    no murmur      Neuro/Psych    (+) CVA , depression, dementia,     Endo/Other    (+) diabetes mellitus type 2, hypothyroidism,      GI/Hepatic/Renal    (+)   chronic renal disease,           Dental    (+) poor dentition                       Anesthesia Plan  Planned anesthetic: general endotracheal    ASA 4 - emergent   Induction: intravenous   Anesthetic plan and risks discussed with: patient and spouse            Anesthesia Evaluation      Patient summary reviewed   No history of anesthetic complications     Airway   Mallampati: I  Neck ROM: full   Pulmonary - negative ROS and normal exam                          Cardiovascular - normal exam  Exercise tolerance: < 4 METS  (+) hypertension, valvular problems/murmurs AS, dysrhythmias, cardiomyopathy,     (-) murmur  ECG reviewed  Rhythm: regular  Rate: normal,    no murmur      Neuro/Psych    (+) CVA , depression, dementia,     Endo/Other    (+) diabetes mellitus type 2, hypothyroidism,      GI/Hepatic/Renal    (+)   chronic renal disease CRI,           Dental - normal exam                        Anesthesia Plan  Planned anesthetic: general endotracheal    ASA 3   Induction: intravenous   Anesthetic plan and risks discussed with: patient and spouse  Anesthesia plan special considerations: antiemetics,   Post-op plan: routine recovery  DNR/DNI status   DNR/DNI status discussed with spouse.  Plan is for suspension of DNR      Anesthesia Evaluation      Patient summary reviewed   No history of anesthetic complications     Airway   Mallampati: I  Neck ROM: full   Pulmonary -  negative ROS and normal exam                          Cardiovascular - normal exam  Exercise tolerance: < 4 METS  (+) hypertension, valvular problems/murmurs AS, dysrhythmias, cardiomyopathy,     (-) murmur  ECG reviewed  Rhythm: regular  Rate: normal,    no murmur      Neuro/Psych    (+) CVA , depression, dementia,     Endo/Other    (+) diabetes mellitus type 2, hypothyroidism,      GI/Hepatic/Renal    (+)   chronic renal disease CRI,           Dental - normal exam                        Anesthesia Plan  Planned anesthetic: general endotracheal    ASA 3   Induction: intravenous   Anesthetic plan and risks discussed with: patient and spouse  Anesthesia plan special considerations: antiemetics,   Post-op plan: routine recovery  DNR/DNI status   DNR/DNI status discussed with spouse.  Plan is for suspension of DNR

## 2021-06-21 NOTE — ANESTHESIA CARE TRANSFER NOTE
Last vitals:   Vitals:    10/22/18 2059   BP: 152/69   Pulse: 65   Resp: 12   Temp: 37.5  C (99.5  F)   SpO2: 100%     Patient's level of consciousness is drowsy  Spontaneous respirations: yes  Maintains airway independently: yes  Dentition unchanged: yes  Oropharynx: oropharynx clear of all foreign objects    QCDR Measures:  ASA# 20 - Surgical Safety Checklist: WHO surgical safety checklist completed prior to induction  PQRS# 430 - Adult PONV Prevention: 4558F - Pt received => 2 anti-emetic agents (different classes) preop & intraop  ASA# 8 - Peds PONV Prevention: NA - Not pediatric patient, not GA or 2 or more risk factors NOT present  PQRS# 424 - Hattie-op Temp Management: 4559F - At least one body temp DOCUMENTED => 35.5C or 95.9F within required timeframe  PQRS# 426 - PACU Transfer Protocol: - Transfer of care checklist used  ASA# 14 - Acute Post-op Pain: ASA14B - Patient did NOT experience pain >= 7 out of 10   Pt breathing spontaneously, follows commands, suctioned extubated. Spontaneous respirations with SFM to PACU, VSS

## 2021-06-21 NOTE — PROGRESS NOTES
[facility]      Facility:    [facility] [[id number]]  Code Status: DNR/DNI      Chief Complaint/Reason for Visit:  Chief Complaint   Patient presents with     Problem Visit       HPI:   Scout is a 78 y.o. male who is recent transfer from [facility].  He has a past medical history of congestive heart failure with systolic dysfunction, diabetes, chronic kidney disease stage IV now 5 and hypertension who lives in his own home who was discharged from this TCU in Nov 2017.  He was brought to the ER by police with severe confusion on 2/4/18 and was discharged on 2/7/18 to the TCU.  Per wife he has been noncompliant with medications and has several violent outbursts, though nothing physical that is known. Historically his has ESRD and has refused dialysis.  Per nephrology his baseline creatinine is 3-4, with a level on 4.22 on admission.      On exam today he is somulent and not responding. Hospice is being notified.     Recent hospitalization:  Patient was sent to [facility] per finding him on the floor. Per imaging was found to have L hip fx, initial INR elevated, given FFP per surgery. Also Cr was 6+, stopped lasix and K supplementation. Was admitted on 3/24/18 and discharged on 3/28/18. He now has WBAT on L hip.    Again sent to [facility] on 10/23/18. Patient was admitted and seen by orthopedics.  Ultimately it was determined that he would benefit from operative repair and had an intramedullary nail.  Postoperatively did well and will be discharging back to the care center on hospice.  The decision was made to perform the nail as outcomes are better even for people on hospice.  Postoperatively care will be focused on comfort.  Calhoun catheter will be left in place for now for comfort.  He was returned on 10/23/18.    Patient denies headache, chest pain, numbness or tingling, shortest of breath, eating or swallowing concerns, nausea or vomiting,  diarrhea or bowel abnormalities, or no new integumentary concerns today. Was previously scheduled for DEXA scan.    Past Medical History:  Past Medical History:   Diagnosis Date     Acute embolic stroke (H)      Aortic stenosis 5/4/2015     Aortic stenosis      Benign Essential Hypertension     Created by Conversion      Chronic Kidney Disease, Stage 3     Created by Conversion      Dementia      Depression      Diabetes mellitus, type II (H)      Diabetes With Renal Manifestations     Created by Conversion      Essential Hypercholesterolemia     Created by Conversion      Type 2 Diabetes Mellitus     Created by Conversion      Vertigo     Created by Conversion            Surgical History:  Past Surgical History:   Procedure Laterality Date     CATARACT EXTRACTION       HIP PINNING Left 3/25/2018    Procedure: LEFT HIP OPEN REDUCTION INTERNAL FIXATION;  Surgeon: Gamaliel Reeder MD;  Location: [facility];  Service:      HIP PINNING Right 10/22/2018    Procedure: RIGHT HIP INTRAMEDULLARY NAILING;  Surgeon: Richard Erwin MD;  Location: [facility];  Service:        Family History:   Family History   Problem Relation Age of Onset     Heart disease Mother      Mental illness Father        Social History:    Social History     Social History     Marital status:      Spouse name: N/A     Number of children: N/A     Years of education: N/A     Social History Main Topics     Smoking status: Former Smoker     Quit date: 5/26/1977     Smokeless tobacco: Never Used     Alcohol use No     Drug use: No     Sexual activity: Not on file     Other Topics Concern     Not on file     Social History Narrative          Review of Systems   Constitutional: Positive for activity change. Negative for appetite change, diaphoresis, fatigue and fever.        Weak, somulent   HENT: Negative for congestion, facial swelling and hearing loss.    Eyes: Negative for photophobia, pain and visual disturbance.    Respiratory: Negative for apnea, shortness of breath and wheezing.    Cardiovascular: Negative for chest pain.   Gastrointestinal: Negative for abdominal distention, abdominal pain, blood in stool, constipation and diarrhea.   Endocrine: Negative for polyuria.   Genitourinary: Positive for frequency. Negative for dysuria.        Retention   Musculoskeletal:        No pain, sleeps well   Skin:        intact   Allergic/Immunologic: Negative.    Neurological: Positive for weakness. Negative for dizziness, speech difficulty and light-headedness.   Hematological: Negative.    Psychiatric/Behavioral: Negative for agitation, hallucinations and sleep disturbance. The patient is not nervous/anxious.         Somulent       Vitals:    10/30/18 1223   BP: 105/62   Pulse: (!) 50   Resp: 14   Temp: 97  F (36.1  C)   SpO2: 90%       Physical Exam   Constitutional: He appears well-developed. No distress.   Hospice care   HENT:   Head: Normocephalic and atraumatic.   Mouth/Throat: Oropharynx is clear and moist. No oropharyngeal exudate.   Eyes: Pupils are equal, round, and reactive to light. Right eye exhibits no discharge. Left eye exhibits no discharge. No scleral icterus.   Neck: Normal range of motion. Neck supple. No JVD present.   Cardiovascular: Exam reveals no gallop and no friction rub.    Murmur heard.  IRR, 3/6 CARMEN LSB   Pulmonary/Chest: Effort normal. No stridor. He has no wheezes. He has rales.   bibasilar crackles, RA   Abdominal: Soft. Bowel sounds are normal. He exhibits no distension. There is no tenderness.   No constipation or diarrhea   Genitourinary:   Genitourinary Comments: Deferred   Musculoskeletal: He exhibits edema. He exhibits no tenderness or deformity.   Hospice care   Neurological: He is alert. No cranial nerve deficit.   Alert and oriented ×1, somulent   Skin: Skin is warm and dry. He is not diaphoretic.   somulent   Psychiatric: He has a normal mood and affect.   No anxiety or depression, no  behavioral issues, [facility]   Vitals reviewed.      Medication List:  Current Outpatient Prescriptions   Medication Sig     acetaminophen (TYLENOL) 500 MG tablet Take 500 mg by mouth every 4 (four) hours as needed.      amiodarone (PACERONE) 200 MG tablet Take 1 tablet (200 mg total) by mouth every other day.     aspirin 325 MG tablet Take 1 tablet (325 mg total) by mouth 2 (two) times a day.     carvedilol (COREG) 25 MG tablet Take 25 mg by mouth 2 (two) times a day with meals. Hold if SBP <120 or pulse <60     cholecalciferol, vitamin D3, 1,000 unit tablet Take 3,000 Units by mouth daily.      citalopram (CELEXA) 20 MG tablet Take 20 mg by mouth every morning.     docusate sodium (COLACE) 100 MG capsule Take 1 capsule (100 mg total) by mouth 2 (two) times a day.     ferrous sulfate 325 (65 FE) MG tablet Take 1 tablet by mouth daily with breakfast.     hydrALAZINE (APRESOLINE) 10 MG tablet Take 1 tablet (10 mg total) by mouth 4 (four) times a day.     isosorbide mononitrate (IMDUR) 30 MG 24 hr tablet Take 30 mg by mouth daily.     levothyroxine (SYNTHROID, LEVOTHROID) 100 MCG tablet Take 100 mcg by mouth daily.     magnesium oxide (MAG-OX) 400 mg tablet Take 400 mg by mouth daily.     melatonin 3 mg Tab tablet Take 6 mg by mouth at bedtime.     morphine 2.5 MG solutab Place 2.5 mg under the tongue every hour as needed for pain (for moderate to severe pain or dyspnea).     nystatin (MYCOSTATIN) powder Apply 1 application topically 2 (two) times a day. Apply to groin topically every day and evening     polyethylene glycol (MIRALAX) 17 gram packet Take 1 packet (17 g total) by mouth 2 (two) times a day.     rivastigmine (EXELON) 4.6 mg/24 hr Place 1 patch on the skin daily.     senna-docusate (PERICOLACE) 8.6-50 mg tablet Take 1 tablet by mouth 2 (two) times a day.     tamsulosin (FLOMAX) 0.4 mg Cp24 Take 1 capsule (0.4 mg total) by mouth 2 (two) times a day.     triamcinolone (KENALOG) 0.5 % ointment Apply 1  application topically 2 (two) times a day as needed.        Labs:  Results for orders placed or performed during the hospital encounter of 10/21/18   Basic Metabolic Panel   Result Value Ref Range    Sodium 136 136 - 145 mmol/L    Potassium 5.1 (H) 3.5 - 5.0 mmol/L    Chloride 106 98 - 107 mmol/L    CO2 22 22 - 31 mmol/L    Anion Gap, Calculation 8 5 - 18 mmol/L    Glucose 183 (H) 70 - 125 mg/dL    Calcium 8.5 8.5 - 10.5 mg/dL    BUN 60 (H) 8 - 28 mg/dL    Creatinine 5.03 (H) 0.70 - 1.30 mg/dL    GFR MDRD Af Amer 14 (L) >60 mL/min/1.73m2    GFR MDRD Non Af Amer 11 (L) >60 mL/min/1.73m2     Lab Results   Component Value Date    WBC 12.3 (H) 10/23/2018    HGB 7.4 (L) 10/23/2018    HCT 22.5 (L) 10/23/2018    MCV 98 10/23/2018     10/23/2018     Vitamin D, Total (25-Hydroxy)   Date Value Ref Range Status   09/27/2018 41.3 30.0 - 80.0 ng/mL Final     Lab Results   Component Value Date    TSH 0.87 04/23/2018     Lab Results   Component Value Date    INR 1.19 (H) 10/21/2018    INR 3.37 (H) 03/31/2018    INR 2.42 (H) 03/28/2018       Assessment/Plan:  1.  DM: not monitored  2.  HTN: medications discontinued  3.  CHF: recently lasix was d/c'd, weight decreasing  4.  Afib: taken off anticoagulation per hospice  5.  ESRD: last Cr 5.19 with GFR 11, no HD.  6.  Insomnia: hospice  7.  Hypomagnesia: discontinued, hospice  8.  Hypokalemia: recently d/c'd lasix, recheck K 3.6 on 7/20/18.  9.  Agitation: stable, none recorded, monitor.  10: Elopement: profound weakness   11. Dementia: Exelon patch, no change.  12. Hypothyroidism: last TSH 0.87 on 4/23/18.  13. ABLA: last Hgb 7.3, hospice  14. Pain management: solutab 2.5mg QID  15. R hip fx: f/u with ortho as directed, ASA 325mg BID  16. Hospice: on going,     The care plan has been reviewed and all orders signed. Changes to care plan, if any, as noted. Otherwise, continue care plan of care.  The total time spent with this patient was greater than 50 minutes, with greater than  50% spent in counseling and coordination of care that included multiple issues per hospice care.      Electronically signed by: Reji Dougherty NP

## 2021-06-21 NOTE — ANESTHESIA POSTPROCEDURE EVALUATION
Patient: Scout Batista  RIGHT HIP INTRAMEDULLARY NAILING  Anesthesia type: general    Patient location: PACU  Last vitals:   Vitals:    10/22/18 2130   BP: 154/70   Pulse: 66   Resp: 14   Temp: 37.3  C (99.2  F)   SpO2: 100%     Post vital signs: stable  Level of consciousness: awake and responds to simple questions  Post-anesthesia pain: pain controlled  Post-anesthesia nausea and vomiting: no  Pulmonary: unassisted, return to baseline  Cardiovascular: stable and blood pressure at baseline  Hydration: adequate  Anesthetic events: no    QCDR Measures:  ASA# 11 - Hattie-op Cardiac Arrest: ASA11B - Patient did NOT experience unanticipated cardiac arrest  ASA# 12 - Hattie-op Mortality Rate: ASA12B - Patient did NOT die  ASA# 13 - PACU Re-Intubation Rate: ASA13B - Patient did NOT require a new airway mgmt  ASA# 10 - Composite Anes Safety: ASA10A - No serious adverse event    Additional Notes:

## 2021-07-03 NOTE — ADDENDUM NOTE
Addendum Note by Rupinder Thomas MD at 4/2/2017  1:46 PM     Author: Rupinder Thomas MD Service: -- Author Type: Physician    Filed: 4/2/2017  1:46 PM Encounter Date: 4/2/2017 Status: Signed    : Rupinder Thomas MD (Physician)    Addended by: RUPINDER THOMAS on: 4/2/2017 01:46 PM        Modules accepted: Orders